# Patient Record
Sex: MALE | Race: OTHER | HISPANIC OR LATINO | URBAN - METROPOLITAN AREA
[De-identification: names, ages, dates, MRNs, and addresses within clinical notes are randomized per-mention and may not be internally consistent; named-entity substitution may affect disease eponyms.]

---

## 2018-07-31 ENCOUNTER — EMERGENCY (EMERGENCY)
Facility: HOSPITAL | Age: 43
LOS: 1 days | Discharge: HOME | End: 2018-07-31
Attending: EMERGENCY MEDICINE
Payer: SELF-PAY

## 2018-07-31 VITALS
DIASTOLIC BLOOD PRESSURE: 79 MMHG | HEART RATE: 98 BPM | SYSTOLIC BLOOD PRESSURE: 116 MMHG | OXYGEN SATURATION: 99 % | RESPIRATION RATE: 20 BRPM | TEMPERATURE: 99 F

## 2018-07-31 VITALS — HEIGHT: 60 IN | WEIGHT: 145.06 LBS

## 2018-07-31 PROCEDURE — 99283 EMERGENCY DEPT VISIT LOW MDM: CPT

## 2018-07-31 PROCEDURE — 99283 EMERGENCY DEPT VISIT LOW MDM: CPT | Mod: 25

## 2018-07-31 PROCEDURE — 73030 X-RAY EXAM OF SHOULDER: CPT

## 2018-07-31 PROCEDURE — 73030 X-RAY EXAM OF SHOULDER: CPT | Mod: 26,LT

## 2018-07-31 PROCEDURE — 99053 MED SERV 10PM-8AM 24 HR FAC: CPT

## 2018-07-31 RX ORDER — IBUPROFEN 200 MG
1 TABLET ORAL
Qty: 20 | Refills: 0 | OUTPATIENT
Start: 2018-07-31 | End: 2018-08-04

## 2018-07-31 RX ORDER — IBUPROFEN 200 MG
600 TABLET ORAL ONCE
Qty: 0 | Refills: 0 | Status: COMPLETED | OUTPATIENT
Start: 2018-07-31 | End: 2018-07-31

## 2018-07-31 RX ADMIN — Medication 600 MILLIGRAM(S): at 03:44

## 2018-07-31 NOTE — ED PROVIDER NOTE - UPPER EXTREMITY EXAM, LEFT
+ TTP of superior posterior shoulder, no obvious deformities, no ligamentous laxity, DROM 2nd to pain, elbow and wrist non tender/ FROM/LIMITED ROM/TENDERNESS

## 2018-07-31 NOTE — ED PROCEDURE NOTE - CPROC ED POST PROC CARE GUIDE1
Keep the cast/splint/dressing clean and dry./Verbal/written post procedure instructions were given to patient/caregiver./Instructed patient/caregiver regarding signs and symptoms of infection./Instructed patient/caregiver to follow-up with primary care physician./Elevate the injured extremity as instructed.

## 2018-07-31 NOTE — ED PROVIDER NOTE - MUSCULOSKELETAL MINIMAL EXAM
normal range of motion/no muscle tenderness/neck supple/motor intact/atraumatic/no midline vertebral tenderness

## 2018-07-31 NOTE — ED PROVIDER NOTE - PROGRESS NOTE DETAILS
Acute Ed read of Xray shows no acute fractures/ dislocation. PT aware if secondary reading of imaging changes they will be notified. Will cover abrasion with bacitracin, place shoulder in sling for comfort. referral to [facility] given to PT. PT verbalized understanding of diagnosis and importance of follow up at [facility]. PT educated on importance of follow up and when to return to the ED.

## 2018-07-31 NOTE — ED PROVIDER NOTE - SKIN, MLM
Skin normal color for race, warm, dry and intact. No evidence of rash. 2 cm superficial abrasion of posterior L shoulder

## 2018-07-31 NOTE — ED PROVIDER NOTE - OBJECTIVE STATEMENT
43 yo M Pt, no pertinent PmHx, presents to ED complaining of L shoulder pain s/p fall x 40 min. PT states he fell from standing height onto his L shoulder. Pt admits to associated L shoulder pain, LROM 2nd to pain. PT denies numbness, tingling, fever, chills, syncope, head trauma, LOC, and any other acute symptoms at this time.

## 2018-07-31 NOTE — ED PROVIDER NOTE - ATTENDING CONTRIBUTION TO CARE
43 yo M Pt, no pertinent PmHx, presents to ED complaining of L shoulder pain s/p fall x 40 min. PT states he fell from standing height onto his L shoulder. Pt admits to associated L shoulder pain, LROM 2nd to pain. PT denies numbness, tingling, fever, chills, syncope, head trauma, LOC, and any other acute symptoms at this time.  pe left shoulder  pain with rom; tender to palpation; sensation intact;  dx shoulder dislocation; xray , reduction and shoulder immobilizer

## 2018-08-06 ENCOUNTER — EMERGENCY (EMERGENCY)
Facility: HOSPITAL | Age: 43
LOS: 1 days | Discharge: HOME | End: 2018-08-06
Attending: EMERGENCY MEDICINE | Admitting: EMERGENCY MEDICINE
Payer: SELF-PAY

## 2018-08-06 VITALS
OXYGEN SATURATION: 97 % | TEMPERATURE: 98 F | HEART RATE: 84 BPM | RESPIRATION RATE: 18 BRPM | SYSTOLIC BLOOD PRESSURE: 141 MMHG | DIASTOLIC BLOOD PRESSURE: 97 MMHG

## 2018-08-06 VITALS — HEIGHT: 62 IN | WEIGHT: 164.91 LBS

## 2018-08-06 PROCEDURE — T1013: CPT

## 2018-08-06 PROCEDURE — 96372 THER/PROPH/DIAG INJ SC/IM: CPT

## 2018-08-06 PROCEDURE — 99283 EMERGENCY DEPT VISIT LOW MDM: CPT | Mod: 25

## 2018-08-06 PROCEDURE — 99283 EMERGENCY DEPT VISIT LOW MDM: CPT

## 2018-08-06 RX ORDER — KETOROLAC TROMETHAMINE 30 MG/ML
60 SYRINGE (ML) INJECTION ONCE
Qty: 0 | Refills: 0 | Status: DISCONTINUED | OUTPATIENT
Start: 2018-08-06 | End: 2018-08-06

## 2018-08-06 RX ORDER — IBUPROFEN 200 MG
1 TABLET ORAL
Qty: 20 | Refills: 0 | OUTPATIENT
Start: 2018-08-06 | End: 2018-08-10

## 2018-08-06 RX ADMIN — Medication 60 MILLIGRAM(S): at 17:47

## 2018-08-06 RX ADMIN — Medication 60 MILLIGRAM(S): at 17:10

## 2018-08-06 NOTE — ED ADULT TRIAGE NOTE - CHIEF COMPLAINT QUOTE
Pt axox3, Lithuanian speaking c/o  left shoulder pain. PT states he was seen and dc'd from [facility] x 1 week and told all test were negative

## 2018-08-06 NOTE — ED ADULT NURSE NOTE - CHIEF COMPLAINT QUOTE
Pt axox3, Haitian speaking c/o  left shoulder pain. PT states he was seen and dc'd from [facility] x 1 week and told all test were negative

## 2018-08-06 NOTE — ED PROVIDER NOTE - OBJECTIVE STATEMENT
Pt is a 42y M with no PMH complaining of L shoulder pain s/p fall last Tuesday. He states he was here the day of the injury and had an xray  which was normal. He still reports pain. He did not follow up with an orthopedist. HE is here because he wants another xray. I explained to pt that there is no need for another xray since there was no new trauma. He has been taking Ibuprofen. NKDA. No other complaints.
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
## 2018-08-06 NOTE — ED PROVIDER NOTE - CHPI ED SYMPTOMS NEG
no deformity/no tingling/no numbness/no difficulty bearing weight/no back pain/no bruising/no abrasion/no fever

## 2018-08-06 NOTE — ED PROVIDER NOTE - ATTENDING CONTRIBUTION TO CARE
I personally saw the patient with the PA, and completed the key components of the history and physical exam. I then discussed the management plan with the PA.    Pt here for evaluation of persistent L shoulder pain s/p recent fall.   Pt has not f/u with anyone. 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  No numbness/tingling/ neck pain/ other compalints.  Pt has good ROm of L shoulder with no gross deformities.  Moderate anterior yellowish bruising, but no point tenderness.  Some clicking noted when ranges his shoulder.  2+ radial pulse.  Pt advised to f/u with orthopedist and to take pain medication as needed. No indication for repeat imaging.

## 2018-08-06 NOTE — ED ADULT NURSE NOTE - NSIMPLEMENTINTERV_GEN_ALL_ED
Implemented All Universal Safety Interventions:  [place] to call system. Call bell, personal items and telephone within reach. Instruct patient to call for assistance. Room bathroom lighting operational. Non-slip footwear when patient is off stretcher. Physically safe environment: no spills, clutter or unnecessary equipment. Stretcher in lowest position, wheels locked, appropriate side rails in place.

## 2018-10-01 ENCOUNTER — EMERGENCY (EMERGENCY)
Facility: HOSPITAL | Age: 43
LOS: 1 days | Discharge: HOME | End: 2018-10-01
Attending: EMERGENCY MEDICINE
Payer: SELF-PAY

## 2018-10-01 VITALS
WEIGHT: 149.91 LBS | TEMPERATURE: 99 F | HEIGHT: 64 IN | RESPIRATION RATE: 18 BRPM | DIASTOLIC BLOOD PRESSURE: 80 MMHG | SYSTOLIC BLOOD PRESSURE: 140 MMHG | HEART RATE: 94 BPM | OXYGEN SATURATION: 99 %

## 2018-10-01 PROCEDURE — 99053 MED SERV 10PM-8AM 24 HR FAC: CPT

## 2018-10-01 PROCEDURE — 99284 EMERGENCY DEPT VISIT MOD MDM: CPT | Mod: 25
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
## 2018-10-01 NOTE — ED ADULT TRIAGE NOTE - CHIEF COMPLAINT QUOTE
Admits to drinking beer today. Alert and oriented X 3. No trauma. Undressed, placed in yellow gown, and belongings secured by SNA

## 2018-10-02 VITALS
SYSTOLIC BLOOD PRESSURE: 120 MMHG | DIASTOLIC BLOOD PRESSURE: 78 MMHG | RESPIRATION RATE: 18 BRPM | OXYGEN SATURATION: 98 % | HEART RATE: 80 BPM | TEMPERATURE: 98 F

## 2018-10-02 LAB
ETHANOL SERPL-MCNC: 145 MG/DL — SIGNIFICANT CHANGE UP
ETHANOL SERPL-MCNC: 361 MG/DL — SIGNIFICANT CHANGE UP

## 2018-10-02 PROCEDURE — 36415 COLL VENOUS BLD VENIPUNCTURE: CPT

## 2018-10-02 PROCEDURE — 99285 EMERGENCY DEPT VISIT HI MDM: CPT

## 2018-10-02 PROCEDURE — 80307 DRUG TEST PRSMV CHEM ANLYZR: CPT

## 2018-10-02 RX ORDER — IBUPROFEN 200 MG
600 TABLET ORAL ONCE
Qty: 0 | Refills: 0 | Status: COMPLETED | OUTPATIENT
Start: 2018-10-02 | End: 2018-10-02

## 2018-10-02 RX ADMIN — Medication 600 MILLIGRAM(S): at 09:50

## 2018-10-02 RX ADMIN — Medication 600 MILLIGRAM(S): at 08:50

## 2018-10-02 NOTE — ED ADULT NURSE REASSESSMENT NOTE - NS ED NURSE REASSESS COMMENT FT1
pt in no apparent distress, c/o 4/10 headache MD Thomas made aware, awaiting further orders, pt has no IV assess, plan of care explained to pt, pt verbalized understanding.

## 2018-10-02 NOTE — ED ADULT NURSE REASSESSMENT NOTE - NS ED NURSE REASSESS COMMENT FT1
Pt sleeping comfortably on stretcher, easy arousability, offers no complaints, pt safety maintained, both side rails raised, bed in low locked position.

## 2018-10-02 NOTE — ED ADULT NURSE REASSESSMENT NOTE - NS ED NURSE REASSESS COMMENT FT1
Pt returned from restroom, walking with steady gait, assisted back to stretcher, provided with a meal at this time as requested tolerating well, pt safety maintained.

## 2018-10-02 NOTE — ED ADULT NURSE NOTE - NSIMPLEMENTINTERV_GEN_ALL_ED
Implemented All Fall Risk Interventions:  [place] to call system. Call bell, personal items and telephone within reach. Instruct patient to call for assistance. Room bathroom lighting operational. Non-slip footwear when patient is off stretcher. Physically safe environment: no spills, clutter or unnecessary equipment. Stretcher in lowest position, wheels locked, appropriate side rails in place. Provide visual cue, wrist band, yellow gown, etc. Monitor gait and stability. Monitor for mental status changes and reorient to person, place, and time. Review medications for side effects contributing to fall risk. Reinforce activity limits and safety measures with patient and family.

## 2019-07-18 NOTE — ED ADULT NURSE NOTE - NS ED NURSE DC INFO COMPLEXITY
Did not want to review medical history kept say it is in the chart   Simple: Patient demonstrates quick and easy understanding

## 2020-03-15 ENCOUNTER — EMERGENCY (EMERGENCY)
Facility: HOSPITAL | Age: 45
LOS: 1 days | Discharge: HOME | End: 2020-03-15
Attending: STUDENT IN AN ORGANIZED HEALTH CARE EDUCATION/TRAINING PROGRAM
Payer: MEDICAID

## 2020-03-15 VITALS
SYSTOLIC BLOOD PRESSURE: 133 MMHG | RESPIRATION RATE: 20 BRPM | HEIGHT: 65 IN | OXYGEN SATURATION: 98 % | WEIGHT: 169.98 LBS | TEMPERATURE: 98 F | DIASTOLIC BLOOD PRESSURE: 89 MMHG | HEART RATE: 95 BPM

## 2020-03-15 PROCEDURE — 99284 EMERGENCY DEPT VISIT MOD MDM: CPT

## 2020-03-15 PROCEDURE — 71101 X-RAY EXAM UNILAT RIBS/CHEST: CPT | Mod: 26

## 2020-03-15 PROCEDURE — 93010 ELECTROCARDIOGRAM REPORT: CPT

## 2020-03-15 RX ORDER — KETOROLAC TROMETHAMINE 30 MG/ML
60 SYRINGE (ML) INJECTION ONCE
Refills: 0 | Status: DISCONTINUED | OUTPATIENT
Start: 2020-03-15 | End: 2020-03-15

## 2020-03-15 RX ADMIN — Medication 60 MILLIGRAM(S): at 16:05

## 2020-03-15 NOTE — ED PROVIDER NOTE - PATIENT PORTAL LINK FT
You can access the FollowMyHealth Patient Portal offered by [facility] by registering at the following website: [email or web address]. By joining [facility]’s FollowMyHealth portal, you will also be able to view your health information using other applications (apps) compatible with our system.

## 2020-03-15 NOTE — ED PROVIDER NOTE - CARE PLAN
Principal Discharge DX:	Contusion of thoracic wall, unspecified area of thoracic wall, initial encounter
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
## 2020-03-15 NOTE — ED PROVIDER NOTE - NS ED ROS FT
No fever/chills, No photophobia/eye pain/changes in vision, No ear pain/sore throat/dysphagia, + chest pain no palpitations, no SOB/cough/wheeze/stridor, No abdominal pain, No N/V/D, no dysuria/frequency/discharge, No neck pain +back pain, no rash, no changes in neurological status/function.

## 2020-03-15 NOTE — ED PROVIDER NOTE - NSFOLLOWUPINSTRUCTIONS_ED_ALL_ED_FT
rib contusion - rest, ice, motrin for pain - return to ED if you experience worsening symptoms such as difficulty breathing or worsening pain, otherwise follow up with your primary doctor within 2 days
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
## 2020-04-15 PROCEDURE — 93005 ELECTROCARDIOGRAM TRACING: CPT

## 2020-04-15 PROCEDURE — 71101 X-RAY EXAM UNILAT RIBS/CHEST: CPT

## 2020-04-15 PROCEDURE — 99283 EMERGENCY DEPT VISIT LOW MDM: CPT | Mod: 25

## 2020-04-15 PROCEDURE — 96372 THER/PROPH/DIAG INJ SC/IM: CPT

## 2020-11-13 NOTE — ED PROVIDER NOTE - GASTROINTESTINAL, MLM
Resolved issues regarding transfusion orders at [facility].   Abdomen soft, non-tender, no guarding.

## 2021-04-22 ENCOUNTER — INPATIENT (INPATIENT)
Facility: HOSPITAL | Age: 46
LOS: 1 days | Discharge: HOME | DRG: 563 | End: 2021-04-24
Attending: SURGERY | Admitting: SURGERY
Payer: MEDICAID

## 2021-04-22 VITALS
RESPIRATION RATE: 18 BRPM | SYSTOLIC BLOOD PRESSURE: 129 MMHG | DIASTOLIC BLOOD PRESSURE: 81 MMHG | HEIGHT: 65 IN | TEMPERATURE: 98 F | HEART RATE: 86 BPM | WEIGHT: 154.98 LBS | OXYGEN SATURATION: 100 %

## 2021-04-22 DIAGNOSIS — S42.101A FRACTURE OF UNSPECIFIED PART OF SCAPULA, RIGHT SHOULDER, INITIAL ENCOUNTER FOR CLOSED FRACTURE: ICD-10-CM

## 2021-04-22 LAB
ALBUMIN SERPL ELPH-MCNC: 4.7 G/DL — SIGNIFICANT CHANGE UP (ref 3.3–5.2)
ALP SERPL-CCNC: 176 U/L — HIGH (ref 40–120)
ALT FLD-CCNC: 66 U/L — HIGH
ANION GAP SERPL CALC-SCNC: 17 MMOL/L — SIGNIFICANT CHANGE UP (ref 5–17)
APTT BLD: 31.3 SEC — SIGNIFICANT CHANGE UP (ref 27.5–35.5)
AST SERPL-CCNC: 100 U/L — HIGH
BASOPHILS # BLD AUTO: 0.05 K/UL — SIGNIFICANT CHANGE UP (ref 0–0.2)
BASOPHILS NFR BLD AUTO: 0.4 % — SIGNIFICANT CHANGE UP (ref 0–2)
BILIRUB SERPL-MCNC: 0.5 MG/DL — SIGNIFICANT CHANGE UP (ref 0.4–2)
BLD GP AB SCN SERPL QL: SIGNIFICANT CHANGE UP
BUN SERPL-MCNC: 8 MG/DL — SIGNIFICANT CHANGE UP (ref 8–20)
CALCIUM SERPL-MCNC: 8.8 MG/DL — SIGNIFICANT CHANGE UP (ref 8.6–10.2)
CHLORIDE SERPL-SCNC: 96 MMOL/L — LOW (ref 98–107)
CK MB CFR SERPL CALC: 7 NG/ML — HIGH (ref 0–6.7)
CK SERPL-CCNC: 631 U/L — HIGH (ref 30–200)
CO2 SERPL-SCNC: 24 MMOL/L — SIGNIFICANT CHANGE UP (ref 22–29)
CREAT SERPL-MCNC: 0.48 MG/DL — LOW (ref 0.5–1.3)
EOSINOPHIL # BLD AUTO: 0 K/UL — SIGNIFICANT CHANGE UP (ref 0–0.5)
EOSINOPHIL NFR BLD AUTO: 0 % — SIGNIFICANT CHANGE UP (ref 0–6)
ETHANOL SERPL-MCNC: 178 MG/DL — HIGH (ref 0–9)
GLUCOSE SERPL-MCNC: 107 MG/DL — HIGH (ref 70–99)
HCT VFR BLD CALC: 40.1 % — SIGNIFICANT CHANGE UP (ref 39–50)
HGB BLD-MCNC: 13.9 G/DL — SIGNIFICANT CHANGE UP (ref 13–17)
IMM GRANULOCYTES NFR BLD AUTO: 0.3 % — SIGNIFICANT CHANGE UP (ref 0–1.5)
INR BLD: 1.1 RATIO — SIGNIFICANT CHANGE UP (ref 0.88–1.16)
LYMPHOCYTES # BLD AUTO: 0.94 K/UL — LOW (ref 1–3.3)
LYMPHOCYTES # BLD AUTO: 7.8 % — LOW (ref 13–44)
MCHC RBC-ENTMCNC: 34.5 PG — HIGH (ref 27–34)
MCHC RBC-ENTMCNC: 34.7 GM/DL — SIGNIFICANT CHANGE UP (ref 32–36)
MCV RBC AUTO: 99.5 FL — SIGNIFICANT CHANGE UP (ref 80–100)
MONOCYTES # BLD AUTO: 0.49 K/UL — SIGNIFICANT CHANGE UP (ref 0–0.9)
MONOCYTES NFR BLD AUTO: 4.1 % — SIGNIFICANT CHANGE UP (ref 2–14)
NEUTROPHILS # BLD AUTO: 10.5 K/UL — HIGH (ref 1.8–7.4)
NEUTROPHILS NFR BLD AUTO: 87.4 % — HIGH (ref 43–77)
PLATELET # BLD AUTO: 209 K/UL — SIGNIFICANT CHANGE UP (ref 150–400)
POTASSIUM SERPL-MCNC: 4 MMOL/L — SIGNIFICANT CHANGE UP (ref 3.5–5.3)
POTASSIUM SERPL-SCNC: 4 MMOL/L — SIGNIFICANT CHANGE UP (ref 3.5–5.3)
PROT SERPL-MCNC: 8.2 G/DL — SIGNIFICANT CHANGE UP (ref 6.6–8.7)
PROTHROM AB SERPL-ACNC: 12.7 SEC — SIGNIFICANT CHANGE UP (ref 10.6–13.6)
RAPID RVP RESULT: SIGNIFICANT CHANGE UP
RBC # BLD: 4.03 M/UL — LOW (ref 4.2–5.8)
RBC # FLD: 12 % — SIGNIFICANT CHANGE UP (ref 10.3–14.5)
SARS-COV-2 RNA SPEC QL NAA+PROBE: SIGNIFICANT CHANGE UP
SODIUM SERPL-SCNC: 137 MMOL/L — SIGNIFICANT CHANGE UP (ref 135–145)
T3 SERPL-MCNC: 85 NG/DL — SIGNIFICANT CHANGE UP (ref 80–200)
T4 AB SER-ACNC: 4.1 UG/DL — LOW (ref 4.5–12)
TROPONIN T SERPL-MCNC: <0.01 NG/ML — SIGNIFICANT CHANGE UP (ref 0–0.06)
TSH SERPL-MCNC: 0.14 UIU/ML — LOW (ref 0.27–4.2)
WBC # BLD: 12.02 K/UL — HIGH (ref 3.8–10.5)
WBC # FLD AUTO: 12.02 K/UL — HIGH (ref 3.8–10.5)

## 2021-04-22 PROCEDURE — 70450 CT HEAD/BRAIN W/O DYE: CPT | Mod: 26

## 2021-04-22 PROCEDURE — 72125 CT NECK SPINE W/O DYE: CPT | Mod: 26

## 2021-04-22 PROCEDURE — 99221 1ST HOSP IP/OBS SF/LOW 40: CPT | Mod: 57

## 2021-04-22 PROCEDURE — 99285 EMERGENCY DEPT VISIT HI MDM: CPT

## 2021-04-22 PROCEDURE — 70486 CT MAXILLOFACIAL W/O DYE: CPT | Mod: 26,MD

## 2021-04-22 PROCEDURE — 71046 X-RAY EXAM CHEST 2 VIEWS: CPT | Mod: 26

## 2021-04-22 PROCEDURE — 72170 X-RAY EXAM OF PELVIS: CPT | Mod: 26

## 2021-04-22 PROCEDURE — 73200 CT UPPER EXTREMITY W/O DYE: CPT | Mod: 26,RT,MG

## 2021-04-22 PROCEDURE — 71250 CT THORAX DX C-: CPT | Mod: 26,MD

## 2021-04-22 PROCEDURE — 99283 EMERGENCY DEPT VISIT LOW MDM: CPT

## 2021-04-22 PROCEDURE — 23570 CLTX SCAPULAR FX W/O MNPJ: CPT

## 2021-04-22 PROCEDURE — 93010 ELECTROCARDIOGRAM REPORT: CPT

## 2021-04-22 PROCEDURE — G1004: CPT

## 2021-04-22 PROCEDURE — 73010 X-RAY EXAM OF SHOULDER BLADE: CPT | Mod: 26

## 2021-04-22 PROCEDURE — 73030 X-RAY EXAM OF SHOULDER: CPT | Mod: 26,RT

## 2021-04-22 RX ORDER — GABAPENTIN 400 MG/1
300 CAPSULE ORAL THREE TIMES A DAY
Refills: 0 | Status: DISCONTINUED | OUTPATIENT
Start: 2021-04-22 | End: 2021-04-24

## 2021-04-22 RX ORDER — SENNA PLUS 8.6 MG/1
2 TABLET ORAL AT BEDTIME
Refills: 0 | Status: DISCONTINUED | OUTPATIENT
Start: 2021-04-22 | End: 2021-04-24

## 2021-04-22 RX ORDER — THIAMINE MONONITRATE (VIT B1) 100 MG
500 TABLET ORAL ONCE
Refills: 0 | Status: COMPLETED | OUTPATIENT
Start: 2021-04-22 | End: 2021-04-22

## 2021-04-22 RX ORDER — SODIUM CHLORIDE 9 MG/ML
1000 INJECTION, SOLUTION INTRAVENOUS ONCE
Refills: 0 | Status: COMPLETED | OUTPATIENT
Start: 2021-04-22 | End: 2021-04-22

## 2021-04-22 RX ORDER — ENOXAPARIN SODIUM 100 MG/ML
30 INJECTION SUBCUTANEOUS EVERY 12 HOURS
Refills: 0 | Status: DISCONTINUED | OUTPATIENT
Start: 2021-04-22 | End: 2021-04-24

## 2021-04-22 RX ORDER — ACETAMINOPHEN 500 MG
650 TABLET ORAL ONCE
Refills: 0 | Status: COMPLETED | OUTPATIENT
Start: 2021-04-22 | End: 2021-04-22

## 2021-04-22 RX ORDER — LIDOCAINE 4 G/100G
1 CREAM TOPICAL EVERY 24 HOURS
Refills: 0 | Status: DISCONTINUED | OUTPATIENT
Start: 2021-04-22 | End: 2021-04-24

## 2021-04-22 RX ORDER — ACETAMINOPHEN 500 MG
650 TABLET ORAL EVERY 6 HOURS
Refills: 0 | Status: DISCONTINUED | OUTPATIENT
Start: 2021-04-22 | End: 2021-04-24

## 2021-04-22 RX ORDER — KETOROLAC TROMETHAMINE 30 MG/ML
10 SYRINGE (ML) INJECTION EVERY 6 HOURS
Refills: 0 | Status: DISCONTINUED | OUTPATIENT
Start: 2021-04-22 | End: 2021-04-23

## 2021-04-22 RX ORDER — FOLIC ACID 0.8 MG
1 TABLET ORAL DAILY
Refills: 0 | Status: DISCONTINUED | OUTPATIENT
Start: 2021-04-22 | End: 2021-04-24

## 2021-04-22 RX ORDER — TETANUS TOXOID, REDUCED DIPHTHERIA TOXOID AND ACELLULAR PERTUSSIS VACCINE, ADSORBED 5; 2.5; 8; 8; 2.5 [IU]/.5ML; [IU]/.5ML; UG/.5ML; UG/.5ML; UG/.5ML
0.5 SUSPENSION INTRAMUSCULAR ONCE
Refills: 0 | Status: COMPLETED | OUTPATIENT
Start: 2021-04-22 | End: 2021-04-22

## 2021-04-22 RX ORDER — MORPHINE SULFATE 50 MG/1
4 CAPSULE, EXTENDED RELEASE ORAL ONCE
Refills: 0 | Status: DISCONTINUED | OUTPATIENT
Start: 2021-04-22 | End: 2021-04-22

## 2021-04-22 RX ADMIN — SENNA PLUS 2 TABLET(S): 8.6 TABLET ORAL at 21:25

## 2021-04-22 RX ADMIN — Medication 10 MILLIGRAM(S): at 23:07

## 2021-04-22 RX ADMIN — LIDOCAINE 1 PATCH: 4 CREAM TOPICAL at 21:22

## 2021-04-22 RX ADMIN — Medication 1 MILLIGRAM(S): at 21:21

## 2021-04-22 RX ADMIN — GABAPENTIN 300 MILLIGRAM(S): 400 CAPSULE ORAL at 21:22

## 2021-04-22 RX ADMIN — MORPHINE SULFATE 4 MILLIGRAM(S): 50 CAPSULE, EXTENDED RELEASE ORAL at 18:00

## 2021-04-22 RX ADMIN — TETANUS TOXOID, REDUCED DIPHTHERIA TOXOID AND ACELLULAR PERTUSSIS VACCINE, ADSORBED 0.5 MILLILITER(S): 5; 2.5; 8; 8; 2.5 SUSPENSION INTRAMUSCULAR at 18:03

## 2021-04-22 RX ADMIN — Medication 105 MILLIGRAM(S): at 21:21

## 2021-04-22 RX ADMIN — ENOXAPARIN SODIUM 30 MILLIGRAM(S): 100 INJECTION SUBCUTANEOUS at 21:26

## 2021-04-22 RX ADMIN — SODIUM CHLORIDE 1000 MILLILITER(S): 9 INJECTION, SOLUTION INTRAVENOUS at 19:03

## 2021-04-22 RX ADMIN — Medication 650 MILLIGRAM(S): at 13:24

## 2021-04-22 RX ADMIN — Medication 650 MILLIGRAM(S): at 14:04

## 2021-04-22 RX ADMIN — Medication 650 MILLIGRAM(S): at 21:21

## 2021-04-22 RX ADMIN — Medication 650 MILLIGRAM(S): at 22:22
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
## 2021-04-22 NOTE — ED PROCEDURE NOTE - PROCEDURE ADDITIONAL DETAILS
instructed pt to follow up with orthopaedic surgery in one to two weeks with Dr. Oliveira as per ortho service instructions
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
## 2021-04-22 NOTE — ED PROVIDER NOTE - CARE PLAN
Principal Discharge DX:	Closed fracture of right scapula, unspecified part of scapula, initial encounter  Secondary Diagnosis:	Pneumothorax, unspecified type
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
## 2021-04-22 NOTE — ED PROVIDER NOTE - NS ED ROS FT
Constitutional: no fever, no sweats, and no chills.  Eyes: no pain, no redness, and no visual changes.  ENMT: no ear pain and no hearing problems, no nasal congestion/drainage, no dysphagia, and no throat pain, no neck pain, no stiffness  CV: no chest pain, no edema.  Resp: no cough, no dyspnea  GI: no abdominal pain, no bloating, no constipation, no diarrhea, no nausea and no vomiting.  : no dysuria, no hematuria  MSK: +msk pain, no weakness  Skin: no lesions, and no rashes.  Neuro: no LOC, no headache, no sensory deficits, and no weakness.

## 2021-04-22 NOTE — CONSULT NOTE ADULT - ATTENDING COMMENTS
Orthopaedic Trauma Surgeon Addendum:    I have personally performed a face-to-face diagnostic evaluation on this patient.  I have reviewed the physician assistant note and agree with the history, exam, and plan of care, except as noted.    Fracture reviewed on CT. Does not involve articular surface.  Sling for comfort, ROM as tolerated. likely f/u prn due to health issues and COVID 19 pandemic    David Fishman MD  Orthopaedic Trauma Surgeon  [facility] [place]

## 2021-04-22 NOTE — ED ADULT NURSE REASSESSMENT NOTE - NS ED NURSE REASSESS COMMENT FT1
received pt from previous nurse. Pt is resting in bed comfortably at this time, no apparent distress noted at this time. pt safety maintained. Pt denies any complaints at this time. pt educated on plan of care, plan of care taught back to RN. plan of care education deemed proficient through successful teach back. will continue to reeducate pt regarding plan of care.

## 2021-04-22 NOTE — ED PROVIDER NOTE - PHYSICAL EXAMINATION
General: well appearing, NAD  Head:  NC, AT  Eyes: EOMI, PERRLA, no scleral icterus  Ears: no erythema/drainage  Nose: midline, no bleeding/drainage  Throat: MMM  Cardiac: RRR, no m/r/g, no lower extremity edema  Respiratory: CTABL, no wheezes/rales/rhonchi, equal chest wall expansions, no use of accessory muscles, no retractions  Abdomen: soft, nondistended, nontender, no rebound tenderness, no guarding, nonperitonitic  MSK/Vascular: full ROM, soft compartments, warm extremities, significant R. back tenderness overlying the scapula, no deltoid tenderness bilaterally  Neuro: Alert and oriented x3, motor/sensory intact  Skin: multiple small 1 to 2 mm skin abrasions over upper extremities  Psych: calm, cooperative, normal affect

## 2021-04-23 DIAGNOSIS — S42.101A FRACTURE OF UNSPECIFIED PART OF SCAPULA, RIGHT SHOULDER, INITIAL ENCOUNTER FOR CLOSED FRACTURE: ICD-10-CM

## 2021-04-23 DIAGNOSIS — J93.9 PNEUMOTHORAX, UNSPECIFIED: ICD-10-CM

## 2021-04-23 DIAGNOSIS — F10.20 ALCOHOL DEPENDENCE, UNCOMPLICATED: ICD-10-CM

## 2021-04-23 DIAGNOSIS — S22.39XA FRACTURE OF ONE RIB, UNSPECIFIED SIDE, INITIAL ENCOUNTER FOR CLOSED FRACTURE: ICD-10-CM

## 2021-04-23 LAB
ANION GAP SERPL CALC-SCNC: 13 MMOL/L — SIGNIFICANT CHANGE UP (ref 5–17)
BASOPHILS # BLD AUTO: 0.04 K/UL — SIGNIFICANT CHANGE UP (ref 0–0.2)
BASOPHILS NFR BLD AUTO: 0.4 % — SIGNIFICANT CHANGE UP (ref 0–2)
BUN SERPL-MCNC: 9 MG/DL — SIGNIFICANT CHANGE UP (ref 8–20)
CALCIUM SERPL-MCNC: 8.9 MG/DL — SIGNIFICANT CHANGE UP (ref 8.6–10.2)
CHLORIDE SERPL-SCNC: 95 MMOL/L — LOW (ref 98–107)
CO2 SERPL-SCNC: 28 MMOL/L — SIGNIFICANT CHANGE UP (ref 22–29)
COVID-19 SPIKE DOMAIN AB INTERP: NEGATIVE — SIGNIFICANT CHANGE UP
COVID-19 SPIKE DOMAIN ANTIBODY RESULT: 0.4 U/ML — SIGNIFICANT CHANGE UP
CREAT SERPL-MCNC: 0.56 MG/DL — SIGNIFICANT CHANGE UP (ref 0.5–1.3)
EOSINOPHIL # BLD AUTO: 0.01 K/UL — SIGNIFICANT CHANGE UP (ref 0–0.5)
EOSINOPHIL NFR BLD AUTO: 0.1 % — SIGNIFICANT CHANGE UP (ref 0–6)
GLUCOSE SERPL-MCNC: 118 MG/DL — HIGH (ref 70–99)
HCT VFR BLD CALC: 36.6 % — LOW (ref 39–50)
HGB BLD-MCNC: 12.9 G/DL — LOW (ref 13–17)
IMM GRANULOCYTES NFR BLD AUTO: 0.3 % — SIGNIFICANT CHANGE UP (ref 0–1.5)
LYMPHOCYTES # BLD AUTO: 1.27 K/UL — SIGNIFICANT CHANGE UP (ref 1–3.3)
LYMPHOCYTES # BLD AUTO: 13.5 % — SIGNIFICANT CHANGE UP (ref 13–44)
MAGNESIUM SERPL-MCNC: 2 MG/DL — SIGNIFICANT CHANGE UP (ref 1.6–2.6)
MCHC RBC-ENTMCNC: 34.5 PG — HIGH (ref 27–34)
MCHC RBC-ENTMCNC: 35.2 GM/DL — SIGNIFICANT CHANGE UP (ref 32–36)
MCV RBC AUTO: 97.9 FL — SIGNIFICANT CHANGE UP (ref 80–100)
MONOCYTES # BLD AUTO: 0.86 K/UL — SIGNIFICANT CHANGE UP (ref 0–0.9)
MONOCYTES NFR BLD AUTO: 9.1 % — SIGNIFICANT CHANGE UP (ref 2–14)
NEUTROPHILS # BLD AUTO: 7.2 K/UL — SIGNIFICANT CHANGE UP (ref 1.8–7.4)
NEUTROPHILS NFR BLD AUTO: 76.6 % — SIGNIFICANT CHANGE UP (ref 43–77)
PHOSPHATE SERPL-MCNC: 3.5 MG/DL — SIGNIFICANT CHANGE UP (ref 2.4–4.7)
PLATELET # BLD AUTO: 183 K/UL — SIGNIFICANT CHANGE UP (ref 150–400)
POTASSIUM SERPL-MCNC: 3.6 MMOL/L — SIGNIFICANT CHANGE UP (ref 3.5–5.3)
POTASSIUM SERPL-SCNC: 3.6 MMOL/L — SIGNIFICANT CHANGE UP (ref 3.5–5.3)
RBC # BLD: 3.74 M/UL — LOW (ref 4.2–5.8)
RBC # FLD: 11.7 % — SIGNIFICANT CHANGE UP (ref 10.3–14.5)
SARS-COV-2 IGG+IGM SERPL QL IA: 0.4 U/ML — SIGNIFICANT CHANGE UP
SARS-COV-2 IGG+IGM SERPL QL IA: NEGATIVE — SIGNIFICANT CHANGE UP
SODIUM SERPL-SCNC: 136 MMOL/L — SIGNIFICANT CHANGE UP (ref 135–145)
WBC # BLD: 9.41 K/UL — SIGNIFICANT CHANGE UP (ref 3.8–10.5)
WBC # FLD AUTO: 9.41 K/UL — SIGNIFICANT CHANGE UP (ref 3.8–10.5)

## 2021-04-23 PROCEDURE — 72170 X-RAY EXAM OF PELVIS: CPT | Mod: 26

## 2021-04-23 PROCEDURE — 71045 X-RAY EXAM CHEST 1 VIEW: CPT | Mod: 26

## 2021-04-23 RX ORDER — IBUPROFEN 200 MG
600 TABLET ORAL EVERY 6 HOURS
Refills: 0 | Status: DISCONTINUED | OUTPATIENT
Start: 2021-04-23 | End: 2021-04-24

## 2021-04-23 RX ORDER — POTASSIUM CHLORIDE 20 MEQ
40 PACKET (EA) ORAL ONCE
Refills: 0 | Status: COMPLETED | OUTPATIENT
Start: 2021-04-23 | End: 2021-04-23

## 2021-04-23 RX ORDER — THIAMINE MONONITRATE (VIT B1) 100 MG
100 TABLET ORAL DAILY
Refills: 0 | Status: DISCONTINUED | OUTPATIENT
Start: 2021-04-23 | End: 2021-04-24

## 2021-04-23 RX ORDER — BACITRACIN ZINC 500 UNIT/G
1 OINTMENT IN PACKET (EA) TOPICAL
Refills: 0 | Status: DISCONTINUED | OUTPATIENT
Start: 2021-04-23 | End: 2021-04-24

## 2021-04-23 RX ADMIN — ENOXAPARIN SODIUM 30 MILLIGRAM(S): 100 INJECTION SUBCUTANEOUS at 09:02

## 2021-04-23 RX ADMIN — LIDOCAINE 1 PATCH: 4 CREAM TOPICAL at 09:04

## 2021-04-23 RX ADMIN — Medication 650 MILLIGRAM(S): at 05:32

## 2021-04-23 RX ADMIN — Medication 10 MILLIGRAM(S): at 05:31

## 2021-04-23 RX ADMIN — Medication 100 MILLIGRAM(S): at 09:02

## 2021-04-23 RX ADMIN — Medication 10 MILLIGRAM(S): at 00:07

## 2021-04-23 RX ADMIN — Medication 650 MILLIGRAM(S): at 12:07

## 2021-04-23 RX ADMIN — Medication 650 MILLIGRAM(S): at 13:00

## 2021-04-23 RX ADMIN — Medication 10 MILLIGRAM(S): at 06:38

## 2021-04-23 RX ADMIN — GABAPENTIN 300 MILLIGRAM(S): 400 CAPSULE ORAL at 21:06

## 2021-04-23 RX ADMIN — Medication 600 MILLIGRAM(S): at 15:11

## 2021-04-23 RX ADMIN — Medication 650 MILLIGRAM(S): at 18:51

## 2021-04-23 RX ADMIN — ENOXAPARIN SODIUM 30 MILLIGRAM(S): 100 INJECTION SUBCUTANEOUS at 21:06

## 2021-04-23 RX ADMIN — Medication 600 MILLIGRAM(S): at 17:42

## 2021-04-23 RX ADMIN — Medication 40 MILLIEQUIVALENT(S): at 09:02

## 2021-04-23 RX ADMIN — Medication 650 MILLIGRAM(S): at 17:41

## 2021-04-23 RX ADMIN — GABAPENTIN 300 MILLIGRAM(S): 400 CAPSULE ORAL at 05:30

## 2021-04-23 RX ADMIN — Medication 650 MILLIGRAM(S): at 06:35

## 2021-04-23 RX ADMIN — LIDOCAINE 1 PATCH: 4 CREAM TOPICAL at 20:29

## 2021-04-23 RX ADMIN — Medication 1 MILLIGRAM(S): at 09:02

## 2021-04-23 RX ADMIN — GABAPENTIN 300 MILLIGRAM(S): 400 CAPSULE ORAL at 15:11

## 2021-04-23 RX ADMIN — Medication 1 APPLICATION(S): at 18:54

## 2021-04-23 RX ADMIN — LIDOCAINE 1 PATCH: 4 CREAM TOPICAL at 09:03

## 2021-04-23 NOTE — DISCHARGE NOTE PROVIDER - NSFOLLOWUPCLINICS_GEN_ALL_ED_FT
[facility] Acute Care Surgery  Acute Care Surgery  [address] [place] [id number]  Phone: [phone]  Fax:

## 2021-04-23 NOTE — DISCHARGE NOTE PROVIDER - NSDCCPCAREPLAN_GEN_ALL_CORE_FT
PRINCIPAL DISCHARGE DIAGNOSIS  Diagnosis: Fracture of one rib, closed  Assessment and Plan of Treatment: Follow up: Please call and make an appointment with the Acute Care Surgery Clinic 10-14 days after discharge. Also, please call and make an appointment with your primary care physician as per your usual schedule.   Activity: It is recommended that you DO NOT go on an airplane or do anything that involves flying at high altitudes. DO NOT smoke cigarettes, participate in high impact activities, heavy lifting, or go scuba diving at this time - doing so would increase your risk of getting another pneumothroax.   Diet: May continue regular diet.  Medications: Please take all home medications as previously prescribed. Tylenol for pain relief, as needed.  Patient is advised to RETURN TO THE EMERGENCY DEPARTMENT for any of the following - worsening pain, fever/chills, nausea/vomiting, altered mental status, chest pain, shortness of breath, or any other new / worsening symptom.      SECONDARY DISCHARGE DIAGNOSES  Diagnosis: Pneumothorax, unspecified type  Assessment and Plan of Treatment: See above.    Diagnosis: Closed fracture of right scapula, unspecified part of scapula, initial encounter  Assessment and Plan of Treatment: Please remain non-weight bearing to right upper extremity with sling and follow-up with orthopedic surgeon Dr. Fishman after discharge for further management.     PRINCIPAL DISCHARGE DIAGNOSIS  Diagnosis: Fracture of one rib, closed  Assessment and Plan of Treatment: Follow up: Please call and make an appointment with the Acute Care Surgery Clinic 10-14 days after discharge. Also, please call and make an appointment with your primary care physician as per your usual schedule.   Activity: It is recommended that you DO NOT go on an airplane or do anything that involves flying at high altitudes. DO NOT smoke cigarettes, participate in high impact activities, heavy lifting, or go scuba diving at this time - doing so would increase your risk of getting another pneumothroax.   Diet: May continue regular diet.  Medications: Please take all home medications as previously prescribed. Tylenol and/or ibuprofen for pain relief, as needed. You can also purchase over-the-counter pain patches such as Salon Pas or Icy Hot with Lidocaine to assist with pain relief.  Patient is advised to RETURN TO THE EMERGENCY DEPARTMENT for any of the following - worsening pain, fever/chills, nausea/vomiting, altered mental status, chest pain, shortness of breath, or any other new / worsening symptom.      SECONDARY DISCHARGE DIAGNOSES  Diagnosis: Pneumothorax, unspecified type  Assessment and Plan of Treatment: See above.    Diagnosis: Closed fracture of right scapula, unspecified part of scapula, initial encounter  Assessment and Plan of Treatment: Please remain non-weight bearing to right upper extremity with sling and follow-up with orthopedic surgeon Dr. Fishman after discharge for further management.     PRINCIPAL DISCHARGE DIAGNOSIS  Diagnosis: Fracture of one rib, closed  Assessment and Plan of Treatment: Follow up: Please call and make an appointment with the Acute Care Surgery Clinic 10-14 days after discharge. Also, please call and make an appointment with your primary care physician as per your usual schedule.   Activity: It is recommended that you DO NOT go on an airplane or do anything that involves flying at high altitudes. DO NOT smoke cigarettes, participate in high impact activities, heavy lifting, or go scuba diving at this time - doing so would increase your risk of getting another pneumothroax.   Diet: May continue regular diet.  Medications: Please take all home medications as previously prescribed. Tylenol and/or ibuprofen for pain relief, as needed. You can also purchase over-the-counter pain patches such as Salon Pas or Icy Hot with Lidocaine to assist with pain relief.  Wound Care: Apply bacitracin to facial abrasions daily.   Patient is advised to RETURN TO THE EMERGENCY DEPARTMENT for any of the following - worsening pain, fever/chills, nausea/vomiting, altered mental status, chest pain, shortness of breath, or any other new / worsening symptom.      SECONDARY DISCHARGE DIAGNOSES  Diagnosis: Pneumothorax, unspecified type  Assessment and Plan of Treatment: See above.    Diagnosis: Closed fracture of right scapula, unspecified part of scapula, initial encounter  Assessment and Plan of Treatment: Please remain non-weight bearing to right upper extremity with sling and follow-up with orthopedic surgeon Dr. Fishman after discharge for further management.     PRINCIPAL DISCHARGE DIAGNOSIS  Diagnosis: Fracture of one rib, closed  Assessment and Plan of Treatment: Follow up: Please call and make an appointment with the Acute Care Surgery Clinic 10-14 days after discharge. Also, please call and make an appointment with your primary care physician as per your usual schedule.   Activity: It is recommended that you DO NOT go on an airplane or do anything that involves flying at high altitudes. DO NOT smoke cigarettes, participate in high impact activities, heavy lifting, or go scuba diving at this time - doing so would increase your risk of getting another pneumothroax.   Diet: May continue regular diet.  Medications: Please take all home medications as previously prescribed. Tylenol and/or ibuprofen for pain relief, as needed. You can also purchase over-the-counter pain patches such as Salon Pas or Icy Hot with Lidocaine to assist with pain relief.  Wound Care: Apply bacitracin to facial abrasions daily.   Patient is advised to RETURN TO THE EMERGENCY DEPARTMENT for any of the following - worsening pain, fever/chills, nausea/vomiting, altered mental status, chest pain, shortness of breath, or any other new / worsening symptom.      SECONDARY DISCHARGE DIAGNOSES  Diagnosis: Pneumothorax, unspecified type  Assessment and Plan of Treatment: See above.    Diagnosis: Closed fracture of right scapula, unspecified part of scapula, initial encounter  Assessment and Plan of Treatment: Please remain NON-WEIGHT BEARING TO RIGHT UPPER EXTREMITEY with sling and follow-up with orthopedic surgeon Dr. Fishman after discharge for further management. Continue outpatient occupational therapy, as well.

## 2021-04-23 NOTE — DISCHARGE NOTE PROVIDER - CARE PROVIDER_API CALL
David Fishman)  Orthopaedic Surgery  [address] [place] [address]  Phone: [phone]  Fax: [phone]  Follow Up Time:

## 2021-04-23 NOTE — OCCUPATIONAL THERAPY INITIAL EVALUATION ADULT - RANGE OF MOTION EXAMINATION, UPPER EXTREMITY
right grasp, digits, elbow WNL, shoulder limited by pain, moves approx 10* AROM, as per MD orders, ROM as tolerated,/Left UE Active ROM was WNL (within normal limits)

## 2021-04-23 NOTE — SBIRT NOTE ADULT - NSSBIRTALCPASSREFTXDET_GEN_A_CORE
Provided SBIRT services: Referral to Treatment Performed. Screening results reviewed with patient and patient provided information regarding healthy guidelines and potential negative consequences associated with level of risk. Motivation and readiness to reduce or stop use was discussed and goals and activities to make changes were suggested/offered. Referral for complete assessment and level of care determination at a certified treatment facility was completed by giving the patient information for treatment facilities that met their needs and encouraging them to call for an appointment. A call was not made to a facility because pt would like to cut down on his own. Czech-speaking AA meeting information provided.  Offered information, patient signed consent for: [employer], case management service.

## 2021-04-23 NOTE — OCCUPATIONAL THERAPY INITIAL EVALUATION ADULT - EATING, ASSISTIVE DEVICE, OT EVAL
educated on compensatory techniques and use right hand as assist to hold/manage utensils and packages, pt in good understanding, increased time and effort, observed pt eating lunch

## 2021-04-23 NOTE — OCCUPATIONAL THERAPY INITIAL EVALUATION ADULT - PERSONAL SAFETY AND JUDGMENT, REHAB EVAL
carryover noted of nwb right UE with tranfers and bed mobility, consistently following directions/intact

## 2021-04-23 NOTE — OCCUPATIONAL THERAPY INITIAL EVALUATION ADULT - UPPER BODY DRESSING, ASSISTIVE DEVICE, OT EVAL
educated on compensatory dressing techniques, with good understanding, educated on don/doff of sling

## 2021-04-23 NOTE — DISCHARGE NOTE PROVIDER - NSDCMRMEDTOKEN_GEN_ALL_CORE_FT
Aleve:    mg oral tablet: 1 tab(s) orally every 6 hours   acetaminophen 325 mg oral tablet: 2 tab(s) orally every 6 hours  bacitracin 500 units/g topical ointment: 1 application topically 2 times a day   mg oral tablet: 1 tab(s) orally every 6 hours  occupational therapy:
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
## 2021-04-24 VITALS
RESPIRATION RATE: 18 BRPM | SYSTOLIC BLOOD PRESSURE: 129 MMHG | TEMPERATURE: 98 F | DIASTOLIC BLOOD PRESSURE: 76 MMHG | OXYGEN SATURATION: 98 % | HEART RATE: 76 BPM

## 2021-04-24 PROCEDURE — 36415 COLL VENOUS BLD VENIPUNCTURE: CPT

## 2021-04-24 PROCEDURE — 80307 DRUG TEST PRSMV CHEM ANLYZR: CPT

## 2021-04-24 PROCEDURE — 72125 CT NECK SPINE W/O DYE: CPT

## 2021-04-24 PROCEDURE — 99231 SBSQ HOSP IP/OBS SF/LOW 25: CPT

## 2021-04-24 PROCEDURE — 85730 THROMBOPLASTIN TIME PARTIAL: CPT

## 2021-04-24 PROCEDURE — 84436 ASSAY OF TOTAL THYROXINE: CPT

## 2021-04-24 PROCEDURE — 86769 SARS-COV-2 COVID-19 ANTIBODY: CPT

## 2021-04-24 PROCEDURE — 73010 X-RAY EXAM OF SHOULDER BLADE: CPT

## 2021-04-24 PROCEDURE — 70450 CT HEAD/BRAIN W/O DYE: CPT

## 2021-04-24 PROCEDURE — 86850 RBC ANTIBODY SCREEN: CPT

## 2021-04-24 PROCEDURE — 86900 BLOOD TYPING SEROLOGIC ABO: CPT

## 2021-04-24 PROCEDURE — 85025 COMPLETE CBC W/AUTO DIFF WBC: CPT

## 2021-04-24 PROCEDURE — 84443 ASSAY THYROID STIM HORMONE: CPT

## 2021-04-24 PROCEDURE — 71250 CT THORAX DX C-: CPT

## 2021-04-24 PROCEDURE — 84480 ASSAY TRIIODOTHYRONINE (T3): CPT

## 2021-04-24 PROCEDURE — 84484 ASSAY OF TROPONIN QUANT: CPT

## 2021-04-24 PROCEDURE — 71045 X-RAY EXAM CHEST 1 VIEW: CPT

## 2021-04-24 PROCEDURE — 0225U NFCT DS DNA&RNA 21 SARSCOV2: CPT

## 2021-04-24 PROCEDURE — 73200 CT UPPER EXTREMITY W/O DYE: CPT

## 2021-04-24 PROCEDURE — 96374 THER/PROPH/DIAG INJ IV PUSH: CPT

## 2021-04-24 PROCEDURE — 86901 BLOOD TYPING SEROLOGIC RH(D): CPT

## 2021-04-24 PROCEDURE — 83735 ASSAY OF MAGNESIUM: CPT

## 2021-04-24 PROCEDURE — 84100 ASSAY OF PHOSPHORUS: CPT

## 2021-04-24 PROCEDURE — 80053 COMPREHEN METABOLIC PANEL: CPT

## 2021-04-24 PROCEDURE — 71046 X-RAY EXAM CHEST 2 VIEWS: CPT

## 2021-04-24 PROCEDURE — 71045 X-RAY EXAM CHEST 1 VIEW: CPT | Mod: 26

## 2021-04-24 PROCEDURE — 99239 HOSP IP/OBS DSCHRG MGMT >30: CPT

## 2021-04-24 PROCEDURE — 70486 CT MAXILLOFACIAL W/O DYE: CPT

## 2021-04-24 PROCEDURE — 97166 OT EVAL MOD COMPLEX 45 MIN: CPT

## 2021-04-24 PROCEDURE — 72170 X-RAY EXAM OF PELVIS: CPT

## 2021-04-24 PROCEDURE — 96375 TX/PRO/DX INJ NEW DRUG ADDON: CPT

## 2021-04-24 PROCEDURE — 82550 ASSAY OF CK (CPK): CPT

## 2021-04-24 PROCEDURE — 90715 TDAP VACCINE 7 YRS/> IM: CPT

## 2021-04-24 PROCEDURE — 93005 ELECTROCARDIOGRAM TRACING: CPT

## 2021-04-24 PROCEDURE — 99285 EMERGENCY DEPT VISIT HI MDM: CPT | Mod: 25

## 2021-04-24 PROCEDURE — 73030 X-RAY EXAM OF SHOULDER: CPT

## 2021-04-24 PROCEDURE — 80048 BASIC METABOLIC PNL TOTAL CA: CPT

## 2021-04-24 PROCEDURE — 82553 CREATINE MB FRACTION: CPT

## 2021-04-24 PROCEDURE — 85610 PROTHROMBIN TIME: CPT

## 2021-04-24 RX ORDER — ACETAMINOPHEN 500 MG
2 TABLET ORAL
Qty: 0 | Refills: 0 | DISCHARGE
Start: 2021-04-24

## 2021-04-24 RX ORDER — BACITRACIN ZINC 500 UNIT/G
1 OINTMENT IN PACKET (EA) TOPICAL
Qty: 0 | Refills: 0 | DISCHARGE
Start: 2021-04-24

## 2021-04-24 RX ADMIN — Medication 650 MILLIGRAM(S): at 06:40

## 2021-04-24 RX ADMIN — Medication 600 MILLIGRAM(S): at 06:41

## 2021-04-24 RX ADMIN — GABAPENTIN 300 MILLIGRAM(S): 400 CAPSULE ORAL at 05:55

## 2021-04-24 RX ADMIN — LIDOCAINE 1 PATCH: 4 CREAM TOPICAL at 08:00

## 2021-04-24 RX ADMIN — Medication 650 MILLIGRAM(S): at 00:40

## 2021-04-24 RX ADMIN — Medication 1 MILLIGRAM(S): at 10:39

## 2021-04-24 RX ADMIN — Medication 100 MILLIGRAM(S): at 10:39

## 2021-04-24 RX ADMIN — ENOXAPARIN SODIUM 30 MILLIGRAM(S): 100 INJECTION SUBCUTANEOUS at 10:39

## 2021-04-24 RX ADMIN — Medication 650 MILLIGRAM(S): at 11:00

## 2021-04-24 RX ADMIN — Medication 1 APPLICATION(S): at 05:58

## 2021-04-24 RX ADMIN — Medication 650 MILLIGRAM(S): at 01:40

## 2021-04-24 RX ADMIN — Medication 650 MILLIGRAM(S): at 05:55

## 2021-04-24 RX ADMIN — Medication 600 MILLIGRAM(S): at 05:55

## 2021-04-24 RX ADMIN — Medication 650 MILLIGRAM(S): at 10:39
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
## 2021-04-24 NOTE — PROGRESS NOTE ADULT - ATTENDING COMMENTS
The patient was seen and examined  Events noted  No new problems  No dyspnea  CXR reviewed  Okay for discharge  Follow up with orthopedics
Patient was seen in the ER on 4/23/2021 during morning rounds around 9 am.  Patient resented with Rt 6th rib Fx and Rt scapular Fx on 4/22/2021 around 1 pm.  CXR on 4/22/2021 1:06 pm did not show any significant pneumothorax but the CT around 4:27 pm showed a small occult pneumothorax on the Rt side anterior base.  Patient remained stable over night. CXR was done on 4/23/2021 6:23 am. It was reviewed and showed small apical pneumothorax discernable ON plain film.   Plan would be to cancel discharge and admit patient for monitoring and repeat CXR in am or earlier if clinically indicated.

## 2021-06-09 NOTE — ED ADULT NURSE NOTE - EXTENSIONS OF SELF_ADULT
[facility] Inpatient Rehab Individualized Plan of Care Weekly Progress Update (Team Conference)      Expected Discharge Date: 6/16/2021  Expected Discharge Time:        06/09/21 1001   Diagnosis   Primary Rehabilitation Diagnosis Critical Illness Myopathy; post COVID   Interdisciplinary Assessment   Facilitating Factors Supportive care partner(s);Previous level of activity in home/community;Self-advocacy   Social Support   Social Support Sister, children   Primary Person to Coordinate Post-Discharge Plans Patient;Care partner (comment)  (Dtr Digna )   Transportation Resources Family, dtr, friends as well   Barriers to Community Discharge intermittent supervision, TBD   Medical   Active Medical Problems See MD progress note;See problem list   Medication Management   Medication Management Will require assist at discharge   Person to Assist Patient with Medication Management Family to check in    Skin Integrity   Skin Integrity Skin intact   Pain   Pain No   Bladder   Bladder Not applicable   Devices Required Catheter   Level of Assistance Total assist   Bowel   Bowel Always continent   Devices Required Up to toilet   Swallow and/or Eating   Diet General;Diabetic   Liquids Thin   Level of Assistance to Feed Self Independent - no assist   D/C Goal Independent   Progress  Towards Plan of Care Discharge Goals Maintaining   Communication   Comprehension Supervision   Expression Independent   Communication D/C Goal Independent   Progress Towards Plan of Care Discharge Goals Progressing   Cognition and/or Safety   Attention Supervision   Memory Supervision   Problem Solving Supervision   Social Interaction Modified independent   Safety Awareness Supervision   Patient Safety Measures Needed Bed alarm;Chair alarm   Cognition and/or Safety D/C Goal Modified independent   Progress Towards Plan of Care Discharge Goals Progressing   Mobility   Bed Mobility Current Independent - no assist   Bed Mobility D/C Goal  Independent - no assist   Chair Transfer Current Touching / Steadying Assistance   Chair Transfer D/C Goal Independent - no assist   Locomotion Ambulation   Locomotion Primary Device Wheeled walker   Locomotion Distance (ft) 150 ft   Locomotion Current Touching / Steadying Assistance   Locomotion D/C Goal Independent - no assist   Stairs in Home 2  (entry, 20 to upper)   Railings Right   Stairs Attempted 11   Railings Right  (Both hands on one rail)   Stairs Current Touching / Steadying Assistance   Stairs D/C Goal Independent - no assist   Progress Towards Plan of Care Discharge Goals Progressing   Self Cares   Grooming Current Independent - no assist   Grooming D/C Goal Independent - no assist   Bathing Current Supervision - verbal cues   Bathing D/C Goal Independent - no assist   UE Dressing Current Supervision - verbal cues   UE Dressing D/C Goal Independent - no assist   LE Dressing Current Supervision - verbal cues   LE Dressing D/C Goal Independent - no assist   Toileting Current Supervision - verbal cues   Toileting D/C Goal Independent - no assist   Toilet Transfer Current Supervision - verbal cues   Toilet Transfer D/C Goal Independent - no assist   Tub or Shower Transfer Current Touching / Steadying Assistance   Tub or Shower Transfer D/C Goal Independent - no assist   Progress Towards Plan of Care Discharge Goals Progressing   Coping and Motivation   Coping Skills Fair   Motivation Self motivated   Coping/Motivation Discharge Goals offer support   Progress Towards Plan of Care Discharge Goals Progressing   Home and Community Roles   Home and Community Roles Pt. lives alone. Was indep. PTA   Home and Community Discharge Goals To return home safely with support   Progress towards plan of care discharge goals Progressing   Recommendations   Re-conference Date 06/15/21   Planned Discharge Destination Home  (DC 6/16)   Patient and care partner(s), as appropriate, to be informed of plan by the following team  member Physiatrist;   Signature Sheet Completed? Yes - to be scanned at discharge      None

## 2021-09-15 ENCOUNTER — EMERGENCY (EMERGENCY)
Facility: HOSPITAL | Age: 46
LOS: 1 days | Discharge: HOME | End: 2021-09-15
Attending: EMERGENCY MEDICINE
Payer: MEDICAID

## 2021-09-15 VITALS
OXYGEN SATURATION: 95 % | HEART RATE: 81 BPM | TEMPERATURE: 98 F | SYSTOLIC BLOOD PRESSURE: 127 MMHG | RESPIRATION RATE: 18 BRPM | DIASTOLIC BLOOD PRESSURE: 73 MMHG | WEIGHT: 179.9 LBS | HEIGHT: 65 IN

## 2021-09-15 PROCEDURE — 99285 EMERGENCY DEPT VISIT HI MDM: CPT

## 2021-09-15 PROCEDURE — 70486 CT MAXILLOFACIAL W/O DYE: CPT | Mod: MG

## 2021-09-15 PROCEDURE — 72125 CT NECK SPINE W/O DYE: CPT

## 2021-09-15 PROCEDURE — 71045 X-RAY EXAM CHEST 1 VIEW: CPT | Mod: 26

## 2021-09-15 PROCEDURE — 99284 EMERGENCY DEPT VISIT MOD MDM: CPT | Mod: 25

## 2021-09-15 PROCEDURE — 70450 CT HEAD/BRAIN W/O DYE: CPT

## 2021-09-15 PROCEDURE — 71045 X-RAY EXAM CHEST 1 VIEW: CPT

## 2021-09-15 PROCEDURE — G1004: CPT

## 2021-09-15 RX ORDER — ACETAMINOPHEN 500 MG
975 TABLET ORAL ONCE
Refills: 0 | Status: COMPLETED | OUTPATIENT
Start: 2021-09-15 | End: 2021-09-15

## 2021-09-15 RX ORDER — OXYCODONE HYDROCHLORIDE 5 MG/1
5 TABLET ORAL ONCE
Refills: 0 | Status: DISCONTINUED | OUTPATIENT
Start: 2021-09-15 | End: 2021-09-15

## 2021-09-15 RX ADMIN — OXYCODONE HYDROCHLORIDE 5 MILLIGRAM(S): 5 TABLET ORAL at 23:44

## 2021-09-15 RX ADMIN — Medication 975 MILLIGRAM(S): at 23:44
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
## 2021-09-15 NOTE — ED PROVIDER NOTE - CPE EDP RESP NORM
Tetanus booster administered to pt in right deltoid, tolerated well, given vaccine education. Also cleaned abrasions on her nose, under the nose, and chin. Then applied Bacitracin topical cream to the abrasions. Tolerated well by pt. Pt has no other complaints at this time. Waiting for radiology to perform CT scans. Call bell within reach, use explained. normal...

## 2021-09-15 NOTE — ED PROVIDER NOTE - MUSCULOSKELETAL, MLM
Pt with left sided rib pain guarding. Spine appears normal, range of motion is not limited, no muscle or joint tenderness

## 2021-09-15 NOTE — ED ADULT NURSE NOTE - NSIMPLEMENTINTERV_GEN_ALL_ED
Implemented All Fall Risk Interventions:  [place] to call system. Call bell, personal items and telephone within reach. Instruct patient to call for assistance. Room bathroom lighting operational. Non-slip footwear when patient is off stretcher. Physically safe environment: no spills, clutter or unnecessary equipment. Stretcher in lowest position, wheels locked, appropriate side rails in place. Provide visual cue, wrist band, yellow gown, etc. Monitor gait and stability. Monitor for mental status changes and reorient to person, place, and time. Review medications for side effects contributing to fall risk. Reinforce activity limits and safety measures with patient and family.
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
## 2021-09-15 NOTE — ED PROVIDER NOTE - PATIENT PORTAL LINK FT
You can access the FollowMyHealth Patient Portal offered by [facility] by registering at the following website: [email or web address]. By joining [employer]’s FollowMyHealth portal, you will also be able to view your health information using other applications (apps) compatible with our system.
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
## 2021-09-15 NOTE — ED PROVIDER NOTE - CARE PROVIDER_API CALL
Jovita Dennison)  Plastic Surgery  [address] [place] [address]  Phone: [phone]  Fax: [phone]  Follow Up Time:

## 2021-09-15 NOTE — ED PROVIDER NOTE - NSFOLLOWUPINSTRUCTIONS_ED_ALL_ED_FT
Apply ice to the nose for 10-15 minutes at a time as needed  Take ibuprofen 600mg every 6 hours and/or acetaminophen 1000mg every 6 hours as needed for pain  Follow up with plastic surgery  Do not blow your nose or place anything up the nose  Sleep with your head elevated  Return to the ER with any new, worsening or persistent symptoms.
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
## 2021-09-16 PROCEDURE — 70450 CT HEAD/BRAIN W/O DYE: CPT | Mod: 26

## 2021-09-16 PROCEDURE — 72125 CT NECK SPINE W/O DYE: CPT | Mod: 26

## 2021-09-16 PROCEDURE — 70486 CT MAXILLOFACIAL W/O DYE: CPT | Mod: 26,MG

## 2021-09-16 PROCEDURE — G1004: CPT

## 2022-09-04 ENCOUNTER — EMERGENCY (EMERGENCY)
Facility: HOSPITAL | Age: 47
LOS: 1 days | Discharge: HOME | End: 2022-09-04
Attending: EMERGENCY MEDICINE
Payer: MEDICAID

## 2022-09-04 VITALS
RESPIRATION RATE: 16 BRPM | HEIGHT: 65 IN | DIASTOLIC BLOOD PRESSURE: 81 MMHG | SYSTOLIC BLOOD PRESSURE: 127 MMHG | TEMPERATURE: 98 F | OXYGEN SATURATION: 99 % | WEIGHT: 138.89 LBS | HEART RATE: 80 BPM

## 2022-09-04 LAB — SARS-COV-2 RNA SPEC QL NAA+PROBE: SIGNIFICANT CHANGE UP

## 2022-09-04 PROCEDURE — U0003: CPT

## 2022-09-04 PROCEDURE — U0005: CPT

## 2022-09-04 PROCEDURE — 82962 GLUCOSE BLOOD TEST: CPT

## 2022-09-04 PROCEDURE — 99284 EMERGENCY DEPT VISIT MOD MDM: CPT

## 2022-09-04 PROCEDURE — 99285 EMERGENCY DEPT VISIT HI MDM: CPT
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
## 2022-09-04 NOTE — ED PROVIDER NOTE - PATIENT PORTAL LINK FT
You can access the FollowMyHealth Patient Portal offered by [facility] by registering at the following website: [email or web address]. By joining [employer]’s FollowMyHealth portal, you will also be able to view your health information using other applications (apps) compatible with our system.

## 2022-09-04 NOTE — ED PROVIDER NOTE - PROGRESS NOTE DETAILS
Claudia MCCORD: Patient reassesed--no complaints.  Vital signs normal.  Resting comfortably.  A&Ox3.  Speech clear. Gait normal.  Clinically sober.

## 2022-09-04 NOTE — ED ADULT NURSE NOTE - NSIMPLEMENTINTERV_GEN_ALL_ED
Implemented All Fall Risk Interventions:  [place] to call system. Call bell, personal items and telephone within reach. Instruct patient to call for assistance. Room bathroom lighting operational. Non-slip footwear when patient is off stretcher. Physically safe environment: no spills, clutter or unnecessary equipment. Stretcher in lowest position, wheels locked, appropriate side rails in place. Provide visual cue, wrist band, yellow gown, etc. Monitor gait and stability. Monitor for mental status changes and reorient to person, place, and time. Review medications for side effects contributing to fall risk. Reinforce activity limits and safety measures with patient and family.

## 2022-09-04 NOTE — ED PROVIDER NOTE - OBJECTIVE STATEMENT
46M h/o ETOH p/w intoxication. Admits to drinking alcohol. Denies drug use, SI/HI, trauma. Has no current complaints. Requesting covid swab, no symptoms.

## 2022-09-04 NOTE — ED ADULT TRIAGE NOTE - PRO INTERPRETER NEED 2
Detail Level: Detailed Plan: Being treated by rheumatologist with enbrel Initiate Treatment: Biotin 5000 daily with a daily multivitamin. Zinc 50mg daily with food. Rogaine OTC as directed on the box. Have rheumatologist check thyroid (per pt request) English

## 2022-11-10 ENCOUNTER — EMERGENCY (EMERGENCY)
Facility: HOSPITAL | Age: 47
LOS: 1 days | Discharge: HOME | End: 2022-11-10
Attending: EMERGENCY MEDICINE
Payer: MEDICAID

## 2022-11-10 VITALS
OXYGEN SATURATION: 97 % | RESPIRATION RATE: 18 BRPM | WEIGHT: 175.05 LBS | SYSTOLIC BLOOD PRESSURE: 133 MMHG | HEART RATE: 86 BPM | TEMPERATURE: 98 F | DIASTOLIC BLOOD PRESSURE: 74 MMHG

## 2022-11-10 VITALS
DIASTOLIC BLOOD PRESSURE: 75 MMHG | SYSTOLIC BLOOD PRESSURE: 140 MMHG | HEART RATE: 85 BPM | RESPIRATION RATE: 18 BRPM | OXYGEN SATURATION: 97 %

## 2022-11-10 PROCEDURE — 99284 EMERGENCY DEPT VISIT MOD MDM: CPT

## 2022-11-10 PROCEDURE — 82962 GLUCOSE BLOOD TEST: CPT

## 2022-11-10 PROCEDURE — 99285 EMERGENCY DEPT VISIT HI MDM: CPT

## 2022-11-10 RX ORDER — IBUPROFEN 200 MG
400 TABLET ORAL ONCE
Refills: 0 | Status: COMPLETED | OUTPATIENT
Start: 2022-11-10 | End: 2022-11-10

## 2022-11-10 RX ADMIN — Medication 400 MILLIGRAM(S): at 13:19

## 2022-11-10 NOTE — ED ADULT TRIAGE NOTE - CHIEF COMPLAINT QUOTE
Pt arrives intoxicated from [facility] without any complaints. Pt is calm and cooperative and is asking for help with detox.

## 2022-11-10 NOTE — ED PROVIDER NOTE - PATIENT PORTAL LINK FT
You can access the FollowMyHealth Patient Portal offered by [facility] by registering at the following website: [email or web address]. By joining [employer]’s FollowMyHealth portal, you will also be able to view your health information using other applications (apps) compatible with our system.

## 2022-11-10 NOTE — ED PROVIDER NOTE - OBJECTIVE STATEMENT
48 y/o M with PMH EtOH abuse presents for alcohol intoxication. He states [place] "ac[place] the [place]" and was drinking 6 tall boys today. He has no complaints, but is concerned as to how he is going to get back to the [employer] at "this time of night." I explained it's around noon and he has plenty of time to sober up. He denies abdominal pain. Patient is clearly intoxicated, no outward signs of trauma.

## 2022-11-10 NOTE — ED ADULT NURSE NOTE - NSIMPLEMENTINTERV_GEN_ALL_ED
Implemented All Fall Risk Interventions:  [place] to call system. Call bell, personal items and telephone within reach. Instruct patient to call for assistance. Room bathroom lighting operational. Non-slip footwear when patient is off stretcher. Physically safe environment: no spills, clutter or unnecessary equipment. Stretcher in lowest position, wheels locked, appropriate side rails in place. Provide visual cue, wrist band, yellow gown, etc. Monitor gait and stability. Monitor for mental status changes and reorient to person, place, and time. Review medications for side effects contributing to fall risk. Reinforce activity limits and safety measures with patient and family.

## 2022-11-10 NOTE — ED ADULT NURSE NOTE - CHIEF COMPLAINT QUOTE
Pt arrives intoxicated from [facility] without any complaints. Pt is calm and cooperative and is asking for help with detox.

## 2022-11-10 NOTE — ED ADULT NURSE NOTE - OBJECTIVE STATEMENT
assumed care of pt at 12:00. received pt in yellow gown, no belongings at bedside. pt reports drinking 8 beers at 6 am. daily drinker, reports drinking 8 beers daily. denies si/hi/auditory/visual hallucinations or drug use. rr even and unlabored. anox4. offers no medical complaints at this time. pt educated on plan of care, pt able to successfully teach back plan of care to RN, RN will continue to reeducate pt during hospital stay.

## 2022-11-10 NOTE — ED PROVIDER NOTE - CLINICAL SUMMARY MEDICAL DECISION MAKING FREE TEXT BOX
48 y/o M presents for EtOH intoxication, no outward signs of trauma, no complaints - will check accucheck and observe for sobriety.

## 2022-11-10 NOTE — ED PROVIDER NOTE - PHYSICAL EXAMINATION
Gen: Well appearing in NAD, intoxicated, hiccuping.  Head: NC/AT  Neck: trachea midline  Resp:  No distress  Ext: no deformities  Neuro:  A&O appears non focal  Skin:  Warm and dry as visualized  Psych:  Normal affect and mood

## 2022-11-21 NOTE — ED ADULT TRIAGE NOTE - NSWEIGHTCALCTOOLDRUG_GEN_A_CORE
used Transposition Flap Text: The defect edges were debeveled with a #15 scalpel blade.  Given the location of the defect and the proximity to free margins a transposition flap was deemed most appropriate.  Using a sterile surgical marker, an appropriate transposition flap was drawn incorporating the defect.    The area thus outlined was incised deep to adipose tissue with a #15 scalpel blade.  The skin margins were undermined to an appropriate distance in all directions utilizing iris scissors.
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
## 2022-12-12 NOTE — ED PROVIDER NOTE - NS ED MD EM SELECTION
Physical Therapy Visit    Visit Type: Daily Treatment Note  Visit: 4  Referring Provider: Andrez Pelayo MD  Medical Diagnosis (from order): Diagnosis Information    Diagnosis  721.3 (ICD-9-CM) - M47.816 (ICD-10-CM) - Lumbar spondylosis         SUBJECTIVE                                                                                                               Doing ok, looking at getting shots in the hip to  Assist with pain to the left hip. The lower back has been ok overall. exercises are going good.     Pain / Symptoms  - Pain rating (out of 10): Current: 5       OBJECTIVE                                                                                                                                       Treatment     Therapeutic Exercise  Nustep x 5 mins  Heel raises x 10  Incline stretch 3 x 15 sec  Bridges 2X10 5\" holds  Manual SKTC and piriformis stretch 3X30\" B  Active Straight Leg Raise with Quad Set - 2 x daily - 7 x weekly - 10 reps - 2 sets  Standing Hip Abduction, extension 2X10  Clamshells 2X10      All exercises cues to contract the lower abs    Manual Therapy   DTM to L glut med region. Lower back to the left in S/L    Skilled input: verbal instruction/cues, tactile instruction/cues and posture correction    Writer verbally educated and received verbal consent for hand placement, positioning of patient, and techniques to be performed today from patient for therapist position for techniques as described above and how they are pertinent to the patient's plan of care.    Home Exercise Program  · Supine Single Knee to Chest Stretch - 1 x daily - 7 x weekly - 3 reps - 1 sets - 20 seconds hold  · Supine Lower Trunk Rotation - 1 x daily - 7 x weekly - 2 sets - 10 reps - 10 seconds each position hold  · Active Straight Leg Raise with Quad Set - 2 x daily - 7 x weekly - 10 reps - 2 sets  · Standing Hip Abduction - 2 x daily - 7 x weekly - 10 reps - 2 sets      ASSESSMENT                                                                                                             Pt is improving with her overall strength and endurance with a decrease in lower back and left hip pain. Did get shots in the left hip which has decrease with the left hip marietta. Which is helping with her lower back pain.   Pain/symptoms after session (out of 10): 3  Education:   - Results of above outlined education: Verbalizes understanding and Demonstrates understanding    PLAN                                                                                                                           Suggestions for next session as indicated: Progress per plan of care, 1x week for 3 weeks for increase in strength and endurance to decrease with hip and lower back pain       Therapy procedure time and total treatment time can be found documented on the Time Entry flowsheet     [address] Detailed

## 2023-02-28 ENCOUNTER — EMERGENCY (EMERGENCY)
Facility: HOSPITAL | Age: 48
LOS: 1 days | Discharge: HOME | End: 2023-02-28
Attending: EMERGENCY MEDICINE
Payer: MEDICAID

## 2023-02-28 VITALS
DIASTOLIC BLOOD PRESSURE: 76 MMHG | HEART RATE: 83 BPM | RESPIRATION RATE: 20 BRPM | OXYGEN SATURATION: 94 % | SYSTOLIC BLOOD PRESSURE: 115 MMHG | TEMPERATURE: 98 F

## 2023-02-28 VITALS
HEART RATE: 84 BPM | SYSTOLIC BLOOD PRESSURE: 109 MMHG | TEMPERATURE: 98 F | DIASTOLIC BLOOD PRESSURE: 66 MMHG | OXYGEN SATURATION: 98 % | RESPIRATION RATE: 18 BRPM

## 2023-02-28 PROCEDURE — G1004: CPT

## 2023-02-28 PROCEDURE — 70450 CT HEAD/BRAIN W/O DYE: CPT | Mod: 26,MG,76

## 2023-02-28 PROCEDURE — 12002 RPR S/N/AX/GEN/TRNK2.6-7.5CM: CPT

## 2023-02-28 PROCEDURE — 99284 EMERGENCY DEPT VISIT MOD MDM: CPT | Mod: 25
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
## 2023-02-28 NOTE — ED ADULT NURSE NOTE - NSIMPLEMENTINTERV_GEN_ALL_ED
Implemented All Fall Risk Interventions:  [place] to call system. Call bell, personal items and telephone within reach. Instruct patient to call for assistance. Room bathroom lighting operational. Non-slip footwear when patient is off stretcher. Physically safe environment: no spills, clutter or unnecessary equipment. Stretcher in lowest position, wheels locked, appropriate side rails in place. Provide visual cue, wrist band, yellow gown, etc. Monitor gait and stability. Monitor for mental status changes and reorient to person, place, and time. Review medications for side effects contributing to fall risk. Reinforce activity limits and safety measures with patient and family.

## 2023-02-28 NOTE — ED PROVIDER NOTE - PHYSICAL EXAMINATION
Constitutional: Awake, alert, in no acute distress  Eyes: PERRL  HENT: right parietal scalp 5cm lac.  no facial tenderness, airway patent  Neck: no cervical spine tenderness, no palpable stepoff, no tracheal deviation  CV: no chest wall tenderness, no crepitus or subcutaneous emphysema.  RRR, no murmur, 2+ distal pulses in all extremities  Pulm: non-labored respirations, CTAB  Abdomen: soft, non-tender, non-distended, no ecchymosis  Back: no spinal tenderness, no palpable stepoff  Extremities: stable pelvis, no extremity tenderness or deformity  Skin: no rash  Neuro: AAOx3, GCS 15, moving all extremities equally, no focal neurologic deficit

## 2023-02-28 NOTE — ED PROVIDER NOTE - PATIENT PORTAL LINK FT
You can access the FollowMyHealth Patient Portal offered by [facility] by registering at the following website: [email or web address]. By joining [employer]’s FollowMyHealth portal, you will also be able to view your health information using other applications (apps) compatible with our system.

## 2023-02-28 NOTE — ED PROVIDER NOTE - NSFOLLOWUPINSTRUCTIONS_ED_ALL_ED_FT
Retire las suturas en 10 a 14 semanas.    Regrese si empeora el dolor, fiebre, escalofríos, náuseas, vómitos, secreción o enrojecimiento de la herida.    Remoción de la sutura, cuidados posteriores    Suture Removal, Care After      La siguiente información ofrece orientación sobre cómo cuidarse después del procedimiento. El médico también podrá darle instrucciones más específicas. Comuníquese con el médico si tiene problemas o preguntas.      ¿Qué puedo esperar después del procedimiento?    Después de que le retiren los puntos (suturas), es normal tener lo siguiente:  •Molestias e hinchazón en la lalitha.      •Leve enrojecimiento en la lalitha de la herida.        Siga estas indicaciones en espino casa:    Si tiene un vendaje:     •Lávese las selvin con agua y jabón zehra al menos 20 segundos antes y después de cambiarse la venda (vendaje). Use desinfectante para selvin si no dispone de agua y jabón.      •Cambie el vendaje zoya se lo haya indicado el médico. Si el vendaje se moja, se ensucia o tiene mal olor, cámbielo tan pronto zoya pueda.      •Si el vendaje esta adherido a la piel, aplique agua limpia tibia sobre el vendaje hasta que se afloje y se pueda retirar sin separar los bordes de la herida. Seque maximiliano la lalitha con addison toalla limpia y suave, dando golpecitos. No frote la herida, ya que puede sangrar.        Cuidado de la herida   Two stitched wounds. One is normal. The other is red with pus and infected. •Controle la herida todos los días para descartar signos de infección. Esté atento a los siguientes signos:  •Aumento del enrojecimiento, la hinchazón o el dolor.      •Líquido o sandra.      •Calor, erupción cutánea o dureza en el lugar de la herida de reciente aparición.      • Pus o mal olor.        •Lávese las selvin con agua y jabón zehra al menos 20 segundos antes y después de tocar la herida. Use desinfectante para selvin si no dispone de agua y jabón.      •Mantenga la herida limpia y seca. Limpie y seque la herida con golpecitos zoya se lo haya indicado el médico.      •Solo aplique un ungüento o crema según las indicaciones del médico.      •Si se aplicaron bandas adhesivas o goma para cerrar la piel después de retirar las suturas, déjelas en el lugar. Angel vez deban dejarse puestos en la piel zehra 2 semanas o más. Si los bordes de las tiras adhesivas empiezan a despegarse y enroscarse, puede recortar los que estén sueltos. No retire las tiras adhesivas por completo a menos que el médico se lo indique.      •Continúe protegiendo la herida de lesiones.      • No se toque la herida. [place] puede causar addison infección.      Ramirez     • No tome ramirez de inmersión, no nade ni use el jacuzzi hasta que el médico lo autorice. Pregúntele al médico si puede ducharse.    •Siga estos pasos para ducharse:  •Si tiene un vendaje, quíteselo antes de entrar en la ducha.      •En la ducha, permita que el agua jabonosa caiga sobre la herida. Evite frotar la herida.      •Cuando salga de la ducha, seque la herida dando golpecitos con addison toalla limpia.      •Vuelva a aplicar un vendaje sobre la herida, si es necesario.        Cuidado de la cicatriz     Addison vez que la herida haya cicatrizado por completo, ayude a reducir el tamaño de la cicatriz de la siguiente manera:  •Use protector solar sobre la cicatriz o cúbrala con ropa cuando se encuentre en el exterior. Las cicatrices se queman fácilmente con el sol, lo que puede empeorar la cicatrización.      •Masajee suavemente la lalitha de la cicatriz. [place] puede reducir el grosor de la cicatriz.      Indicaciones generales    •Use los medicamentos de venta tatyana y los recetados solamente zoya se lo haya indicado el médico.      •Concurra a todas las visitas de seguimiento. [place] es importante.        Comuníquese con un médico si:    •Tiene más enrojecimiento, hinchazón o dolor alrededor de la herida.      •Observa líquido o sandra que salen de la herida.      •Experimenta calor, erupción cutánea o dureza en el lugar de la herida que antes no se manifestaban.      •Observa pus o percibe mal olor que salen de la herida.      •La herida se abre.        Solicite ayuda de inmediato si:    •Tiene fiebre o escalofríos.      •Tiene líneas brady que salen de la herida.        Resumen    •Después de que le retiren los puntos, es común tener molestias e hinchazón en la lalitha.      •Lávese las selvin con agua y jabón antes de cambiar las vendas (vendaje).      •Mantenga la herida limpia y seca. No tome ramirez de inmersión, no nade ni use el jacuzzi hasta que el médico lo autorice.      Esta información no tiene zoya fin reemplazar el consejo del médico. Asegúrese de hacerle al médico cualquier pregunta que tenga.

## 2023-02-28 NOTE — ED PROVIDER NOTE - OBJECTIVE STATEMENT
48 yo male with hx of etoh abuse presents to ED after hitting head. Patient states that he is intoxicated and drank alcohol today. He lost his balance and fell hitting his head. now with laceration. Denies other substance use. Denies anticoagulant use.

## 2023-02-28 NOTE — ED PROVIDER NOTE - CLINICAL SUMMARY MEDICAL DECISION MAKING FREE TEXT BOX
Chronic alcohol use. presented for laceration s/p fall and hit head. CT head priority done. No acute pathology on CT head. Laceration repaired. Patient to be observed until sobriety.

## 2023-02-28 NOTE — ED ADULT NURSE NOTE - OBJECTIVE STATEMENT
pt presents to ed s/p fall. pt with lac to R side of head.  +etoh.  unable to obtain proper story from pt 2/2 intoxication.  NAD at this time.
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
## 2023-03-01 ENCOUNTER — EMERGENCY (EMERGENCY)
Facility: HOSPITAL | Age: 48
LOS: 1 days | Discharge: HOME | End: 2023-03-01
Attending: EMERGENCY MEDICINE
Payer: MEDICAID

## 2023-03-01 VITALS
SYSTOLIC BLOOD PRESSURE: 114 MMHG | HEART RATE: 87 BPM | DIASTOLIC BLOOD PRESSURE: 67 MMHG | RESPIRATION RATE: 18 BRPM | OXYGEN SATURATION: 96 % | TEMPERATURE: 99 F

## 2023-03-01 LAB
ALBUMIN SERPL ELPH-MCNC: 4.3 G/DL — SIGNIFICANT CHANGE UP (ref 3.3–5.2)
ALP SERPL-CCNC: 184 U/L — HIGH (ref 40–120)
ALT FLD-CCNC: 55 U/L — HIGH
ANION GAP SERPL CALC-SCNC: 15 MMOL/L — SIGNIFICANT CHANGE UP (ref 5–17)
AST SERPL-CCNC: 117 U/L — HIGH
BASOPHILS # BLD AUTO: 0.04 K/UL — SIGNIFICANT CHANGE UP (ref 0–0.2)
BASOPHILS NFR BLD AUTO: 0.6 % — SIGNIFICANT CHANGE UP (ref 0–2)
BILIRUB SERPL-MCNC: 0.6 MG/DL — SIGNIFICANT CHANGE UP (ref 0.4–2)
BUN SERPL-MCNC: 8.1 MG/DL — SIGNIFICANT CHANGE UP (ref 8–20)
CALCIUM SERPL-MCNC: 8.1 MG/DL — LOW (ref 8.4–10.5)
CHLORIDE SERPL-SCNC: 103 MMOL/L — SIGNIFICANT CHANGE UP (ref 96–108)
CO2 SERPL-SCNC: 22 MMOL/L — SIGNIFICANT CHANGE UP (ref 22–29)
CREAT SERPL-MCNC: 0.47 MG/DL — LOW (ref 0.5–1.3)
EGFR: 129 ML/MIN/1.73M2 — SIGNIFICANT CHANGE UP
EOSINOPHIL # BLD AUTO: 0.04 K/UL — SIGNIFICANT CHANGE UP (ref 0–0.5)
EOSINOPHIL NFR BLD AUTO: 0.6 % — SIGNIFICANT CHANGE UP (ref 0–6)
GLUCOSE SERPL-MCNC: 95 MG/DL — SIGNIFICANT CHANGE UP (ref 70–99)
HCT VFR BLD CALC: 36.7 % — LOW (ref 39–50)
HGB BLD-MCNC: 13.4 G/DL — SIGNIFICANT CHANGE UP (ref 13–17)
IMM GRANULOCYTES NFR BLD AUTO: 0.4 % — SIGNIFICANT CHANGE UP (ref 0–0.9)
LYMPHOCYTES # BLD AUTO: 1.89 K/UL — SIGNIFICANT CHANGE UP (ref 1–3.3)
LYMPHOCYTES # BLD AUTO: 27 % — SIGNIFICANT CHANGE UP (ref 13–44)
MCHC RBC-ENTMCNC: 34.9 PG — HIGH (ref 27–34)
MCHC RBC-ENTMCNC: 36.5 GM/DL — HIGH (ref 32–36)
MCV RBC AUTO: 95.6 FL — SIGNIFICANT CHANGE UP (ref 80–100)
MONOCYTES # BLD AUTO: 0.5 K/UL — SIGNIFICANT CHANGE UP (ref 0–0.9)
MONOCYTES NFR BLD AUTO: 7.1 % — SIGNIFICANT CHANGE UP (ref 2–14)
NEUTROPHILS # BLD AUTO: 4.51 K/UL — SIGNIFICANT CHANGE UP (ref 1.8–7.4)
NEUTROPHILS NFR BLD AUTO: 64.3 % — SIGNIFICANT CHANGE UP (ref 43–77)
PLATELET # BLD AUTO: 129 K/UL — LOW (ref 150–400)
POTASSIUM SERPL-MCNC: 3.4 MMOL/L — LOW (ref 3.5–5.3)
POTASSIUM SERPL-SCNC: 3.4 MMOL/L — LOW (ref 3.5–5.3)
PROT SERPL-MCNC: 8 G/DL — SIGNIFICANT CHANGE UP (ref 6.6–8.7)
RBC # BLD: 3.84 M/UL — LOW (ref 4.2–5.8)
RBC # FLD: 12.2 % — SIGNIFICANT CHANGE UP (ref 10.3–14.5)
SODIUM SERPL-SCNC: 140 MMOL/L — SIGNIFICANT CHANGE UP (ref 135–145)
WBC # BLD: 7.01 K/UL — SIGNIFICANT CHANGE UP (ref 3.8–10.5)
WBC # FLD AUTO: 7.01 K/UL — SIGNIFICANT CHANGE UP (ref 3.8–10.5)

## 2023-03-01 PROCEDURE — 93005 ELECTROCARDIOGRAM TRACING: CPT

## 2023-03-01 PROCEDURE — 36415 COLL VENOUS BLD VENIPUNCTURE: CPT

## 2023-03-01 PROCEDURE — 85025 COMPLETE CBC W/AUTO DIFF WBC: CPT

## 2023-03-01 PROCEDURE — 93010 ELECTROCARDIOGRAM REPORT: CPT | Mod: 76

## 2023-03-01 PROCEDURE — 71045 X-RAY EXAM CHEST 1 VIEW: CPT | Mod: 26

## 2023-03-01 PROCEDURE — 71045 X-RAY EXAM CHEST 1 VIEW: CPT

## 2023-03-01 PROCEDURE — 99285 EMERGENCY DEPT VISIT HI MDM: CPT

## 2023-03-01 PROCEDURE — 80053 COMPREHEN METABOLIC PANEL: CPT

## 2023-03-01 PROCEDURE — 93010 ELECTROCARDIOGRAM REPORT: CPT

## 2023-03-01 RX ORDER — POTASSIUM CHLORIDE 20 MEQ
20 PACKET (EA) ORAL ONCE
Refills: 0 | Status: COMPLETED | OUTPATIENT
Start: 2023-03-01 | End: 2023-03-01

## 2023-03-01 RX ORDER — ACETAMINOPHEN 500 MG
975 TABLET ORAL ONCE
Refills: 0 | Status: COMPLETED | OUTPATIENT
Start: 2023-03-01 | End: 2023-03-01

## 2023-03-01 RX ADMIN — Medication 20 MILLIEQUIVALENT(S): at 04:53

## 2023-03-01 RX ADMIN — Medication 975 MILLIGRAM(S): at 03:57

## 2023-03-01 NOTE — ED PROVIDER NOTE - PATIENT PORTAL LINK FT
You can access the FollowMyHealth Patient Portal offered by [facility] by registering at the following website: [email or web address]. By joining [employer]’s FollowMyHealth portal, you will also be able to view your health information using other applications (apps) compatible with our system.
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
## 2023-03-01 NOTE — ED ADULT NURSE NOTE - OBJECTIVE STATEMENT
pt presents to ed after being discharged an hour ago s/p fall.  pt states he was walking home and wasn't feeling right, and fell down.  pt denies hitting his head again.  denies any complaints. NAD at this time.

## 2023-03-01 NOTE — ED PROVIDER NOTE - NSFOLLOWUPINSTRUCTIONS_ED_ALL_ED_FT
1. Return to ED for worsening, progressive or any other concerning symptoms   2. Follow up with your primary care doctor in 2-3days   3. Follow up with the [facility] [id number] [place] [address]  Phone: [phone]   4. Follow up with [facility] [phone].     Syncope    Syncope is when you temporarily lose consciousness, also called fainting or passing out. It is caused by a sudden decrease in blood flow to the brain. Even though most causes of syncope are not dangerous, syncope can possibly be a sign of a serious medical problem. Signs that you may be about to faint include feeling dizzy, lightheaded, nausea, visual changes, or cold/clammy skin. Do not drive, operate heavy machinery, or play sports until your health care provider says it is okay.    SEEK IMMEDIATE MEDICAL CARE IF YOU HAVE ANY OF THE FOLLOWING SYMPTOMS: severe headache, pain in your chest/abdomen/back, bleeding from your mouth or rectum, palpitations, shortness of breath, pain with breathing, seizure, confusion, or trouble walking.

## 2023-03-01 NOTE — ED PROVIDER NOTE - NS ED ROS FT
Constitutional: no fever, no chills  Head: NC, Laceration on right parietal intact staples  Eyes: no redness   ENMT: no nasal congestion/drainage, no sore throat   CV: no chest pain, no edema  Resp: no cough, no dyspnea  GI: no abdominal pain, no nausea, no vomiting, no diarrhea  : no dysuria, no hematuria   Skin: no lesions, no rashes   Neuro: no LOC, no headache, no sensory deficits, no weakness

## 2023-03-01 NOTE — ED PROVIDER NOTE - OBJECTIVE STATEMENT
47 y m with pmh of etoh abuse presenting for fall. Pt was walking home from the hospital and felt lightheaded and fell to the side. Pt was able to through out his hands. Pt denies prodrome, including cp, sob, n/v, ab pain, ha, f/c, dysuria.

## 2023-03-01 NOTE — ED PROVIDER NOTE - PHYSICAL EXAMINATION
General: well appearing, NAD  Head: NC, Laceration on right parietal intact staples, no new tenderness,  EENT: EOMI, no scleral icterus  Cardiac: RRR, no apparent murmurs, no lower extremity edema  Respiratory: CTABL, no respiratory distress   Abdomen: soft, ND, NT, nonperitonitic  MSK/Vascular: full ROM, soft compartments, warm extremities, no ctl tenderness, no chest/ab tenderness, no extremity tenderness x4  Neuro: AAOx3, sensation to light touch intact  Psych: calm, cooperative

## 2023-03-01 NOTE — ED ADULT NURSE NOTE - NSIMPLEMENTINTERV_GEN_ALL_ED
Implemented All Universal Safety Interventions:  [place] to call system. Call bell, personal items and telephone within reach. Instruct patient to call for assistance. Room bathroom lighting operational. Non-slip footwear when patient is off stretcher. Physically safe environment: no spills, clutter or unnecessary equipment. Stretcher in lowest position, wheels locked, appropriate side rails in place.

## 2023-03-01 NOTE — ED PROVIDER NOTE - ATTENDING CONTRIBUTION TO CARE
I, Alisha Sierra, have personally seen and examined this patient. I have fully participated in the care of this patient. I have reviewed all pertinent clinical information, including history, physical exam, plan and the Resident's note and agree except as noted below.     46yo M with h/o alcohol abuse just DC from ED earlier after fall and head injury with staples. Pt was walking home and felt dizzy/near syncope, fell to side and caught self with his hands. no LOC. no CP/SOB. denies any bleeding. was able to get up and walk back to hospital. mild HA at site of injury   PE- +hematoma to rt scalp with staples no active bleeding, CTAB, RRR,   labs notable for mild transaminitis AST>ALT likely due to alcohol abuse. mild thrombocytopenia. had CT head earlier tonight and no further head injury no need to repeat at this time. likely dizziness related to concussion. ekg no concerning arrythmias. will d/c with outpt Follow up

## 2023-03-01 NOTE — ED ADULT NURSE NOTE - CHIEF COMPLAINT QUOTE
multiple episodes of dizziness, nausea, chest pain x 2 days. hx HTN, neck surgery (this year). neuro intact

## 2023-03-07 ENCOUNTER — EMERGENCY (EMERGENCY)
Facility: HOSPITAL | Age: 48
LOS: 1 days | Discharge: HOME | End: 2023-03-07
Attending: EMERGENCY MEDICINE
Payer: MEDICAID

## 2023-03-07 VITALS
TEMPERATURE: 98 F | RESPIRATION RATE: 18 BRPM | HEART RATE: 86 BPM | DIASTOLIC BLOOD PRESSURE: 77 MMHG | OXYGEN SATURATION: 95 % | SYSTOLIC BLOOD PRESSURE: 135 MMHG

## 2023-03-07 LAB
ANION GAP SERPL CALC-SCNC: 15 MMOL/L — SIGNIFICANT CHANGE UP (ref 5–17)
APAP SERPL-MCNC: <3 UG/ML — LOW (ref 10–26)
BASOPHILS # BLD AUTO: 0.06 K/UL — SIGNIFICANT CHANGE UP (ref 0–0.2)
BASOPHILS NFR BLD AUTO: 1.8 % — SIGNIFICANT CHANGE UP (ref 0–2)
BUN SERPL-MCNC: 5.7 MG/DL — LOW (ref 8–20)
CALCIUM SERPL-MCNC: 8.5 MG/DL — SIGNIFICANT CHANGE UP (ref 8.4–10.5)
CHLORIDE SERPL-SCNC: 97 MMOL/L — SIGNIFICANT CHANGE UP (ref 96–108)
CO2 SERPL-SCNC: 26 MMOL/L — SIGNIFICANT CHANGE UP (ref 22–29)
CREAT SERPL-MCNC: 0.44 MG/DL — LOW (ref 0.5–1.3)
EGFR: 132 ML/MIN/1.73M2 — SIGNIFICANT CHANGE UP
EOSINOPHIL # BLD AUTO: 0.03 K/UL — SIGNIFICANT CHANGE UP (ref 0–0.5)
EOSINOPHIL NFR BLD AUTO: 0.9 % — SIGNIFICANT CHANGE UP (ref 0–6)
ETHANOL SERPL-MCNC: 331 MG/DL — HIGH (ref 0–9)
GIANT PLATELETS BLD QL SMEAR: PRESENT — SIGNIFICANT CHANGE UP
GLUCOSE SERPL-MCNC: 99 MG/DL — SIGNIFICANT CHANGE UP (ref 70–99)
HCT VFR BLD CALC: 36.8 % — LOW (ref 39–50)
HGB BLD-MCNC: 13.3 G/DL — SIGNIFICANT CHANGE UP (ref 13–17)
LYMPHOCYTES # BLD AUTO: 1.02 K/UL — SIGNIFICANT CHANGE UP (ref 1–3.3)
LYMPHOCYTES # BLD AUTO: 30.7 % — SIGNIFICANT CHANGE UP (ref 13–44)
MANUAL SMEAR VERIFICATION: SIGNIFICANT CHANGE UP
MCHC RBC-ENTMCNC: 35.5 PG — HIGH (ref 27–34)
MCHC RBC-ENTMCNC: 36.1 GM/DL — HIGH (ref 32–36)
MCV RBC AUTO: 98.1 FL — SIGNIFICANT CHANGE UP (ref 80–100)
MONOCYTES # BLD AUTO: 0.2 K/UL — SIGNIFICANT CHANGE UP (ref 0–0.9)
MONOCYTES NFR BLD AUTO: 6.1 % — SIGNIFICANT CHANGE UP (ref 2–14)
NEUTROPHILS # BLD AUTO: 1.81 K/UL — SIGNIFICANT CHANGE UP (ref 1.8–7.4)
NEUTROPHILS NFR BLD AUTO: 54.4 % — SIGNIFICANT CHANGE UP (ref 43–77)
PLAT MORPH BLD: NORMAL — SIGNIFICANT CHANGE UP
PLATELET # BLD AUTO: 170 K/UL — SIGNIFICANT CHANGE UP (ref 150–400)
POTASSIUM SERPL-MCNC: 3.9 MMOL/L — SIGNIFICANT CHANGE UP (ref 3.5–5.3)
POTASSIUM SERPL-SCNC: 3.9 MMOL/L — SIGNIFICANT CHANGE UP (ref 3.5–5.3)
RBC # BLD: 3.75 M/UL — LOW (ref 4.2–5.8)
RBC # FLD: 12.1 % — SIGNIFICANT CHANGE UP (ref 10.3–14.5)
RBC BLD AUTO: NORMAL — SIGNIFICANT CHANGE UP
SALICYLATES SERPL-MCNC: <0.6 MG/DL — LOW (ref 10–20)
SARS-COV-2 RNA SPEC QL NAA+PROBE: SIGNIFICANT CHANGE UP
SMUDGE CELLS # BLD: PRESENT — SIGNIFICANT CHANGE UP
SODIUM SERPL-SCNC: 138 MMOL/L — SIGNIFICANT CHANGE UP (ref 135–145)
VARIANT LYMPHS # BLD: 6.1 % — HIGH (ref 0–6)
WBC # BLD: 3.32 K/UL — LOW (ref 3.8–10.5)
WBC # FLD AUTO: 3.32 K/UL — LOW (ref 3.8–10.5)

## 2023-03-07 PROCEDURE — 99223 1ST HOSP IP/OBS HIGH 75: CPT

## 2023-03-07 PROCEDURE — 93010 ELECTROCARDIOGRAM REPORT: CPT

## 2023-03-07 RX ORDER — ACETAMINOPHEN 500 MG
650 TABLET ORAL ONCE
Refills: 0 | Status: COMPLETED | OUTPATIENT
Start: 2023-03-07 | End: 2023-03-07

## 2023-03-07 RX ORDER — IBUPROFEN 200 MG
400 TABLET ORAL ONCE
Refills: 0 | Status: COMPLETED | OUTPATIENT
Start: 2023-03-07 | End: 2023-03-07

## 2023-03-07 RX ADMIN — Medication 400 MILLIGRAM(S): at 22:18

## 2023-03-07 RX ADMIN — Medication 400 MILLIGRAM(S): at 23:18

## 2023-03-07 RX ADMIN — Medication 650 MILLIGRAM(S): at 12:46

## 2023-03-07 NOTE — ED CDU PROVIDER INITIAL DAY NOTE - OBJECTIVE STATEMENT
Patient is a 46 yo male with PMHx EtOH abuse, recent visit for fall s/p 6 scalp staples brought in from group home with SI after drinking alcohol. As per EMS patient is coming in from group home with SI and hallucinations; patient currently calm, cooperative. States he had several drinks today after which he heard the devil speaking to him. Patient also requesting to have his staples removed as they were placed 7 days ago. Denies SI/HI/delusions, alternate drug use, any other PMHx, allergies. Denies fevers, chills, dizziness, lightheadedness, dysphagia, dysarthria, diplopia, photophobia, syncope, cough, congestion, SOB, CP, abdominal pain, neck pain, back pain, diarrhea, dysuria, hematuria, hematochezia, hematemesis, n/v, recent travel, sick contacts, leg swelling.

## 2023-03-07 NOTE — ED PROVIDER NOTE - CLINICAL SUMMARY MEDICAL DECISION MAKING FREE TEXT BOX
Patient is a 46 yo male with PMHx EtOH abuse, recent visit for fall s/p 6 scalp staples brought in from group home with SI after drinking alcohol. As per EMS patient is coming in from group home with SI and hallucinations; patient currently calm, cooperative. States he had several drinks today after which he heard the devil speaking to him. Patient also requesting to have his staples removed as they were placed 7 days ago. VSS, well appearing, no SI/HI.     Will consult , r/o intoxication with UDS BAL, check labs, r/o electrolyte abnormalities, remove staples, reassess.

## 2023-03-07 NOTE — ED ADULT NURSE NOTE - CHIEF COMPLAINT QUOTE
47M biba from day program for SI. Pt +SI, +hallucinations reports voices tell him to kill himself. +ETOH last drink 4H pta. Pt with staples to healing laceration to top right scalp from fall 7 days PTA, was seen here for same.

## 2023-03-07 NOTE — ED PROVIDER NOTE - PHYSICAL EXAMINATION
Constitutional: Anxious appearing, awake, alert, oriented to person, place, and time/situation and in no apparent distress  ENMT: Airway patent nasal mucosa clear. Mouth with normal mucosa. Throat has no vesicles no oropharyngeal exudates and uvula is midline. No blood in the oropharynx  EYES: clear bilaterally, pupils equal, round and reactive to light  Cardiac: Regular rate, regular rhythm. Heart sounds S1, S2. No murmurs, rubs or gallops. Good capillary refill, 2+ pulses, no peripheral edema  Respiratory: Lungs CTAB, no use of accessory muscles, no crackles, satting 95% on RA in no distress  Gastrointestinal: Abdomen nondistended, non-tender, no rebound guarding or peritoneal signs  Genitourinary: No CVA tenderness, pelvis nontender, bladder nondistended  Musculoskeletal: Spine appears normal, range of motion is not limited, no muscle or joint tenderness  Neurological: Alert and oriented, no focal deficits, no motor or sensory deficits. CN 2-12 intact, PERRLA, EOMI, No FND, moving all extremities equally, sensation intact, No dysmetria, no ataxia, negative heel-shin, 5/5 strength   Skin: 6 staples along R parietal scalp, c/d/i   Psych: No SI/HI/delusions. Patient endorsing hearing the devil speak to him after drinking alcohol. Calm cooperative in triage, following commands

## 2023-03-07 NOTE — ED ADULT NURSE NOTE - OBJECTIVE STATEMENT
A&Ox4, RR even and unlabored. Skin is warm and dry. Staples noted to top of right side of head from 7 days ago. PT reports drinking 4 hours ago and having thoughts of SI. Reports hearing the devil tell him to kill himself. No plan in place. Denies drug use or HI. Dressed in yellow gown and all belongings removed and secured. A&Ox4, RR even and unlabored. Skin is warm and dry. Staples noted to top of right side of head from 7 days ago, from falls. PT reports drinking 4 hours ago and having thoughts of SI. Reports hearing the devil tell him to kill himself. No plan in place. Denies drug use or HI. Dressed in yellow gown and all belongings removed and secured. Arrived to  in yellow gown escorted by RN and security. V/S: temp 98, pulse 93, Bp 151/76, resp 18, Spo2 93 Alcohol intoxication, suicidal thoughts, auditory hallucination.

## 2023-03-07 NOTE — ED ADULT TRIAGE NOTE - CHIEF COMPLAINT QUOTE
47M biba from day program for SI. Pt +SI, +hallucinations reports voices tell him to kill himself. +ETOH last drink 4H pta 47M biba from day program for SI. Pt +SI, +hallucinations reports voices tell him to kill himself. +ETOH last drink 4H pta. Pt with staples to healing laceration to top right scalp from fall 7 days PTA, was seen here for same.

## 2023-03-07 NOTE — ED CDU PROVIDER INITIAL DAY NOTE - PHYSICAL EXAMINATION
Called pt wife to notify her that she's allowed to come back and sit with the pt.       Marly Frances RN  01/29/22 [id number]
Constitutional: Anxious appearing, awake, alert, oriented to person, place, and time/situation and in no apparent distress  ENMT: Airway patent nasal mucosa clear. Mouth with normal mucosa. Throat has no vesicles no oropharyngeal exudates and uvula is midline. No blood in the oropharynx  EYES: clear bilaterally, pupils equal, round and reactive to light  Cardiac: Regular rate, regular rhythm. Heart sounds S1, S2. No murmurs, rubs or gallops. Good capillary refill, 2+ pulses, no peripheral edema  Respiratory: Lungs CTAB, no use of accessory muscles, no crackles, satting 95% on RA in no distress  Gastrointestinal: Abdomen nondistended, non-tender, no rebound guarding or peritoneal signs  Genitourinary: No CVA tenderness, pelvis nontender, bladder nondistended  Musculoskeletal: Spine appears normal, range of motion is not limited, no muscle or joint tenderness  Neurological: Alert and oriented, no focal deficits, no motor or sensory deficits. CN 2-12 intact, PERRLA, EOMI, No FND, moving all extremities equally, sensation intact, No dysmetria, no ataxia, negative heel-shin, 5/5 strength   Skin: 6 staples along R parietal scalp, c/d/i   Psych: No SI/HI/delusions. Patient endorsing hearing the devil speak to him after drinking alcohol. Calm cooperative in triage, following commands

## 2023-03-07 NOTE — ED PROVIDER NOTE - ATTENDING CONTRIBUTION TO CARE
Miko: I performed a face to face evaluation of this patient and performed a full history and physical examination on the patient.  I agree with the resident's history, physical examination, and plan of the patient unless otherwise noted. My brief assessment is as follows: biba for etoh and si. pt states he saw the devil and it was telling him to hurt himself. denies it actively. old scalp lac with staples from 7 days ago. denies other complaints. non toxic, old healing scalp wound, ctab, rrr, abd benign. neuro intact. labs, medical clearance, psych

## 2023-03-07 NOTE — ED ADULT NURSE NOTE - HPI (INCLUDE ILLNESS QUALITY, SEVERITY, DURATION, TIMING, CONTEXT, MODIFYING FACTORS, ASSOCIATED SIGNS AND SYMPTOMS)
A&Ox4, RR even and unlabored. Skin is warm and dry. Staples noted to top of right side of head from 7 days ago, from falls. PT reports drinking 4 hours ago and having thoughts of SI. Reports hearing the devil tell him to kill himself. No plan in place. Denies drug use or HI. Dressed in yellow gown and all belongings removed and secured. Arrived to  in yellow gown escorted by RN and security. V/S: temp 98, pulse 93, Bp 151/76, resp 18, Spo2 93 Arrived to  in yellow gown escorted by RN and security. V/S: temp 98, pulse 93, Bp 151/76, resp 18, Spo2 93. Patient was medically cleared by attending from ED. Patient came with a score of 13 for CIWA. Patient report auditory hallucination and suicidal thoughts. Patient is alert and oriented, able to make needs known, patient was brought to  for psych evaluation.

## 2023-03-07 NOTE — ED CDU PROVIDER INITIAL DAY NOTE - CLINICAL SUMMARY MEDICAL DECISION MAKING FREE TEXT BOX
48 y/o male from [facility] with intoxication and making suicidal statements. healing old lac wound with some staples removed but still healing so 2 left in. awaiting sobriety, then psych eval.

## 2023-03-07 NOTE — ED PROVIDER NOTE - OBJECTIVE STATEMENT
No Patient is a 48 yo male with PMHx EtOH abuse, recent visit for fall s/p 6 scalp staples bright in from group home with SI after drinking alcohol Patient is a 48 yo male with PMHx EtOH abuse, recent visit for fall s/p 6 scalp staples brought in from group home with SI after drinking alcohol. As per EMS patient is coming in from group home with SI and hallucinations; patient currently calm, cooperative. States he had several drinks today after which he heard the devil speaking to him. Patient also requesting to have his staples removed as they were placed 7 days ago. Denies SI/HI/delusions, alternate drug use, any other PMHx, allergies. Denies fevers, chills, dizziness, lightheadedness, dysphagia, dysarthria, diplopia, photophobia, syncope, cough, congestion, SOB, CP, abdominal pain, neck pain, back pain, diarrhea, dysuria, hematuria, hematochezia, hematemesis, n/v, recent travel, sick contacts, leg swelling.

## 2023-03-08 VITALS
RESPIRATION RATE: 18 BRPM | DIASTOLIC BLOOD PRESSURE: 81 MMHG | HEART RATE: 79 BPM | TEMPERATURE: 98 F | SYSTOLIC BLOOD PRESSURE: 130 MMHG | OXYGEN SATURATION: 99 %

## 2023-03-08 DIAGNOSIS — R45.86 EMOTIONAL LABILITY: ICD-10-CM

## 2023-03-08 DIAGNOSIS — F10.20 ALCOHOL DEPENDENCE, UNCOMPLICATED: ICD-10-CM

## 2023-03-08 LAB
APPEARANCE UR: ABNORMAL
BACTERIA # UR AUTO: ABNORMAL
BILIRUB UR-MCNC: NEGATIVE — SIGNIFICANT CHANGE UP
COLOR SPEC: YELLOW — SIGNIFICANT CHANGE UP
COMMENT - URINE: SIGNIFICANT CHANGE UP
DIFF PNL FLD: NEGATIVE — SIGNIFICANT CHANGE UP
EPI CELLS # UR: SIGNIFICANT CHANGE UP
GLUCOSE UR QL: NEGATIVE MG/DL — SIGNIFICANT CHANGE UP
KETONES UR-MCNC: ABNORMAL
LEUKOCYTE ESTERASE UR-ACNC: NEGATIVE — SIGNIFICANT CHANGE UP
NITRITE UR-MCNC: NEGATIVE — SIGNIFICANT CHANGE UP
PCP SPEC-MCNC: SIGNIFICANT CHANGE UP
PH UR: 9 — HIGH (ref 5–8)
PROT UR-MCNC: 30 MG/DL
RBC CASTS # UR COMP ASSIST: NEGATIVE /HPF — SIGNIFICANT CHANGE UP (ref 0–4)
SP GR SPEC: 1.01 — SIGNIFICANT CHANGE UP (ref 1.01–1.02)
UROBILINOGEN FLD QL: 1 MG/DL
WBC UR QL: NEGATIVE /HPF — SIGNIFICANT CHANGE UP (ref 0–5)

## 2023-03-08 PROCEDURE — 99239 HOSP IP/OBS DSCHRG MGMT >30: CPT

## 2023-03-08 PROCEDURE — 99285 EMERGENCY DEPT VISIT HI MDM: CPT

## 2023-03-08 PROCEDURE — 80307 DRUG TEST PRSMV CHEM ANLYZR: CPT

## 2023-03-08 PROCEDURE — 80048 BASIC METABOLIC PNL TOTAL CA: CPT

## 2023-03-08 PROCEDURE — G0378: CPT

## 2023-03-08 PROCEDURE — U0005: CPT

## 2023-03-08 PROCEDURE — 85025 COMPLETE CBC W/AUTO DIFF WBC: CPT

## 2023-03-08 PROCEDURE — 81001 URINALYSIS AUTO W/SCOPE: CPT

## 2023-03-08 PROCEDURE — U0003: CPT

## 2023-03-08 PROCEDURE — 93005 ELECTROCARDIOGRAM TRACING: CPT

## 2023-03-08 PROCEDURE — 36415 COLL VENOUS BLD VENIPUNCTURE: CPT

## 2023-03-08 RX ADMIN — Medication 50 MILLIGRAM(S): at 11:53

## 2023-03-08 RX ADMIN — Medication 50 MILLIGRAM(S): at 00:52

## 2023-03-08 NOTE — ED BEHAVIORAL HEALTH ASSESSMENT NOTE - ADDITIONAL DETAILS ALL
eMERGENCY dEPARTMENT eNCOUnter   Independent Attestation     Pt Name: Moo Harper  MRN: [id number]  [place] Dylan Montes DO  Attending Emergency Physician                 Dylan Montes DO  12/09/22 [id number] See HPI

## 2023-03-08 NOTE — ED BEHAVIORAL HEALTH ASSESSMENT NOTE - NSBHATTESTAPPBILLTIME_PSY_A_CORE
I attest my time as ALY is greater than 50% of the total combined time spent on qualifying patient care activities. I have reviewed and verified the documentation.

## 2023-03-08 NOTE — ED BEHAVIORAL HEALTH ASSESSMENT NOTE - RISK ASSESSMENT
PF: job seeking, help seeking.  RF: alcohol use disorder, no social support, unemployment. PF: future oriented, seeking employment, fear of dying, domiciled  RF: alcohol use disorder, limited social support, unemployment.

## 2023-03-08 NOTE — ED BEHAVIORAL HEALTH ASSESSMENT NOTE - SUMMARY
Patient is a 47 year old, male; domiciled with group home; single; noncaregiver; unemployed; past psychiatric history of alcohol abuse; no psychiatric  hospitalizations; no known suicide attempts; no known history of violence or arrests; no active substance abuse or known history of complicated withdrawal; no significant PMH; brought in by EMS; called by [employer]; presenting with suicidal ideation; in the setting of auditory hallucination secondary to alcohol intoxication. Patient have a long history of alcohol abuse and endorses having heavy drinking last night and mixing medication with alcohol. Patient experienced AVH with a voice telling him to kill himself and people with red eyes staring at him whose soon disappeared. Patient has no suicidal ideation or plan in today's interview. Patient is not at risk to hurt self or others and would like to be discharged with social support services.

## 2023-03-08 NOTE — ED ADULT NURSE REASSESSMENT NOTE - NS ED NURSE REASSESS COMMENT FT1
Patient on monitoring for alcohol withdraw, CIWA score remain the same, 14 patient is calm in bed with visible tremor. no acute distress is seen, nursing to continue to monitor.,
Pt ambulate to void c/o H/A Motrin PO admin . calm and cooperative 1:1 monitor continue.
Received Librium for symptoms of alcohol withdraw, tolerated well, continue to shake, patient in bed awake, nursing to monitor.
Patient was assessed this morning by psychiatric practitioner, reported feeling better and requesting discharge. Pt states he drank too much, and doesn't recall what he said while he was intoxicated. Reports he has no suicidal ideation, or thoughts to harm self/others. Denied a/v hallucinations. Cleared to be discharged. Declined any services for follow up services. No distress at time of discharge. Provided d/c papers and bus tickets for transportation.

## 2023-03-08 NOTE — ED BEHAVIORAL HEALTH ASSESSMENT NOTE - DESCRIPTION
Unemployed ICU Vital Signs Last 24 Hrs  T(C): 36.6 (08 Mar 2023 08:00), Max: 37.1 (07 Mar 2023 15:46)  T(F): 97.8 (08 Mar 2023 08:00), Max: 98.7 (07 Mar 2023 15:46)  HR: 77 (08 Mar 2023 08:00) (77 - 101)  BP: 126/77 (08 Mar 2023 08:00) (124/63 - 151/768)  BP(mean): --  ABP: --  ABP(mean): --  RR: 18 (08 Mar 2023 08:00) (17 - 18)  SpO2: 99% (08 Mar 2023 08:00) (93% - 99%)    O2 Parameters below as of 08 Mar 2023 08:00  Patient On (Oxygen Delivery Method): room air Alcohol abuse Pt calm, cooperative. No stat meds administered while in the ED.     ICU Vital Signs Last 24 Hrs  T(C): 36.6 (08 Mar 2023 08:00), Max: 37.1 (07 Mar 2023 15:46)  T(F): 97.8 (08 Mar 2023 08:00), Max: 98.7 (07 Mar 2023 15:46)  HR: 77 (08 Mar 2023 08:00) (77 - 101)  BP: 126/77 (08 Mar 2023 08:00) (124/63 - 151/768)  BP(mean): --  ABP: --  ABP(mean): --  RR: 18 (08 Mar 2023 08:00) (17 - 18)  SpO2: 99% (08 Mar 2023 08:00) (93% - 99%)    O2 Parameters below as of 08 Mar 2023 08:00  Patient On (Oxygen Delivery Method): room air

## 2023-03-08 NOTE — ED CDU PROVIDER DISPOSITION NOTE - PATIENT PORTAL LINK FT
You can access the FollowMyHealth Patient Portal offered by [facility] by registering at the following website: [email or web address]. By joining [employer]’s FollowMyHealth portal, you will also be able to view your health information using other applications (apps) compatible with our system.

## 2023-03-08 NOTE — CHART NOTE - NSCHARTNOTEFT_GEN_A_CORE
Note: Notified by  provider that the pt has been cleared for discharge. Met with pt. Pts secondary language is english and he stated he did not need an . Pt came from [facility] 27 years ago.  Lives alone in a rented room. Most family lives in [facility]. Pt stated he has along hx of ETOH use. Past tx with AA and talked about how he stayed sober and had a job and a good relationship with his GF for over 3 years. Pt stated he started drinking again after the breakup with his GF.  Pt declined referrals from  for substance abuse counseling. Provided support to trying AA again. pt has knowledge of where they hold meetings. Provided pt with info on the [facility] substance abuse hotline and their outreach services. Pt requested a bus pass to get home.

## 2023-03-08 NOTE — ED BEHAVIORAL HEALTH ASSESSMENT NOTE - HPI (INCLUDE ILLNESS QUALITY, SEVERITY, DURATION, TIMING, CONTEXT, MODIFYING FACTORS, ASSOCIATED SIGNS AND SYMPTOMS)
Patient is a 47 year old, male; domiciled with group home; single; noncaregiver; unemployed; past psychiatric history of alcohol abuse; no psychiatric  hospitalizations; no known suicide attempts; no known history of violence or arrests; no active substance abuse or known history of complicated withdrawal; no significant PMH; brought in by EMS; called by 7-; presenting with suicidal ideation; in the setting of auditory hallucination secondary to alcohol intoxication.     Patient is calm, organized, with linear thoughts. Patient reports yesterday he woke up and starting drinking beer which endorses drinking throughout the day. Patient notes last night he had trouble falling a sleep so he took melatonin, alcohol, and "antibiotics" (which weren't prescribed by medical provider and bought in [facility]). Patient states the "antibiotics" he got at the [facility] was penicillin from [place]. Patient reports he was taking the "antibiotics" because he had a fall injury a week ago which hit his head and end up having 7 sutures in his scalp. Patient notes after taking the medication he went to [facility] to buy something. Patient states shortly after he arrived at [facility], he started felling pain in the head and started having AVH. Patient reports having a voice telling him to kill himself and people with red eyes staring at him which soon disappeared. Patient states he asked a - employee to call ambulance. Patient states having similar episode of AVH 3-4 days approximatively 2 years ago and taking a "unknown drug" from a "dealer." Patient denies suicidal ideation, homicidal ideation today. Patient denies NSSIB, AVH today. Patient denies anxiety or depressions. Patient reports history of alcohol abuse since 2001 and stating he drink 4-5 drink a day. Patient denies any substance abuse such as cocaine, heroin, or marijuana. Unable to obtain collateral or emergency contact from patient. Patient is a 47 year old, male; domiciled with group home; single; noncaregiver; unemployed; past psychiatric history of alcohol abuse; no psychiatric  hospitalizations; no known suicide attempts; no known history of violence or arrests; no active substance abuse or known history of complicated withdrawal; no significant PMH; brought in by EMS; called by [employer]; presenting with suicidal ideation; in the setting of auditory hallucination secondary to alcohol intoxication.     Upon assessment this morning, patient is calm, organized, with linear thoughts. Patient reports yesterday he woke up and started drinking and continued to do so throughout the day. Patient notes last night he had trouble falling asleep so he took melatonin, drank alcohol, and "antibiotics" (which weren't prescribed by medical provider and claims were bought in a deli). Patient states the "antibiotics" he got at the Deli was penicillin from [facility]. Patient reports he was taking the "antibiotics" because he had a head injury from a fall one week ago and ended up having to have 7 sutures placed in his scalp. Patient notes after taking the medication he went to [employer] and shortly after, he started felling head pain and began experiences AVH. Patient reports hearing a voice telling him to kill himself and people with red eyes staring at him which soon disappeared. Patient states he asked a [employer] employee to call ambulance. Patient states having similar episode of AVH x 3-4 days approximatively 2 years ago and taking an "unknown drug" from a "dealer." Patient denies suicidal ideation, homicidal ideation today. Patient denies AVH today and has no hx of NSSIB. Patient denies anxiety or depression. Patient reports history of alcohol abuse since 2001 and stating he drink 4-5 drinks/day. Patient denies any other substance use. Pt is unable to provide collateral or an emergency contact.

## 2023-03-08 NOTE — ED BEHAVIORAL HEALTH ASSESSMENT NOTE - NSSUICPROTFACT_PSY_ALL_CORE
Identifies reasons for living/Other Identifies reasons for living/Fear of death or the actual act of killing self/Other

## 2023-03-08 NOTE — ED CDU PROVIDER SUBSEQUENT DAY NOTE - HISTORY
Pending psych eval. Notified to have withdrawal symptoms with documented CIWA of 17; however well appearing with stable VS. Will give librium and reassess. Pending psych eval. Notified to have withdrawal symptoms with documented CIWA of 14; however well appearing with stable VS. Will give librium and reassess.

## 2023-03-08 NOTE — ED CDU PROVIDER DISPOSITION NOTE - CLINICAL COURSE
pt with history of alcohol intoxication with depression; pt cleared by psychiatry after sobriety; dc with close follow up with pcp

## 2023-03-08 NOTE — ED CDU PROVIDER SUBSEQUENT DAY NOTE - CLINICAL SUMMARY MEDICAL DECISION MAKING FREE TEXT BOX
46 y/o male from group home with alcohol intoxication and making suicidal statements. healing old lac wound with some staples removed but still healing so 2 left in. Pending psych eval. Treated for withdrawal symptoms with Librium; will monitor on WA protocol.

## 2023-03-16 ENCOUNTER — EMERGENCY (EMERGENCY)
Facility: HOSPITAL | Age: 48
LOS: 1 days | Discharge: HOME | End: 2023-03-16
Attending: EMERGENCY MEDICINE
Payer: MEDICAID

## 2023-03-16 VITALS
TEMPERATURE: 98 F | WEIGHT: 130.07 LBS | DIASTOLIC BLOOD PRESSURE: 85 MMHG | OXYGEN SATURATION: 94 % | RESPIRATION RATE: 16 BRPM | HEART RATE: 76 BPM | SYSTOLIC BLOOD PRESSURE: 124 MMHG

## 2023-03-16 VITALS
RESPIRATION RATE: 18 BRPM | DIASTOLIC BLOOD PRESSURE: 68 MMHG | OXYGEN SATURATION: 98 % | SYSTOLIC BLOOD PRESSURE: 121 MMHG | TEMPERATURE: 98 F | HEART RATE: 68 BPM

## 2023-03-16 PROCEDURE — 70450 CT HEAD/BRAIN W/O DYE: CPT | Mod: 26,MA

## 2023-03-16 PROCEDURE — 99285 EMERGENCY DEPT VISIT HI MDM: CPT

## 2023-03-16 PROCEDURE — 82962 GLUCOSE BLOOD TEST: CPT

## 2023-03-16 PROCEDURE — 99284 EMERGENCY DEPT VISIT MOD MDM: CPT

## 2023-03-16 PROCEDURE — 70450 CT HEAD/BRAIN W/O DYE: CPT | Mod: MA

## 2023-03-16 NOTE — ED PROVIDER NOTE - PROGRESS NOTE DETAILS
Claudia MCCORD: patient tried getting out of bed, fell hitting head on wall. No LOC/vomiting. CTH ordered. patient signed out pending sobreity, walking without difficulty. has transportation. [facility] -Mariela DO

## 2023-03-16 NOTE — ED ADULT NURSE REASSESSMENT NOTE - INTERVENTIONS DEFINITIONS
Instruct patient to call for assistance/Room bathroom lighting operational/Non-slip footwear when patient is off stretcher/Physically safe environment: no spills, clutter or unnecessary equipment/Stretcher in lowest position, wheels locked, appropriate side rails in place/Provide visual cue, wrist band, yellow gown, etc./Monitor gait and stability/Monitor for mental status changes and reorient to person, place, and time/Reinforce activity limits and safety measures with patient and family/Provide visual clues: red socks

## 2023-03-16 NOTE — ED ADULT NURSE REASSESSMENT NOTE - NS ED NURSE REASSESS COMMENT FT1
Unwitnessed fall from stretcher in hallway. Pt Oriented to surrounding and assisted to bed. ED MD informed

## 2023-03-16 NOTE — ED PROVIDER NOTE - PROGRESS NOTE
"Chief Complaint   Patient presents with     Well Child       Initial /65  Pulse 96  Temp 97.1  F (36.2  C) (Oral)  Ht 3' 4.5\" (1.029 m)  Wt 44 lb 6 oz (20.1 kg)  SpO2 99%  BMI 19.02 kg/m2 Estimated body mass index is 19.02 kg/(m^2) as calculated from the following:    Height as of this encounter: 3' 4.5\" (1.029 m).    Weight as of this encounter: 44 lb 6 oz (20.1 kg).  Medication Reconciliation: complete  Rachel Brambila M.A.    "
Improved.

## 2023-03-16 NOTE — ED ADULT NURSE NOTE - PRO INTERPRETER NEED 2
Observation Goals:    : -diagnostic tests and consults completed and resulted - met  -vital signs normal or at patient baseline - met  -tolerating oral intake to maintain hydration - met   -adequate pain control on oral analgesics - met  -returns to baseline functional status - not met  -safe disposition plan has been identified - not met   English

## 2023-03-16 NOTE — ED PROVIDER NOTE - PATIENT PORTAL LINK FT
You can access the FollowMyHealth Patient Portal offered by [facility] by registering at the following website: [email or web address]. By joining [employer]’s FollowMyHealth portal, you will also be able to view your health information using other applications (apps) compatible with our system.

## 2023-03-16 NOTE — ED ADULT NURSE NOTE - CHIEF COMPLAINT QUOTE
BIBEMS c/o ingestion of ETOH. Per EMS, patient found outside a [employer] next to empty bottles of liquor. Patient admits to drinking alcohol today. No evidence of trauma or bruising on exam, patient changed into a yellow gown w/ no belongings at bedside.

## 2023-03-16 NOTE — ED ADULT NURSE REASSESSMENT NOTE - NS ED NURSE REASSESS COMMENT FT1
assumed care from pt from previous RN DANIELLE. pt resting in stretcher at this time. CT of head preformed s/p fall results negative, safety maintained.

## 2023-03-16 NOTE — ED PROVIDER NOTE - OBJECTIVE STATEMENT
47-year male history of alcohol abuse presents with alcohol intoxication.  Admits to alcohol use.  Denies trauma, chest pain, shortness of breath, drug use or any additional complaints.

## 2023-03-18 ENCOUNTER — EMERGENCY (EMERGENCY)
Facility: HOSPITAL | Age: 48
LOS: 1 days | Discharge: HOME | End: 2023-03-18
Attending: STUDENT IN AN ORGANIZED HEALTH CARE EDUCATION/TRAINING PROGRAM
Payer: MEDICAID

## 2023-03-18 VITALS
RESPIRATION RATE: 16 BRPM | SYSTOLIC BLOOD PRESSURE: 135 MMHG | OXYGEN SATURATION: 96 % | TEMPERATURE: 98 F | WEIGHT: 160.06 LBS | DIASTOLIC BLOOD PRESSURE: 89 MMHG | HEART RATE: 100 BPM

## 2023-03-18 PROCEDURE — 99284 EMERGENCY DEPT VISIT MOD MDM: CPT

## 2023-03-18 NOTE — SBIRT NOTE ADULT - NSSBIRTALCPASSREFTXDET_GEN_A_CORE
Worker provided with resources for sliding scale treatment centers in [facility]. Pt shared that he has used AA in the past and had prolonged periods of sobriety as a result. Pt accepted resources.

## 2023-03-18 NOTE — ED PROVIDER NOTE - PHYSICAL EXAMINATION
Constitutional - well-developed.   Head - NCAT. Airway patent. R parietal scalp with healed laceration staples in place  Eyes - PERRL.   CV - RRR. no murmur. no edema.   Pulm - CTAB.   Abd - soft, nt. no rebound. no guarding.   Neuro - A&Ox3. strength 5/5 x4. sensation intact x4. normal gait. CN II-XII intact.  Skin - No rash. .  MSK - normal ROM.
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
## 2023-03-18 NOTE — ED PROVIDER NOTE - CLINICAL SUMMARY MEDICAL DECISION MAKING FREE TEXT BOX
Pt with alcohol abuse. Pt clinically sober.  Pt states that he is feeling better and wants to go home. old staples removed. will d/c with outpatient f/up and return instructions.

## 2023-03-18 NOTE — ED PROVIDER NOTE - PATIENT PORTAL LINK FT
You can access the FollowMyHealth Patient Portal offered by [facility] by registering at the following website: [email or web address]. By joining [employer]’s FollowMyHealth portal, you will also be able to view your health information using other applications (apps) compatible with our system.

## 2023-03-19 PROCEDURE — 99285 EMERGENCY DEPT VISIT HI MDM: CPT

## 2023-03-19 PROCEDURE — 82962 GLUCOSE BLOOD TEST: CPT

## 2023-03-20 NOTE — ED ADULT TRIAGE NOTE - AS TEMP SITE
oral [Rotation to left] : rotation to left [Rotation to right] : rotation to right [de-identified] : left lateral rotation 75 degrees [[id number]] : right lateral rotation 75 degrees [] : positive Zamora maneuver facet loading

## 2023-04-05 ENCOUNTER — EMERGENCY (EMERGENCY)
Facility: HOSPITAL | Age: 48
LOS: 1 days | Discharge: HOME | End: 2023-04-05
Attending: STUDENT IN AN ORGANIZED HEALTH CARE EDUCATION/TRAINING PROGRAM
Payer: MEDICAID

## 2023-04-05 VITALS
OXYGEN SATURATION: 97 % | HEART RATE: 75 BPM | TEMPERATURE: 98 F | SYSTOLIC BLOOD PRESSURE: 118 MMHG | DIASTOLIC BLOOD PRESSURE: 79 MMHG | RESPIRATION RATE: 18 BRPM

## 2023-04-05 VITALS
DIASTOLIC BLOOD PRESSURE: 66 MMHG | HEART RATE: 74 BPM | SYSTOLIC BLOOD PRESSURE: 105 MMHG | RESPIRATION RATE: 18 BRPM | OXYGEN SATURATION: 99 % | TEMPERATURE: 98 F

## 2023-04-05 LAB
ALBUMIN SERPL ELPH-MCNC: 4.7 G/DL — SIGNIFICANT CHANGE UP (ref 3.3–5.2)
ALP SERPL-CCNC: 211 U/L — HIGH (ref 40–120)
ALT FLD-CCNC: 91 U/L — HIGH
ANION GAP SERPL CALC-SCNC: 13 MMOL/L — SIGNIFICANT CHANGE UP (ref 5–17)
AST SERPL-CCNC: 153 U/L — HIGH
BASOPHILS # BLD AUTO: 0.08 K/UL — SIGNIFICANT CHANGE UP (ref 0–0.2)
BASOPHILS NFR BLD AUTO: 1.3 % — SIGNIFICANT CHANGE UP (ref 0–2)
BILIRUB SERPL-MCNC: 0.4 MG/DL — SIGNIFICANT CHANGE UP (ref 0.4–2)
BUN SERPL-MCNC: 3.1 MG/DL — LOW (ref 8–20)
CALCIUM SERPL-MCNC: 8.6 MG/DL — SIGNIFICANT CHANGE UP (ref 8.4–10.5)
CHLORIDE SERPL-SCNC: 103 MMOL/L — SIGNIFICANT CHANGE UP (ref 96–108)
CO2 SERPL-SCNC: 25 MMOL/L — SIGNIFICANT CHANGE UP (ref 22–29)
CREAT SERPL-MCNC: 0.4 MG/DL — LOW (ref 0.5–1.3)
EGFR: 135 ML/MIN/1.73M2 — SIGNIFICANT CHANGE UP
EOSINOPHIL # BLD AUTO: 0.17 K/UL — SIGNIFICANT CHANGE UP (ref 0–0.5)
EOSINOPHIL NFR BLD AUTO: 2.9 % — SIGNIFICANT CHANGE UP (ref 0–6)
GLUCOSE SERPL-MCNC: 99 MG/DL — SIGNIFICANT CHANGE UP (ref 70–99)
HCT VFR BLD CALC: 41.6 % — SIGNIFICANT CHANGE UP (ref 39–50)
HGB BLD-MCNC: 14.7 G/DL — SIGNIFICANT CHANGE UP (ref 13–17)
IMM GRANULOCYTES NFR BLD AUTO: 0.2 % — SIGNIFICANT CHANGE UP (ref 0–0.9)
LYMPHOCYTES # BLD AUTO: 2.05 K/UL — SIGNIFICANT CHANGE UP (ref 1–3.3)
LYMPHOCYTES # BLD AUTO: 34.6 % — SIGNIFICANT CHANGE UP (ref 13–44)
MCHC RBC-ENTMCNC: 34.8 PG — HIGH (ref 27–34)
MCHC RBC-ENTMCNC: 35.3 GM/DL — SIGNIFICANT CHANGE UP (ref 32–36)
MCV RBC AUTO: 98.6 FL — SIGNIFICANT CHANGE UP (ref 80–100)
MONOCYTES # BLD AUTO: 0.45 K/UL — SIGNIFICANT CHANGE UP (ref 0–0.9)
MONOCYTES NFR BLD AUTO: 7.6 % — SIGNIFICANT CHANGE UP (ref 2–14)
NEUTROPHILS # BLD AUTO: 3.17 K/UL — SIGNIFICANT CHANGE UP (ref 1.8–7.4)
NEUTROPHILS NFR BLD AUTO: 53.4 % — SIGNIFICANT CHANGE UP (ref 43–77)
PLATELET # BLD AUTO: 165 K/UL — SIGNIFICANT CHANGE UP (ref 150–400)
POTASSIUM SERPL-MCNC: 3.7 MMOL/L — SIGNIFICANT CHANGE UP (ref 3.5–5.3)
POTASSIUM SERPL-SCNC: 3.7 MMOL/L — SIGNIFICANT CHANGE UP (ref 3.5–5.3)
PROT SERPL-MCNC: 8.5 G/DL — SIGNIFICANT CHANGE UP (ref 6.6–8.7)
RBC # BLD: 4.22 M/UL — SIGNIFICANT CHANGE UP (ref 4.2–5.8)
RBC # FLD: 12.2 % — SIGNIFICANT CHANGE UP (ref 10.3–14.5)
SODIUM SERPL-SCNC: 140 MMOL/L — SIGNIFICANT CHANGE UP (ref 135–145)
TROPONIN T SERPL-MCNC: <0.01 NG/ML — SIGNIFICANT CHANGE UP (ref 0–0.06)
WBC # BLD: 5.93 K/UL — SIGNIFICANT CHANGE UP (ref 3.8–10.5)
WBC # FLD AUTO: 5.93 K/UL — SIGNIFICANT CHANGE UP (ref 3.8–10.5)

## 2023-04-05 PROCEDURE — 99285 EMERGENCY DEPT VISIT HI MDM: CPT

## 2023-04-05 PROCEDURE — 71046 X-RAY EXAM CHEST 2 VIEWS: CPT | Mod: 26

## 2023-04-05 PROCEDURE — 93010 ELECTROCARDIOGRAM REPORT: CPT

## 2023-04-05 NOTE — ED PROVIDER NOTE - PROGRESS NOTE DETAILS
Thania Longoria MD Attending Physician- patient feels improved, now clinically sober, speaking clearly, does not want to see psych, not an acute threat to self or others, tolerating PO, offered resources for alcohol cessation but not interested at this time, will give cardiology follow up Thania Longoria MD Attending Physician- patient feels improved, now clinically sober, speaking clearly, does not want to see psych, not an acute threat to self or others, tolerating PO, ambulating with steady gait, SW consulted for assistance with way home, offered resources for alcohol cessation but not interested at this time, will give cardiology follow up

## 2023-04-05 NOTE — ED PROVIDER NOTE - NSFOLLOWUPCLINICS_GEN_ALL_ED_FT
[facility] Cardiology  Cardiology  [address] [place] [address]  Phone: [phone]  Fax:   Follow Up Time: 7-10 Days

## 2023-04-05 NOTE — ED PROVIDER NOTE - PHYSICAL EXAMINATION
PHYSICAL EXAM:   General: nontoxic appearing but does appear intoxicated  HEENT: NC/AT, well healed prior laceration to R parietal scalp, airway patent  Cardiovascular: regular rate and rhythm, + S1/S2, no murmurs, rubs, gallops appreciated, L superior chest wall TTP, no overlying lesions  Respiratory: clear to auscultation bilaterally, good aeration bilaterally, nonlabored respirations  Abdominal: soft, nontender, nondistended, no rebound, guarding or rigidity  Extremities: no LE edema or calf tenderness b/l. Radial pulses equal and strong b/l  Neuro: awake and alert appears nonfocal  Psychiatric: no si/hi/ states he sees "devil" in his room  Skin: appropriate color, warm, dry.   -Thania Longoria MD Attending Physician

## 2023-04-05 NOTE — ED PROVIDER NOTE - PATIENT PORTAL LINK FT
You can access the FollowMyHealth Patient Portal offered by [facility] by registering at the following website: [email or web address]. By joining [employer]’s FollowMyHealth portal, you will also be able to view your health information using other applications (apps) compatible with our system.
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
## 2023-04-05 NOTE — ED ADULT NURSE NOTE - OBJECTIVE STATEMENT
Pt received intox, c/o left sided Cx pain, pt known hx of etoh abuse. No labored breathing, no s/s acute distress noted, EKG done upon arrival NSR, will cont' to monitor. Pt received intox, c/o left sided Cx pain, pt known hx of etoh abuse. No labored breathing, no s/s acute distress noted, EKG done upon arrival NSR, Pt in yellow gown. will cont' to monitor.
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
## 2023-04-05 NOTE — ED PROVIDER NOTE - NSFOLLOWUPINSTRUCTIONS_ED_ALL_ED_FT
1. You were seen in the Emergency Room for chest pain and alcohol intoxication  2. Please continue to take all medication as prescribed  3. Please follow up with your doctor in 24-48 hours. Please follow with a cardiologist (see information below) regarding your chest pain  4. Return to the emergency room or seek immediate assistance for any new or concerning symptoms (such as fevers, chills, thoughts of hurting yourself or others, seeing or hearing things that others or not, chest pain, or shortness of breath ), or if you get worse.   5. Copies of your tests were provided to you for follow-up.  You must address all your findings with your doctor.
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
## 2023-04-07 PROCEDURE — 36415 COLL VENOUS BLD VENIPUNCTURE: CPT

## 2023-04-07 PROCEDURE — 80053 COMPREHEN METABOLIC PANEL: CPT

## 2023-04-07 PROCEDURE — 99285 EMERGENCY DEPT VISIT HI MDM: CPT

## 2023-04-07 PROCEDURE — 82962 GLUCOSE BLOOD TEST: CPT

## 2023-04-07 PROCEDURE — 93005 ELECTROCARDIOGRAM TRACING: CPT

## 2023-04-07 PROCEDURE — 85025 COMPLETE CBC W/AUTO DIFF WBC: CPT

## 2023-04-07 PROCEDURE — 84484 ASSAY OF TROPONIN QUANT: CPT

## 2023-04-07 PROCEDURE — 71046 X-RAY EXAM CHEST 2 VIEWS: CPT

## 2023-04-10 ENCOUNTER — EMERGENCY (EMERGENCY)
Facility: HOSPITAL | Age: 48
LOS: 1 days | Discharge: HOME | End: 2023-04-10
Attending: EMERGENCY MEDICINE
Payer: MEDICAID

## 2023-04-10 VITALS
RESPIRATION RATE: 18 BRPM | SYSTOLIC BLOOD PRESSURE: 137 MMHG | DIASTOLIC BLOOD PRESSURE: 80 MMHG | HEART RATE: 82 BPM | OXYGEN SATURATION: 98 % | TEMPERATURE: 98 F

## 2023-04-10 PROCEDURE — 99284 EMERGENCY DEPT VISIT MOD MDM: CPT

## 2023-04-10 PROCEDURE — 93010 ELECTROCARDIOGRAM REPORT: CPT

## 2023-04-10 NOTE — ED ADULT TRIAGE NOTE - CHIEF COMPLAINT QUOTE
BINDU from home reporting 4-5 days of continuous drinking (beer) and anxiety, he "doesn't want to die in a basement." Intoxicated, reports that his mother is dying and he is very upset. Reports SOB and chest pain related to anxiety. EKG in progress.

## 2023-04-10 NOTE — ED PROVIDER NOTE - ATTENDING CONTRIBUTION TO CARE
Had a brief episode of chest pain and anxiety while intoxicated, now resolved, ECG normal, wants to take a cab home.

## 2023-04-10 NOTE — ED PROVIDER NOTE - OBJECTIVE STATEMENT
GALINA BRANDT is a 48yo Male with PMH etoh use disorder who presents intoxicated. PT denies any symptoms of cp/sob, fevers, chills, nausea, vomiting, abdominal pain, urinary symptoms, weakness, confusion. Denies any trauma.
15

## 2023-04-10 NOTE — ED PROVIDER NOTE - PHYSICAL EXAMINATION
VITAL SIGNS: I have reviewed vital signs  CONSTITUTIONAL:  in no acute distress. +SMELLS OF ALCHOL.  SKIN: Warm, dry, no rash.  HEAD: Normocephalic, atraumatic.   EYES: PERRL, conjunctiva and sclera clear.  ENT: pink & moist mucosa, no pharyngeal erythema  NECK: Supple, non tender. No cervical lymphadenopathy.  CARD: Regular rate and rhythm. No murmurs.   RESP: No wheezes, rales or rhonchi.   ABD:  soft, non-distended, non-tender.   MSK:  Good ROM in upper/lower extremities w/o pain.   NEURO: Alert, oriented. Grossly unremarkable. No focal deficits.   PSYCH: Cooperative, alert & oriented x3

## 2023-04-10 NOTE — ED PROVIDER NOTE - CLINICAL SUMMARY MEDICAL DECISION MAKING FREE TEXT BOX
ASSESSMENT:   GALINA BRANDT is a 48yo M who presented intoxicated. No medical complaints.     PLAN: Pt requesting to leave. ASSESSMENT:   GALINA BRANDT is a 48yo M who presented intoxicated. No medical complaints.     PLAN: Pt clinically sober and ambulating w/ coordinated gait around the ED. Pt requesting to leave. Medicaid cab arranged.

## 2023-04-10 NOTE — ED PROVIDER NOTE - NS ED ROS FT
Review of Systems  CONSTITUTIONAL: afebrile w/no diaphoresis or weight changes  SKIN: warm, dry w/ no rash or bleeding  EYES: no changes to vision  ENT: no changes in hearing, no sore throat  RESPIRATORY: no cough or SOB  CARDIAC: no chest pain & no palpitations  GI: no abd pain, nausea, vomiting, diarrhea, constipation, or blood in stool/sera blood  GENITO-URINARY: no discharge, dysuria or hematuria,   MUSCULOSKELETAL:  no joint pain, swelling or redness  NEUROLOGIC: no weakness, headache, anesthesia or paresthesias  PSYCH: no anxiety, non suicidal, non homicidal, without hallucinations or depression

## 2023-04-10 NOTE — ED PROVIDER NOTE - PATIENT PORTAL LINK FT
You can access the FollowMyHealth Patient Portal offered by [facility] by registering at the following website: [email or web address]. By joining [employer]’s FollowMyHealth portal, you will also be able to view your health information using other applications (apps) compatible with our system.

## 2023-04-11 PROCEDURE — 99285 EMERGENCY DEPT VISIT HI MDM: CPT

## 2023-04-11 PROCEDURE — 93005 ELECTROCARDIOGRAM TRACING: CPT

## 2023-04-11 NOTE — ED ADULT NURSE NOTE - CAS ELECT INFOMATION PROVIDED
Patient discharged by provider. No signs of acute distress noted, respirations even and unlabored. Refer to provider notes./DC instructions

## 2023-04-11 NOTE — ED ADULT NURSE NOTE - OBJECTIVE STATEMENT
Patient discharged by provider. No signs of acute distress noted, respirations even and unlabored. Refer to provider notes.

## 2023-04-11 NOTE — ED ADULT NURSE REASSESSMENT NOTE - NS ED NURSE REASSESS COMMENT FT1
Patient discharged by provider prior to RN assessment. No signs of acute distress noted, respirations even and unlabored. Refer to provider notes.
Patient refused to give up phone and jacket. All other belongings secured. EKG completed and given to MD, patient called EMS and now asking to leave.

## 2023-04-12 ENCOUNTER — EMERGENCY (EMERGENCY)
Facility: HOSPITAL | Age: 48
LOS: 1 days | Discharge: HOME | End: 2023-04-12
Attending: EMERGENCY MEDICINE
Payer: MEDICAID

## 2023-04-12 VITALS
DIASTOLIC BLOOD PRESSURE: 83 MMHG | RESPIRATION RATE: 16 BRPM | SYSTOLIC BLOOD PRESSURE: 135 MMHG | OXYGEN SATURATION: 96 % | HEART RATE: 87 BPM | TEMPERATURE: 98 F

## 2023-04-12 PROCEDURE — 99282 EMERGENCY DEPT VISIT SF MDM: CPT

## 2023-04-12 PROCEDURE — 99283 EMERGENCY DEPT VISIT LOW MDM: CPT

## 2023-04-12 NOTE — ED PROVIDER NOTE - PATIENT PORTAL LINK FT
You can access the FollowMyHealth Patient Portal offered by [facility] by registering at the following website: [email or web address]. By joining [employer]’s FollowMyHealth portal, you will also be able to view your health information using other applications (apps) compatible with our system.

## 2023-04-12 NOTE — ED PROVIDER NOTE - OBJECTIVE STATEMENT
47y Male with history of alcohol abuse BIBEMS endorsing to alcohol ingestion all day. Denies any complaints. Denies fevers, chills, headache, chest pain, palpitations, shortness of breath, cough, nausea, vomiting, diarrhea, hematuria, dysuria, dark stools, focal neurologic symptoms. Patient requesting to leave.

## 2023-04-12 NOTE — ED ADULT TRIAGE NOTE - CHIEF COMPLAINT QUOTE
Pt. BIBA for ETOH. Pt. states he drank  a lot today, complaining of headache. Pt. changed into yellow gown and belongings secured. Ambulatory with steady gait in triage.

## 2023-04-12 NOTE — ED PROVIDER NOTE - CLINICAL SUMMARY MEDICAL DECISION MAKING FREE TEXT BOX
47y Male endorsing to alcohol ingestion today with no complaints in the setting of daily alcohol ingestion. Non tremulus, ambulating without difficulty. Hemodynamically stable, neurovascularly intact. Will storm.

## 2023-04-25 ENCOUNTER — EMERGENCY (EMERGENCY)
Facility: HOSPITAL | Age: 48
LOS: 1 days | Discharge: HOME | End: 2023-04-25
Attending: STUDENT IN AN ORGANIZED HEALTH CARE EDUCATION/TRAINING PROGRAM
Payer: MEDICAID

## 2023-04-25 VITALS
HEART RATE: 82 BPM | OXYGEN SATURATION: 97 % | DIASTOLIC BLOOD PRESSURE: 85 MMHG | RESPIRATION RATE: 17 BRPM | TEMPERATURE: 97 F | SYSTOLIC BLOOD PRESSURE: 130 MMHG

## 2023-04-25 PROCEDURE — 99284 EMERGENCY DEPT VISIT MOD MDM: CPT

## 2023-04-25 PROCEDURE — 99282 EMERGENCY DEPT VISIT SF MDM: CPT

## 2023-04-25 PROCEDURE — 99283 EMERGENCY DEPT VISIT LOW MDM: CPT | Mod: 25
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
## 2023-04-25 NOTE — ED PROVIDER NOTE - NSFOLLOWUPINSTRUCTIONS_ED_ALL_ED_FT
Palpitations    Palpitations are feelings that your heartbeat is irregular or is faster than normal. It may feel like your heart is fluttering or skipping a beat. Palpitations are usually not a serious problem. They may be caused by many things, including smoking, caffeine, alcohol, stress, and certain medicines or drugs. Most causes of palpitations are not serious. However, some palpitations can be a sign of a serious problem. You may need further tests to rule out serious medical problems.    Follow these instructions at home:      Pay attention to any changes in your condition. Take these actions to help manage your symptoms:        Eating and drinking    Avoid foods and drinks that may cause palpitations. These may include:    Caffeinated coffee, tea, soft drinks, diet pills, and energy drinks.  Chocolate.  Alcohol.        Lifestyle    Take steps to reduce your stress and anxiety. Things that can help you relax include:    Yoga.  Mind-body activities, such as deep breathing, meditation, or using words and images to create positive thoughts (guided imagery).  Physical activity, such as swimming, jogging, or walking. Tell your health care provider if your palpitations increase with activity. If you have chest pain or shortness of breath with activity, do not continue the activity until you are seen by your health care provider.  Biofeedback. This is a method that helps you learn to use your mind to control things in your body, such as your heartbeat.  Do not use drugs, including cocaine or ecstasy. Do not use marijuana.  Get plenty of rest and sleep. Keep a regular bed time.        General instructions    Take over-the-counter and prescription medicines only as told by your health care provider.  Do not use any products that contain nicotine or tobacco, such as cigarettes and e-cigarettes. If you need help quitting, ask your health care provider.  Keep all follow-up visits as told by your health care provider. This is important. These may include visits for further testing if palpitations do not go away or get worse.    Contact a health care provider if you:  Continue to have a fast or irregular heartbeat after 24 hours.  Notice that your palpitations occur more often.    Get help right away if you:  Have chest pain or shortness of breath.  Have a severe headache.  Feel dizzy or you faint.    Summary  Palpitations are feelings that your heartbeat is irregular or is faster than normal. It may feel like your heart is fluttering or skipping a beat.  Palpitations may be caused by many things, including smoking, caffeine, alcohol, stress, certain medicines, and drugs.  Although most causes of palpitations are not serious, some causes can be a sign of a serious medical problem.  Get help right away if you faint or have chest pain, shortness of breath, a severe headache, or dizziness.    ADDITIONAL NOTES AND INSTRUCTIONS    Please follow up with your Primary MD in 24-48 hr.  Seek immediate medical care for any new/worsening signs or symptoms.

## 2023-04-25 NOTE — ED PROVIDER NOTE - PATIENT PORTAL LINK FT
You can access the FollowMyHealth Patient Portal offered by [facility] by registering at the following website: [email or web address]. By joining [employer]’s FollowMyHealth portal, you will also be able to view your health information using other applications (apps) compatible with our system.

## 2023-04-25 NOTE — ED ADULT NURSE NOTE - OBJECTIVE STATEMENT
+ETOH at home, Patient states mother is dying and he has been having palpitations and sob x 2 days. Patient did not want to stay for work up. MD made aware and at bedside.
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
## 2023-04-27 ENCOUNTER — EMERGENCY (EMERGENCY)
Facility: HOSPITAL | Age: 48
LOS: 1 days | Discharge: HOME | End: 2023-04-27
Attending: EMERGENCY MEDICINE
Payer: MEDICAID

## 2023-04-27 VITALS
DIASTOLIC BLOOD PRESSURE: 82 MMHG | RESPIRATION RATE: 18 BRPM | HEART RATE: 83 BPM | OXYGEN SATURATION: 99 % | TEMPERATURE: 98 F | SYSTOLIC BLOOD PRESSURE: 134 MMHG

## 2023-04-27 LAB
ALBUMIN SERPL ELPH-MCNC: 4.8 G/DL — SIGNIFICANT CHANGE UP (ref 3.3–5.2)
ALP SERPL-CCNC: 219 U/L — HIGH (ref 40–120)
ALT FLD-CCNC: 50 U/L — HIGH
ANION GAP SERPL CALC-SCNC: 17 MMOL/L — SIGNIFICANT CHANGE UP (ref 5–17)
AST SERPL-CCNC: 89 U/L — HIGH
BASOPHILS # BLD AUTO: 0.07 K/UL — SIGNIFICANT CHANGE UP (ref 0–0.2)
BASOPHILS NFR BLD AUTO: 1 % — SIGNIFICANT CHANGE UP (ref 0–2)
BILIRUB SERPL-MCNC: 0.5 MG/DL — SIGNIFICANT CHANGE UP (ref 0.4–2)
BUN SERPL-MCNC: 6 MG/DL — LOW (ref 8–20)
CALCIUM SERPL-MCNC: 8.8 MG/DL — SIGNIFICANT CHANGE UP (ref 8.4–10.5)
CHLORIDE SERPL-SCNC: 99 MMOL/L — SIGNIFICANT CHANGE UP (ref 96–108)
CO2 SERPL-SCNC: 25 MMOL/L — SIGNIFICANT CHANGE UP (ref 22–29)
CREAT SERPL-MCNC: 0.48 MG/DL — LOW (ref 0.5–1.3)
EGFR: 128 ML/MIN/1.73M2 — SIGNIFICANT CHANGE UP
EOSINOPHIL # BLD AUTO: 0.21 K/UL — SIGNIFICANT CHANGE UP (ref 0–0.5)
EOSINOPHIL NFR BLD AUTO: 3.1 % — SIGNIFICANT CHANGE UP (ref 0–6)
GLUCOSE SERPL-MCNC: 100 MG/DL — HIGH (ref 70–99)
HCT VFR BLD CALC: 40.8 % — SIGNIFICANT CHANGE UP (ref 39–50)
HGB BLD-MCNC: 14.7 G/DL — SIGNIFICANT CHANGE UP (ref 13–17)
IMM GRANULOCYTES NFR BLD AUTO: 0.1 % — SIGNIFICANT CHANGE UP (ref 0–0.9)
LYMPHOCYTES # BLD AUTO: 1.73 K/UL — SIGNIFICANT CHANGE UP (ref 1–3.3)
LYMPHOCYTES # BLD AUTO: 25.2 % — SIGNIFICANT CHANGE UP (ref 13–44)
MCHC RBC-ENTMCNC: 34.9 PG — HIGH (ref 27–34)
MCHC RBC-ENTMCNC: 36 GM/DL — SIGNIFICANT CHANGE UP (ref 32–36)
MCV RBC AUTO: 96.9 FL — SIGNIFICANT CHANGE UP (ref 80–100)
MONOCYTES # BLD AUTO: 0.51 K/UL — SIGNIFICANT CHANGE UP (ref 0–0.9)
MONOCYTES NFR BLD AUTO: 7.4 % — SIGNIFICANT CHANGE UP (ref 2–14)
NEUTROPHILS # BLD AUTO: 4.34 K/UL — SIGNIFICANT CHANGE UP (ref 1.8–7.4)
NEUTROPHILS NFR BLD AUTO: 63.2 % — SIGNIFICANT CHANGE UP (ref 43–77)
PLATELET # BLD AUTO: 262 K/UL — SIGNIFICANT CHANGE UP (ref 150–400)
POTASSIUM SERPL-MCNC: 3.8 MMOL/L — SIGNIFICANT CHANGE UP (ref 3.5–5.3)
POTASSIUM SERPL-SCNC: 3.8 MMOL/L — SIGNIFICANT CHANGE UP (ref 3.5–5.3)
PROT SERPL-MCNC: 8.7 G/DL — SIGNIFICANT CHANGE UP (ref 6.6–8.7)
RBC # BLD: 4.21 M/UL — SIGNIFICANT CHANGE UP (ref 4.2–5.8)
RBC # FLD: 12.8 % — SIGNIFICANT CHANGE UP (ref 10.3–14.5)
SODIUM SERPL-SCNC: 141 MMOL/L — SIGNIFICANT CHANGE UP (ref 135–145)
TROPONIN T SERPL-MCNC: <0.01 NG/ML — SIGNIFICANT CHANGE UP (ref 0–0.06)
WBC # BLD: 6.87 K/UL — SIGNIFICANT CHANGE UP (ref 3.8–10.5)
WBC # FLD AUTO: 6.87 K/UL — SIGNIFICANT CHANGE UP (ref 3.8–10.5)

## 2023-04-27 PROCEDURE — 84484 ASSAY OF TROPONIN QUANT: CPT

## 2023-04-27 PROCEDURE — 85025 COMPLETE CBC W/AUTO DIFF WBC: CPT

## 2023-04-27 PROCEDURE — T1013: CPT

## 2023-04-27 PROCEDURE — 80053 COMPREHEN METABOLIC PANEL: CPT

## 2023-04-27 PROCEDURE — 93005 ELECTROCARDIOGRAM TRACING: CPT

## 2023-04-27 PROCEDURE — 71045 X-RAY EXAM CHEST 1 VIEW: CPT

## 2023-04-27 PROCEDURE — 99285 EMERGENCY DEPT VISIT HI MDM: CPT | Mod: 25

## 2023-04-27 PROCEDURE — 82962 GLUCOSE BLOOD TEST: CPT

## 2023-04-27 PROCEDURE — 71045 X-RAY EXAM CHEST 1 VIEW: CPT | Mod: 26

## 2023-04-27 PROCEDURE — 93010 ELECTROCARDIOGRAM REPORT: CPT

## 2023-04-27 PROCEDURE — 99285 EMERGENCY DEPT VISIT HI MDM: CPT

## 2023-04-27 PROCEDURE — 36415 COLL VENOUS BLD VENIPUNCTURE: CPT

## 2023-04-27 NOTE — ED PROVIDER NOTE - PROGRESS NOTE DETAILS
Patient with steady gait. Troponin negative. Patient given information to follow-up with cardiologist outpatient. Patient medically stable at this time for discharge. Strict return precautions given. Nayeli Ferrera MD PGY-2

## 2023-04-27 NOTE — ED PROVIDER NOTE - NSFOLLOWUPINSTRUCTIONS_ED_ALL_ED_FT
Dolor de pecho inespecífico      El dolor de pecho puede deberse a muchas afecciones diferentes. Siempre existe addison posibilidad de que el dolor esté relacionado con algo grave, zoya un infarto de miocardio o un coágulo sanguíneo en los pulmones. Hay diferentes afecciones que no son potencialmente mortales que pueden causar dolor de pecho. Si tiene dolor de pecho, es muy importante que se controle con el médico.     ¿Cuáles son las causas?  Entre las causas de esta afección se incluyen las siguientes:    Acidez estomacal.  Neumonía o bronquitis.  Ansiedad o estrés.  Inflamación de la lalitha que rodea el corazón (pericarditis) o los pulmones (pleuritis o pleuresía).  Un coágulo sanguíneo en el pulmón.  Pulmón colapsado (neumotórax). Puede aparecer de manera repentina por sí solo (neumotórax espontáneo) o debido a un traumatismo en el tórax.  Culebrilla (virus de la varicela zóster).  Infarto de miocardio.  Daño de los huesos, los músculos y los cartílagos que conforman la pared torácica. Dibble puede incluir lo siguiente:  Hematomas óseos debido a lesiones.  Distensiones musculares o de los cartílagos por tos frecuente o repetida, o por exceso de trabajo.  Fractura de addison o más costillas.  Dolor de cartílago debido a inflamación (costocondritis).    ¿Qué incrementa el riesgo?  Entre los factores de riesgo de esta afección se pueden incluir los siguientes:    Actividades que incrementan el riesgo de sufrir traumatismos o lesiones en el tórax.  Infecciones o enfermedades respiratorias que causan tos frecuente.  Afecciones o excesos en las comidas que pueden causar acidez estomacal.  Cardiopatías coronarias o antecedentes familiares de enfermedades cardíacas.  Afecciones o comportamientos de jenny que aumentan el riesgo de tener un coágulo sanguíneo.  Conrad tenido varicela (varicela zóster).    ¿Cuáles son los signos o los síntomas?  El dolor de pecho puede provocar las siguientes sensaciones:    Ardor u hormigueo en la superficie o en lo profundo del pecho.  Dolor opresivo, continuo o constrictivo.  Dolor vago o intenso que empeora al moverse, toser o inhalar profundamente.  Dolor que también se siente en la espalda, el catherine, el hombro o el brazo, o dolor que se extiende a cualquiera de estas zonas.    El dolor de pecho puede aparecer y desaparecer, o maximiliano puede ser lance.    ¿Cómo se diagnostica?  Quizás se necesiten análisis de laboratorio u otros estudios para encontrar la causa del dolor. El médico puede indicarle que se laura addison prueba llamada ECG (electrocardiograma). El electrocardiograma registra los patrones de los latidos cardíacos en el momento en que se realiza el estudio. También pueden hacerle otros estudios, por ejemplo:    Ecocardiograma transtorácico (ETT). En dejah estudio, se usan ondas sonoras para crear addison imagen de las estructuras cardíacas y evaluar cómo circula la sandra por el corazón.  Ecocardiografía transesofágica (ETE). Dejah es un estudio de diagnóstico por imágenes más avanzado mediante el cual se iglesia imágenes del interior del cuerpo. Le permite al médico chidi el corazón con mayor detalle.  Monitoreo cardíaco. Permite que el médico controle la frecuencia y el ritmo cardíacos en tiempo real.  Monitor Holter. Es un dispositivo portátil que registra los latidos del corazón y puede ayudar a diagnosticar los latidos cardíacos anómalos. Le permite al médico registrar la actividad cardíaca zehra varios días, si es necesario.  Pruebas de esfuerzo. Estas pueden realizarse zehra el ejercicio o mediante la administración de un medicamento que acelera los latidos del corazón.  Análisis de sandra.  Otros estudios de diagnóstico por imágenes.    ¿Cómo se trata?  El tratamiento depende de la causa del dolor de pecho. El tratamiento puede incluir lo siguiente:    Medicamentos. Estos pueden incluir, entre otros, los siguientes:  Inhibidores de la acidez estomacal.  Antiinflamatorios.  Analgésicos para las enfermedades inflamatorias.  Antibióticos, si hay addison infección.  Medicamentos para disolver los coágulos sanguíneos.  Medicamentos para tratar la enfermedad arterial coronaria (EAC).  Tratamiento complementario para las enfermedades que no requieren la shirley de medicamentos. Algunas opciones de dejah tipo de tratamiento incluyen las siguientes:  Hacer reposo.  Aplicar compresas frías o calientes en las zonas lesionadas.  Limitar las actividades hasta que disminuya el dolor.    Siga estas indicaciones en espino casa:  Medicamentos    Si le recetaron un antibiótico, tómelo zoya se lo haya indicado el médico. No deje de valentina el antibiótico aunque comience a sentirse mejor.  [place] los medicamentos de venta tatyana y los recetados solamente zoya se lo haya indicado el médico.    Estilo de leo    No consuma ningún producto que contenga nicotina o tabaco, zoya cigarrillos y cigarrillos electrónicos. Si necesita ayuda para dejar de fumar, consulte al médico.  No david alcohol.  Laura cambios en espino estilo de leo zoya se lo haya indicado el médico. Estos pueden incluir, entre otros, los siguientes:   Practicar actividad física con regularidad. Pida al médico que le sugiera algunas actividades que ovidio seguras para usted.  Consumir addison dieta cardiosaludable. Un nutricionista matriculado puede ayudarlo a hacer elecciones saludables.  Mantener un peso saludable.  Controlar la diabetes, si es necesario.  Disminuir el nivel de estrés; por ejemplo, con yoga o técnicas de relajación.    Instrucciones generales    Evite las actividades que le causen dolor de pecho.  Si la causa del dolor de pecho es la acidez estomacal, levante (eleve) la cabecera de la cama aproximadamente 6 pulgadas (15 cm) colocando bloques debajo de las patas. Usar más almohadas para dormir no es efectivo para aliviar la acidez estomacal porque solo modificaría la posición de la fiona.  Concurra a todas las visitas de seguimiento zoya se lo haya indicado el médico. Dibble es importante. Dibble incluye otros estudios si el dolor de pecho no desaparece.    Comuníquese con un médico si:  El dolor de pecho no desaparece.  Tiene addison erupción cutánea con ampollas en el pecho.  Tiene fiebre.  Tiene escalofríos.    Solicite ayuda de inmediato si:  El dolor en el pecho es más intenso.  Tiene tos que empeora o tose con sandra.  Siente un dolor intenso en el abdomen.  Siente debilidad intensa.  Se desmaya.  Tiene addison molestia repentina e inexplicable en el pecho.  Tiene molestias repentinas e inexplicables en los brazos, la espalda, el catherine o la mandíbula.  Le falta el aire en cualquier momento.  Comienza a sudar de manera repentina o la piel se le humedece.  Siente náuseas o vomita.  Se siente repentinamente mareado o se desmaya.  Siente que el corazón comienza a latir rápidamente o que se saltea latidos.    Estos síntomas pueden representar un problema grave que constituye addison emergencia. No espere hasta que los síntomas desaparezcan. Solicite atención médica de inmediato. Comuníquese con el servicio de emergencias de espino localidad (911 en los [place]). No conduzca por shahid propios medios hasta el hospital.    NOTAS ADICIONALES E INSTRUCCIONES    Por favor tenga seguimiento con espino doctor primario entre 2 webb.  Regrese a urgencias por cualquier preocupacion medica.

## 2023-04-27 NOTE — ED PROVIDER NOTE - CLINICAL SUMMARY MEDICAL DECISION MAKING FREE TEXT BOX
47yM with personal history of htn and ETOH abuse presenting with alcohol intoxication. EKG NSR without STEMI. Patient with 3 days of chest pain with associated dyspnea or exertional symptoms. No cardiac history. Will check basic labs, trop, and CXR. If unremarkable will give contact info for outpatient cardiologist. 47yM with personal history of htn and ETOH abuse presenting with alcohol intoxication. EKG NSR without STEMI. Patient with 3 days of chest pain with associated dyspnea or exertional symptoms. No cardiac history. Will check basic labs, trop, and CXR. If unremarkable will give contact info for outpatient cardiologist.    Patient with steady gait. Troponin negative. CXR unremarkable. Patient given information to follow-up with cardiologist outpatient. Patient medically stable at this time for discharge. Strict return precautions given.

## 2023-04-27 NOTE — ED PROVIDER NOTE - ATTENDING CONTRIBUTION TO CARE
46 yo M with hx of EtOH abuse seen in ED 24 hrs ago for same and also c/o CP after drinking 6 beers earlier this evening.  EKG sinus with no ischemic changes.  On exam awake and alert, ALOOB sleepy in NAD, PERRL, throat clear mm moist, neck supple, cor Reg, Lungs clear b/l, Abd soft, NT, Ext FROM, Neuro non-focal.  Will check labs, CE, observe and re-eval

## 2023-04-27 NOTE — ED PROVIDER NOTE - OBJECTIVE STATEMENT
47yM with personal history of htn and ETOH abuse presenting with alcohol intoxication. Patient reports drinking ~ 6 beers prior to arrival to ED. Brought in by ambulance complaining of chest pain. Denies dyspnea, nausea, syncope, denies leg swelling, or recent trauma. Denies cardiac history and does not see a cardiologist. Reports chest pain started 3 days ago. Pain not worse with exertion.

## 2023-04-27 NOTE — ED ADULT NURSE NOTE - OBJECTIVE STATEMENT
47 year old male presents to ED with c/o chest pain and admitting to drinking ETOH prior to arrival.  Per patient chest pain started 3 days ago.  (+) ETOH intoxication.  Seen and evaluated by provider, orders obtained and noted.  Blood specimens obtained and sent to lab.  No acute distress noted.  Yellow gown in place and belongings secured.  Offers no complaints.

## 2023-04-27 NOTE — ED PROVIDER NOTE - PHYSICAL EXAMINATION
Gen: no acute distress; intoxicated  Head: normocephalic, atraumatic  EENT: EOMI  Lung: no increased work of breathing, CTABL  CV: normal s1/s2, rrr, 2+ radial pulses b/l, no LE edema  Abd: soft, non-tender, non-distended, no rebound tenderness or guarding  MSK: no visible deformities, full range of motion in all 4 extremities  Neuro: A&Ox4; No focal neurologic deficits

## 2023-04-27 NOTE — ED ADULT NURSE NOTE - NSIMPLEMENTINTERV_GEN_ALL_ED
Implemented All Fall Risk Interventions:  [place] to call system. Call bell, personal items and telephone within reach. Instruct patient to call for assistance. Room bathroom lighting operational. Non-slip footwear when patient is off stretcher. Physically safe environment: no spills, clutter or unnecessary equipment. Stretcher in lowest position, wheels locked, appropriate side rails in place. Provide visual cue, wrist band, yellow gown, etc. Monitor gait and stability. Monitor for mental status changes and reorient to person, place, and time. Review medications for side effects contributing to fall risk. Reinforce activity limits and safety measures with patient and family.

## 2023-04-27 NOTE — ED ADULT TRIAGE NOTE - CHIEF COMPLAINT QUOTE
Pt BIBA ambulatory with strong and steady gait c/o chest pain; arrived etoh intoxicated. Reported last etoh intake 3hrs ago. Capillary glucose of 142 during triage. Changed pt to yellow gown. EKG in progress.

## 2023-04-27 NOTE — ED PROVIDER NOTE - CARE PROVIDER_API CALL
Jesus Bansal)  Cardiovascular Disease; Internal Medicine  [address] [place] [address]  Phone: [phone]  Fax: [phone]  Follow Up Time:

## 2023-05-02 ENCOUNTER — EMERGENCY (EMERGENCY)
Facility: HOSPITAL | Age: 48
LOS: 1 days | Discharge: HOME | End: 2023-05-02
Attending: STUDENT IN AN ORGANIZED HEALTH CARE EDUCATION/TRAINING PROGRAM
Payer: MEDICAID

## 2023-05-02 VITALS
SYSTOLIC BLOOD PRESSURE: 148 MMHG | RESPIRATION RATE: 18 BRPM | HEART RATE: 87 BPM | DIASTOLIC BLOOD PRESSURE: 88 MMHG | OXYGEN SATURATION: 99 % | TEMPERATURE: 98 F

## 2023-05-02 PROCEDURE — 99284 EMERGENCY DEPT VISIT MOD MDM: CPT

## 2023-05-02 NOTE — ED ADULT NURSE NOTE - NS TRANSFER PATIENT BELONGINGS
$9($5 & 4 $1) sneakers, shirt, pants, jacket, hat cell phone/Cell Phone/PDA (specify)/Jewelry/Money (specify)/Other belongings/Clothing

## 2023-05-02 NOTE — ED ADULT TRIAGE NOTE - CHIEF COMPLAINT QUOTE
patient BIBA from [facility], was found intoxicated in parking lot. states he drank 24 beers tonight. arrives with slurred speech, tearful in triage states "I am crying for my mom." patient changed into yellow gown and belongings secured for safety.

## 2023-05-03 VITALS
DIASTOLIC BLOOD PRESSURE: 60 MMHG | OXYGEN SATURATION: 100 % | SYSTOLIC BLOOD PRESSURE: 128 MMHG | HEART RATE: 85 BPM | RESPIRATION RATE: 20 BRPM | TEMPERATURE: 98 F

## 2023-05-03 PROBLEM — F10.10 ALCOHOL ABUSE, UNCOMPLICATED: Chronic | Status: ACTIVE | Noted: 2023-04-27

## 2023-05-03 PROBLEM — I10 ESSENTIAL (PRIMARY) HYPERTENSION: Chronic | Status: ACTIVE | Noted: 2023-04-27

## 2023-05-03 PROCEDURE — 99285 EMERGENCY DEPT VISIT HI MDM: CPT

## 2023-05-03 PROCEDURE — 96372 THER/PROPH/DIAG INJ SC/IM: CPT

## 2023-05-03 PROCEDURE — 82962 GLUCOSE BLOOD TEST: CPT

## 2023-05-03 RX ORDER — OLANZAPINE 15 MG/1
5 TABLET, FILM COATED ORAL ONCE
Refills: 0 | Status: COMPLETED | OUTPATIENT
Start: 2023-05-03 | End: 2023-05-03

## 2023-05-03 RX ADMIN — OLANZAPINE 5 MILLIGRAM(S): 15 TABLET, FILM COATED ORAL at 00:32

## 2023-05-03 NOTE — ED PROVIDER NOTE - NSFOLLOWUPINSTRUCTIONS_ED_ALL_ED_FT
Intoxicación alcohólica  Alcohol Intoxication    La intoxicación alcohólica ocurre cuando addison persona ya no piensa con claridad ni se desempeña maximiliano (experimenta un deterioro) después de consumir bebidas alcohólicas. La intoxicación alcohólica puede ocurrir tan solo con addison copa. El definición legal de la intoxicación alcohólica depende de la cantidad de alcohol en la sandra (concentración de alcohol en la sandra, KEREN). Addison KEREN de 80 a 100 mg/dl o mayor se considera legalmente zoya addison intoxicación alcohólica. El nivel de deterioro depende de lo siguiente:    La cantidad de alcohol que la persona bebió.  La edad, el sexo y el peso de la persona.  La frecuencia con la que la persona emily.  Si la persona tiene otras enfermedades, tales zoya diabetes, convulsiones o addison afección cardíaca.    La intoxicación alcohólica puede variar de leve a grave. La afección puede ser peligrosa, especialmente si la persona:    También usa ciertas drogas ilegales y medicamentos recetados.  Emily addison gran cantidad de alcohol en un periodo corto de tiempo (consume alcohol compulsivamente).    En las mujeres, el consumo de alcohol compulsivo significa beber cuatro o más medidas de alcohol a la vez.  En los hombres, el consumo de alcohol compulsivo significa beber stephenie o más medidas de alcohol a la vez.    Si usted o alguien a espino alrededor parece estar embriagado, diga algo y actúe.    ¿Cuáles son las causas?  La causa de esta afección es el consumo de alcohol.    ¿Qué incrementa el riesgo?  Los siguientes factores pueden hacer que sea más propenso a contraer esta afección:    Presión de los pares en los adultos jóvenes.  Dificultad para manejar el estrés.  Antecedentes de consumo excesivo de drogas o alcohol.  Combinación de alcohol con drogas.  Antecedentes familiares de consumo excesivo de alcohol o drogas.  Peso corporal bajo.  Consumo de alcohol compulsivo.    ¿Cuáles son los signos o síntomas?  Los síntomas de la intoxicación alcohólica pueden variar de addison persona a otra. Los síntomas pueden ser leves, moderados o graves.    Los síntomas de addison intoxicación alcohólica leve pueden incluir los siguientes:    Sensación de relajación o somnolencia.  Tener addison leve dificultad con la coordinación, el habla, la memoria o la atención.    Los síntomas de addison intoxicación alcohólica moderada pueden incluir los siguientes:    Emociones extremas, zoya enojo o tristeza.  Tener dificultad con la coordinación, el habla, la memoria o la atención.    Los síntomas de addison intoxicación alcohólica grave pueden incluir los siguientes:    Tener addison seria dificultad con la coordinación, el habla, la memoria o la atención.  Desmayo.  Vómitos.  Confusión.  Respiración lenta.  Coma.    La intoxicación alcohólica puede cambiar rápidamente de leve a grave. Puede causar coma o la muerte, especialmente en las personas que no están expuestas al alcohol con frecuencia.    ¿Cómo se diagnostica?  El médico le preguntará la cantidad y el tipo de bebida alcohólica que bebió. La intoxicación también puede diagnosticarse en función de:    Los síntomas y los antecedentes médicos.  Un examen físico.  Un análisis de sandra que mide la KEREN.  El olor a alcohol en la respiración.    ¿Cómo se trata?  El tratamiento para la intoxicación por alcohol puede incluir:    Ser controlado en un departamento de emergencias, hospital o centro de tratamiento hasta que la KEREN disminuya y sea seguro para usted regresar a espino casa.  Líquidos intravenosos (i.v.) para prevenir o tratar la pérdida de líquido en el cuerpo (deshidratación).  Medicamentos para tratar las náuseas o los vómitos, o para eliminar el alcohol del cuerpo.  Asesoramiento (intervención breve) sobre los peligros de consumir alcohol.  Tratamiento para el trastorno por el consumo de sustancias.  Oxigenoterapia o addison máquina para respirar (respirador).    La exposición a addie plazo (crónica) al alcohol puede tener efectos a addie plazo en el cerebro, el corazón y el sistema digestivo. Estos efectos pueden ser graves y pueden requerir tratamiento.    Siga estas indicaciones en espino casa:         Comida y bebida     No pauline alcohol si:    Espino médico le indica no hacerlo.  Está embarazada, puede estar embarazada o está tratando de quedar embarazada.  No tiene la edad legal para beber (mayor de 21 años de edad en los [place]).  Está tomando medicamentos que no se deben valentina con alcohol.  Tiene addison afección médica y el alcohol la empeora.  Tiene que conducir o realizar actividades que requieren que esté alerta.  Tiene un trastorno por abuso de sustancias.  Pregúntele al médico si el alcohol es seguro para usted. Si el médico le permite beber alcohol, limite la cantidad que shirley. Puede beber:    De 0 a 1 medida por día para las mujeres.   De 0 a 2 medidas por día para los hombres.     Esté atento a la cantidad de alcohol que hay en las bebidas que shirley. En los [place], addison medida equivale a addison botella de cerveza de 12 oz (355 ml), un vaso de vino de 5 oz (148 ml) o un vaso de addison bebida alcohólica de daya graduación de 1½ oz (44 ml).  Evite beber alcohol con el estómago vacío.  Manténgase hidratado. Pauline suficiente líquido para mantener la orina de color amarillo pálido. Evite la cafeína, ya que puede deshidratarlo.  Evite consumir más de addison bebida alcohólica por hora.  Cuando pauline más que addison medida de alcohol, pauline agua o addison bebida sin alcohol entre las bebidas alcohólicas.        Indicaciones generales    [place] los medicamentos de venta tatyana y los recetados solamente zoya se lo haya indicado el médico.  No conduzca después de beber cualquier cantidad de alcohol. Organice un conductor designado u otra forma de volver a casa.  Pídale a alguna persona responsable que se quede con usted mientras está embriagado. No debería quedarse solo.  Concurra a todas las visitas de seguimiento zoya se lo haya indicado el médico. [place] es importante.    Comuníquese con un médico si:  No mejora luego de algunos días.  Tiene problemas en el trabajo, la escuela o el hogar debido al consumo de alcohol.    Solicite ayuda de inmediato si:  Tiene alguno de los siguientes síntomas:    Grave dificultad con la coordinación, el habla, la memoria o la atención.  Dificultad para mantenerse despierto.  Confusión grave.  Addison convulsión.  Desvanecimiento.  Desmayos.  Vómitos con sandra de color short brillante o addison sustancia parecida a la borra del café.  Tiene sandra en la materia fecal (heces). La sandra puede hacer que las heces tengan color short brillante, color marnie o aspecto alquitranado. También pueden tener mal olor.  Temblores al tratar de abandonar el hábito de beber.  Pensamientos acerca de lastimarse a sí mismo o a otras personas.    Si alguna vez siente que puede lastimarse o lastimar a otras personas, o tiene pensamientos de poner fin a espino leo, busque ayuda de inmediato. Puede dirigirse al servicio de emergencias más cercano o comunicarse con:    El servicio de emergencias de espino localidad (911 en [place]  Addison línea de asistencia al suicida y atención en crisis, zoya National Suicide Prevention Lifeline (Línea Nacional de Prevención del Suicidio), al 1-644.483.6036. Está disponible las 24 horas del día.    Resumen  La intoxicación alcohólica ocurre cuando addison persona ya no piensa con claridad ni se desempeña maxmiiliano después de consumir bebidas alcohólicas.  Si el médico le dice que no corre riesgos al beber alcohol, limite espino consumo a no más de 1 medida al día si es aristeo (no apuline si está embarazada) y 2 medidas si es hombre. Addison medida equivale a 12 onzas de cerveza, 5 onzas de vino o 1½ onzas de bebidas alcohólicas de daya graduación.  Comuníquese con el médico si el consumo de alcohol le ha causado problemas en el trabajo, en la escuela o en espino casa.  Busque ayuda de inmediato si tiene pensamientos acerca de lastimarse a usted mismo o a otras personas.

## 2023-05-03 NOTE — ED PROVIDER NOTE - CLINICAL SUMMARY MEDICAL DECISION MAKING FREE TEXT BOX
Patient with acute alcohol intoxication and unhappy about presence in the Ed. However, patient is an unsafe discharge at this time. Will observe for sobriety.

## 2023-05-03 NOTE — ED ADULT NURSE REASSESSMENT NOTE - NS ED NURSE REASSESS COMMENT FT1
Pt Aox4 not in distress, ambulatory with steady gait, discharge papers provided, bus ticket provided given to pt.

## 2023-05-03 NOTE — ED PROVIDER NOTE - PATIENT PORTAL LINK FT
You can access the FollowMyHealth Patient Portal offered by [facility] by registering at the following website: [email or web address]. By joining [facility]’s FollowMyHealth portal, you will also be able to view your health information using other applications (apps) compatible with our system.

## 2023-05-14 ENCOUNTER — EMERGENCY (EMERGENCY)
Facility: HOSPITAL | Age: 48
LOS: 1 days | Discharge: HOME | End: 2023-05-14
Attending: STUDENT IN AN ORGANIZED HEALTH CARE EDUCATION/TRAINING PROGRAM
Payer: MEDICAID

## 2023-05-14 VITALS
SYSTOLIC BLOOD PRESSURE: 115 MMHG | TEMPERATURE: 98 F | HEIGHT: 66 IN | OXYGEN SATURATION: 98 % | DIASTOLIC BLOOD PRESSURE: 70 MMHG | WEIGHT: 177.91 LBS | RESPIRATION RATE: 20 BRPM | HEART RATE: 87 BPM

## 2023-05-14 PROCEDURE — 99284 EMERGENCY DEPT VISIT MOD MDM: CPT

## 2023-05-14 PROCEDURE — 73564 X-RAY EXAM KNEE 4 OR MORE: CPT | Mod: 26,LT

## 2023-05-14 RX ORDER — IBUPROFEN 200 MG
600 TABLET ORAL ONCE
Refills: 0 | Status: COMPLETED | OUTPATIENT
Start: 2023-05-14 | End: 2023-05-14

## 2023-05-14 RX ADMIN — Medication 600 MILLIGRAM(S): at 20:50

## 2023-05-14 RX ADMIN — Medication 600 MILLIGRAM(S): at 19:28

## 2023-05-14 NOTE — ED PROVIDER NOTE - NSFOLLOWUPINSTRUCTIONS_ED_ALL_ED_FT
Dolor de rodilla crónico en los adultos  Chronic Knee Pain, Adult    El dolor de rodilla crónico es el dolor en addison o en ambas rodillas que dura más de 3 meses. Los síntomas de dolor de rodilla crónico pueden incluir hinchazón, rigidez y malestar. El desgaste de la articulación de la rodilla (osteoartritis) relacionado con la edad es la causa más frecuente de dolor de rodilla crónico. Otras causas posibles incluyen lo siguiente:    Addison enfermedad a addie plazo relacionada con el sistema inmunitario que causa inflamación de la rodilla (artritis reumatoide). Por lo general, afecta ambas rodillas.  Artritis inflamatoria, zoya gota o pseudogota.  Addison lesión en la rodilla que causa artritis.  Addison lesión en la rodilla que daña los ligamentos. Los ligamentos son tejidos armen que conectan los huesos entre sí.  Rodilla de cresencio o dolor detrás de la rótula.    El tratamiento para el dolor de rodilla crónico depende de espino causa. Los tratamientos principales para el dolor de rodilla crónico son la fisioterapia y la pérdida de peso. Esta afección también puede tratarse con medicamentos, inyecciones, addison rodillera o un dispositivo ortopédico, y el uso de muletas. Además, puede recomendarse el tratamiento con reposo, hielo, compresión (presión) y elevación (RHCE).    Siga estas instrucciones en espino casa:      Si tiene addison rodillera o un dispositivo ortopédico:     Úselos zoya se lo haya indicado el médico. Quíteselos solamente zoya se lo haya indicado el médico.  Aflójelos si siente hormigueo en los dedos de los pies, si se le entumecen, o se le enfrían y se tornan de color el.  Manténgalos limpios.  Si la rodillera o el dispositivo ortopédico no son impermeables:    No deje que se mojen.   Quíteselo, si el médico se lo permite, o cúbralo con un envoltorio hermético cuando tome un baño de inmersión o addison ducha.        Control del dolor, la rigidez y la hinchazón      Si se lo indican, aplique calor en la lalitha afectada con la frecuencia que le haya indicado el médico. Use la shaan de calor que el médico le recomiende, zoya addison compresa de calor húmedo o addison almohadilla térmica.    Si usa addison rodillera o un dispositivo ortopédico, quíteselo según las indicaciones del médico.  Colóquese addison toalla entre la piel y la shaan de calor.  Aplique calor zehra 20 a 30 minutos.  Retire la shaan de calor si la piel se pone de color short brillante. [place] es especialmente importante si no puede sentir dolor, calor o frío. Puede correr un riesgo mayor de sufrir quemaduras.  Si se lo indican, aplique hielo sobre la lalitha afectada.    Si usa addison rodillera o un dispositivo ortopédico, quíteselo según las indicaciones del médico.  Ponga el hielo en addison bolsa plástica.  Coloque addison toalla entre la piel y la bolsa.  Coloque el hielo zehra 20 minutos, 2 a 3 veces por día.  Mueva los dedos del pie con frecuencia para reducir la rigidez y la hinchazón.  Cuando esté sentado o acostado, levante (eleve) la lalitha lesionada por encima del nivel del corazón.        Actividad    Evite las actividades en las que ambos pies no estén en contacto con el piso al mismo tiempo (actividades de alto impacto). Algunos ejemplos son correr, saltar la soga y hacer jaison de tijera.  Retome migdalia actividades normales según lo indicado por el médico. Pregúntele al médico qué actividades son seguras para usted.  Siga el plan de ejercicios que el médico diseñó para usted. El médico puede recomendarle lo siguiente:    Evitar las actividades que empeoren el dolor de rodilla. [place] puede exigirle que modifique migdalia rutinas de ejercicio, participación en deportes u obligaciones laborales.  Usar calzado con suelas acolchonadas.  Evitar las actividades o los deportes de alto impacto que requieran correr y cambiar de dirección súbitamente.  Realice fisioterapia zoya se lo haya indicado el médico. La fisioterapia está planificada para satisfacer migdalia necesidades y capacidades. Puede incluir ejercicios para la fuerza, la flexibilidad, la estabilidad y la resistencia.  Laura ejercicios que mejoren el equilibrio y la fuerza, zoya el james chi y el yoga.  No apoye el peso del cuerpo sobre la extremidad lesionada hasta que lo autorice el médico. Use muletas, un bastón o un andador, según se lo haya indicado el médico.        Instrucciones generales    Use los medicamentos de venta tatyana y los recetados solamente zoya se lo haya indicado el médico.  Baje de peso si es necesario. Perder incluso un poco de peso puede reducir el dolor de rodilla. Pregúntele al médico cuál es espino peso ideal y cómo adelgazar sin riesgos. Un experto en alimentación (nutricionista) puede ayudarlo a planificar migdalia comidas.  No consuma ningún producto que contenga nicotina o tabaco, zoya cigarrillos, cigarrillos electrónicos y tabaco de mascar. Estos pueden retrasar la recuperación. Si necesita ayuda para dejar de fumar, consulte al médico.  Concurra a todas las visitas de seguimiento zoya se lo haya indicado el médico. [place] es importante.    Comuníquese con un médico si:  Tiene un dolor de rodilla que no mejora o que empeora.  No puede realizar los ejercicios de fisioterapia debido al dolor de rodilla.    Solicite ayuda inmediatamente si:  La rodilla se hincha y la hinchazón empeora.  No puede  la rodilla.  Siente dolor intenso en la rodilla.    Resumen  El dolor de rodilla que dura más de 3 meses se considera dolor de rodilla crónico.  Los tratamientos principales para el dolor de rodilla crónico son la fisioterapia y la pérdida de peso. También es posible que deba valentina medicamentos, usar addison rodillera o un dispositivo ortopédico, usar muletas y aplicarse hielo o calor en la rodilla.  Perder incluso un poco de peso puede reducir el dolor de rodilla. Pregúntele al médico cuál es espino peso ideal y cómo adelgazar sin riesgos. Un experto en alimentación (nutricionista) puede ayudarlo a planificar migdalia comidas.  Trabaje con un fisioterapeuta para crear un programa de ejercicios seguros, según lo indique el médico.  Trastorno por consumo de bebidas alcohólicas  Alcohol Use Disorder    El trastorno por consumo de bebidas alcohólicas es addison afección en la que el consumo de alcohol altera la leo cotidiana. Las personas con esta afección consumen bebidas alcohólicas en exceso y no pueden controlar espino consumo.    El trastorno por consumo de bebidas alcohólicas puede causar problemas graves en la lupe física. Puede afectar al cerebro, al corazón y a otros órganos internos. Dejah trastorno puede aumentar el riesgo de padecer ciertos tipos de cáncer y causar problemas de lupe mental, zoya depresión o ansiedad.    ¿Cuáles son las causas?  Esta afección se debe al consumo excesivo de alcohol con el transcurso del tiempo. Algunas personas que sufren esta afección beben para enfrentarse a situaciones negativas de la leo o escapar de ellas. Otros beben para aliviar el dolor o los síntomas de addison enfermedad mental.    ¿Qué incrementa el riesgo?  Es más probable que sufra esta afección si:    Tiene antecedentes familiares de trastorno por consumo de bebidas alcohólicas.  Espino cultura fomenta el consumo de bebidas alcohólicas hasta el punto de emborracharse (intoxicación alcohólica).  Tuvo un trastorno emocional o de conducta en la infancia.  Ha sufrido abusos.  Es adolescente y:    Tiene un desempeño deficiente en la escuela.  Recibe poca supervisión o guía.  Actúa de forma impulsiva y le gusta valentina riesgos.    ¿Cuáles son los signos o síntomas?  Los síntomas de esta afección incluyen:    Beber más de lo que desea.  Intentar beber menos en varias ocasiones, sin éxito.  Pasar mucho tiempo pensando en el alcohol, intentando conseguir alcohol, bebiendo o recuperándose de andrew bebido.  Continuar bebiendo incluso cuando esto le causa problemas graves en espino leo cotidiana.  Beber cuando es peligroso hacerlo, por ejemplo, antes de conducir.  Necesitar cada vez más alcohol para obtener el mismo efecto que busca (generar tolerancia).  Tener síntomas de abstinencia al dejar de beber. Entre los síntomas de abstinencia se incluyen los siguientes:    Dificultad para dormir, que produce cansancio (fatiga).  Cambios en el estado de ánimo, con depresión y ansiedad.  Síntomas físicos, zoya frecuencia cardíaca acelerada, respiración rápida, presión arterial elevada (hipertensión), fiebre, sudoración fría o náuseas.  Convulsiones.  Confusión grave.  Chet o sentir cosas que no existen (alucinaciones).  Temblores que no se pueden controlar.    ¿Cómo se diagnostica?  Dejah trastorno se diagnostica mediante addison evaluación. Para comenzar, el médico puede hacerle jose juan o cuatro preguntas sobre espino consumo de alcohol o entregarle addison prueba sencilla que deberá realizar. [place] ayudará a obtener información baltazar sobre usted.    Pueden hacerle un examen físico o análisis. Pueden derivarlo a un terapeuta especializado en abuso de sustancias.    ¿Cómo se trata?     Con la educación, algunas personas con trastorno por consumo de bebidas alcohólicas pueden reducir espino consumo. Muchas personas con dejah trastorno no son capaces de modificar espino comportamiento por espino cuenta y necesitan la ayuda de especialistas en abuso de sustancias. Estos especialistas son terapeutas que pueden ayudar a diagnosticar la gravedad de espino trastorno y a decidir qué tipo de tratamiento necesita. Los tratamientos pueden incluir lo siguiente:    Desintoxicación. La desintoxicación consiste en dejar de beber, con la supervisión y las instrucciones de los médicos. El médico puede recetarle medicamentos en la primera semana para ayudar a disminuir los síntomas de la abstinencia. La abstinencia puede ser peligrosa y potencialmente mortal. La desintoxicación puede realizarse en el hogar, en un ámbito extrahospitalario, en un centro de atención primaria, en un hospital o en un centro de tratamiento para abuso de sustancias.  Asesoramiento psicológico. Puede incluir entrevistas motivacionales (EM), terapia familiar o terapia cognitivo-conductual (TCC). Lo realizan terapeutas profesionales o especializados en el tratamiento de pacientes que abusan de sustancias. Un terapeuta puede abordar cómo cambiar espino comportamiento de consumo de bebidas alcohólicas y cómo mantener los cambios. La terapia tiene zoya objetivos:    Identificar migdalia motivaciones positivas para cambiar.  Identificar y evitar aquello que lo conduzca a beber alcohol.  Enseñarle a planificar espino cambio de comportamiento.  Proponer sistemas de apoyo que puedan ayudarlo a mantener el cambio.  Medicamentos. Los medicamentos pueden ayudar a tratar dejah trastorno de la siguiente manera:    Disminuyen el deseo intenso de consumir.  Reducen el sentimiento positivo que experimenta al beber alcohol.  Causan addison reacción física desagradable cuando emily alcohol (terapia de aversión).  Grupos de apoyo mutuo zoya Alcohólicos Anónimos (AA). Estos grupos son dirigidos por personas que carson superado espino problema con la bebida. Estos grupos brindan apoyo emocional, consejos y orientación.    Algunas personas con esta afección se benefician del tratamiento combinado que se ofrece en algunos centros de tratamiento especializados en el abuso de sustancias.    Siga estas instrucciones en espino casa:         Medicamentos    Use los medicamentos de venta tatyana y los recetados solamente zoya se lo haya indicado el médico.  Consulte antes de empezar a valentina cualquier medicamento, hierbas o suplementos nuevos.        Instrucciones generales    Pídales a migdalia amistades y familiares que apoyen espino decisión de mantenerse sobrio.  Evite situaciones en las que se sirva alcohol.  Daya un plan para lidiar con las situaciones tentadoras.  Asista a grupos de apoyo con regularidad.  Practique pasatiempos o actividades que le gusten.  No conduzca después de beber alcohol.  Concurra a todas las visitas de seguimiento zoya se lo haya indicado el médico. [place] es importante.    ¿Cómo se previene?  Si emily alcohol:    Limite la cantidad que emily:    De 0 a 1 medida por día para las mujeres que no estén embarazadas.  De 0 a 2 medidas por día para los hombres.  Esté atento a la cantidad de alcohol que hay en las bebidas que shirley. En los [place], addison medida equivale a addison botella de cerveza de 12 oz (355 ml), un vaso de vino de 5 oz (148 ml) o un vaso de addison bebida alcohólica de daya graduación de 1½ oz (44 ml).  Si tiene addison afección de lupe mental, busque tratamiento. Lleve un estilo de leo saludable, que puede incorporar lo siguiente:    Meditación o respiración profunda.  Realizar actividad física.  Pasar tiempo en contacto con la naturaleza.   Escuchar música.  Charlar con algún familiar o amigo de confianza.  Si eres adolescente:    No bebas alcohol. [place]wyn las reuniones en las que puedas tener la tentación de beber.  No tengas miedo de negarte si alguien te ofrece alcohol. Habla sin reservas acerca de por qué no quieres beber alcohol. Sé un ejemplo positivo para los demás al no consumir alcohol.  Construye relaciones con amigos que no beban.    Dónde buscar más información  Substance Abuse and Mental Health Services Administration (Administración de Servicios por Abuso de Sustancias y Lupe Mental): samhsa.gov  Alcohólicos Anónimos: aa.org    Comuníquese con un médico si:  No puede valentina los medicamentos zoya se lo carson indicado.  Migdalia síntomas empeoran o tiene síntomas de abstinencia cuando kendal de beber.  Vuelve a comenzar a beber (recaída) y los síntomas empeoran.    Solicite ayuda de inmediato si:  Tiene pensamientos acerca de lastimarse a usted mismo o a otras personas.    Si alguna vez siente que puede lastimarse o lastimar a otras personas, o tiene pensamientos de poner fin a espino leo, busque ayuda de inmediato. Diríjase al departamento de emergencias más cercano o:     Comuníquese con el servicio de emergencias de espino localidad (911 en los [place]).  Llame a addison línea de asistencia al suicida y atención en crisis zoya National Suicide Prevention Lifeline (Línea Nacional de Prevención del Suicidio) al 1-185.508.4105. Está disponible las 24 horas del día en los [place]  Envíe un mensaje de texto a la línea para casos de crisis al [phone] (en los [place]    Resumen  El trastorno por consumo de bebidas alcohólicas es addison afección en la que el consumo de alcohol altera la leo cotidiana. Las personas con esta afección consumen bebidas alcohólicas en exceso y no pueden controlar espino consumo.  El tratamiento puede incluir desintoxicación, asesoramiento psicológico, medicamentos y grupos de apoyo.  Pida ayuda a amigos y familiares. Evite situaciones en las que se sirva alcohol.  Busque ayuda de inmediato si tiene pensamientos acerca de lastimarse a usted mismo o a otras personas.    Please follow up with your Primary MD in 24-48 hr.  Seek immediate medical care for any new/worsening signs or symptoms.

## 2023-05-14 NOTE — ED ADULT NURSE NOTE - OBJECTIVE STATEMENT
pt presents to ed with L knee pain.  L knee wrapped with ace bandage, awaiting xray.  pt in yellow gown, no belongings at beside.  NAD at this time.

## 2023-05-14 NOTE — ED PROVIDER NOTE - PATIENT PORTAL LINK FT
You can access the FollowMyHealth Patient Portal offered by [facility] by registering at the following website: [email or web address]. By joining [employer]’s FollowMyHealth portal, you will also be able to view your health information using other applications (apps) compatible with our system.

## 2023-05-14 NOTE — ED ADULT NURSE NOTE - CHIEF COMPLAINT QUOTE
" I fell a while ago and my left leg hurts" pt denies falling today. admits to drinking alcohol picked up [place] bus stop, pt undressed and placed in yellow gown

## 2023-05-14 NOTE — ED ADULT NURSE NOTE - NSFALLUNIVINTERV_ED_ALL_ED
Bed/Stretcher in lowest position, wheels locked, appropriate side rails in place/Call bell, personal items and telephone in reach/Instruct patient to call for assistance before getting out of bed/chair/stretcher/Non-slip footwear applied when patient is off stretcher/[place] to call system/Physically safe environment - no spills, clutter or unnecessary equipment/Purposeful proactive rounding/Room/bathroom lighting operational, light cord in reach

## 2023-05-14 NOTE — ED PROVIDER NOTE - OBJECTIVE STATEMENT
Patient speaking in both english and Congolese  48 yo male presents for re-evaluation of left knee pain for the past three months. Patient states he has standing at a local bus stop and felt it hurting more then normal prompting call to 911. Patient states the initial injury occurred several months ago secondary to a fall and he has had constant pain. He denies additionla symptoms. He has been taking ibuprofen and alcohol to aid with pain control.

## 2023-05-14 NOTE — ED ADULT NURSE REASSESSMENT NOTE - NS ED NURSE REASSESS COMMENT FT1
assumed care from RN [place].  pt resting comfortably in stretcher.  visible chest rise and fall noted.  NAD at this time.

## 2023-05-15 VITALS
TEMPERATURE: 98 F | DIASTOLIC BLOOD PRESSURE: 69 MMHG | SYSTOLIC BLOOD PRESSURE: 111 MMHG | HEART RATE: 80 BPM | OXYGEN SATURATION: 97 % | RESPIRATION RATE: 20 BRPM

## 2023-05-15 PROCEDURE — 82962 GLUCOSE BLOOD TEST: CPT

## 2023-05-15 PROCEDURE — T1013: CPT

## 2023-05-15 PROCEDURE — 73564 X-RAY EXAM KNEE 4 OR MORE: CPT

## 2023-05-15 PROCEDURE — 99283 EMERGENCY DEPT VISIT LOW MDM: CPT

## 2023-05-16 ENCOUNTER — EMERGENCY (EMERGENCY)
Facility: HOSPITAL | Age: 48
LOS: 1 days | Discharge: HOME | End: 2023-05-16
Attending: EMERGENCY MEDICINE
Payer: MEDICAID

## 2023-05-16 VITALS — HEIGHT: 66 IN | WEIGHT: 134.92 LBS

## 2023-05-16 VITALS
OXYGEN SATURATION: 99 % | HEART RATE: 86 BPM | RESPIRATION RATE: 17 BRPM | SYSTOLIC BLOOD PRESSURE: 127 MMHG | DIASTOLIC BLOOD PRESSURE: 76 MMHG | TEMPERATURE: 98 F

## 2023-05-16 PROCEDURE — T1013: CPT

## 2023-05-16 PROCEDURE — 99283 EMERGENCY DEPT VISIT LOW MDM: CPT

## 2023-05-16 PROCEDURE — 99282 EMERGENCY DEPT VISIT SF MDM: CPT

## 2023-05-16 NOTE — ED ADULT NURSE NOTE - LATERALITY
Lactation consult complete.     7 lb 0.9 oz (3200 g)     3200 g(Filed from Delivery Summary) (06/27/20 1854)     0%      Upon entering room baby was nursing well at right breast in football position. Mom had baby positioned perfectly, baby was latched deeply with good sucking and swallowing noted. Mom has good colostrum supply, able to express easily when teaching hand expression.      Encouraged positioning infant chest to chest and positioning of hand in C-hold with breast compressions. This will assist infant with receiving the most amount of breast milk.      IMom's Medication reviewed yes     Breast pump will fax to [facility]     Infant's feeding I&O reviewed with no lactation concerns.     Breastfeeding plan of care:     Mom encouraged to continue to breastfeed infant every 2-3 hours from start of feedings or 8 to 12 times a day. Mom encouraged to offer both breast and continue to stimulate infant for more efficient feedings.      Breastfeeding educational material given and explained to Mom. Also, discussed rolly videos and resources in lactation packet. Mom verbalized understanding of breastfeeding education.     Mom has been encouraged to call for any questions, concerns or assistance if needed.  All of mom's questions have been answered at this time.      20 mins                   
left

## 2023-05-16 NOTE — ED PROVIDER NOTE - CARE PROVIDER_API CALL
Darrell Dukes (DO)  Orthopedics  [address] [place] [address] [id number]  Phone: [phone]  Fax: [phone]  Follow Up Time: Routine

## 2023-05-16 NOTE — ED ADULT NURSE NOTE - NSFALLRISKINTERV_ED_ALL_ED

## 2023-05-16 NOTE — ED PROVIDER NOTE - CLINICAL SUMMARY MEDICAL DECISION MAKING FREE TEXT BOX
Pt with chronic knee pain, no new injury. ACE applied. walking without assistance. dc with ortho Follow up

## 2023-05-16 NOTE — ED PROVIDER NOTE - NSFOLLOWUPINSTRUCTIONS_ED_ALL_ED_FT
1. Return to ED for worsening, progressive or any other concerning symptoms   2. Follow up with your primary care doctor in 2-3days   3. Follow up with orthopedic clinic [phone].   Open Thursday 1-9pm  Saturday 8-4pm     Sprain    A sprain is a stretch or tear in one of the tough, fiber-like tissues (ligaments) in your body. This is caused by an injury to the area such as a twisting mechanism. Symptoms include pain, swelling, or bruising. Rest that area over the next several days and slowly resume activity when tolerated. Ice can help with swelling and pain.     SEEK IMMEDIATE MEDICAL CARE IF YOU HAVE ANY OF THE FOLLOWING SYMPTOMS: worsening pain, inability to move that body part, numbness or tingling.

## 2023-05-16 NOTE — ED PROVIDER NOTE - PATIENT PORTAL LINK FT
You can access the FollowMyHealth Patient Portal offered by [facility] by registering at the following website: [email or web address]. By joining [employer]’s FollowMyHealth portal, you will also be able to view your health information using other applications (apps) compatible with our system.

## 2023-05-16 NOTE — ED PROVIDER NOTE - IV ALTEPLASE EXCL ABS HIDDEN
Detail Level: Generalized Price (Do Not Change): 0.00 Instructions: This plan will send the code FBSD to the PM system.  DO NOT or CHANGE the price. show

## 2023-05-16 NOTE — ED ADULT TRIAGE NOTE - CHIEF COMPLAINT QUOTE
[place] from gas station for alcohol intoxication.  pt c/o left knee pain.  Patient undressed and placed in yellow gown, belongings secured.

## 2023-05-16 NOTE — ED PROVIDER NOTE - PHYSICAL EXAMINATION
Gen: NAD, AOx3  Head: NCAT  HEENT: EOMI, oral mucosa moist, normal conjunctiva, neck supple  Lung: no respiratory distress  CV:  Normal perfusion  MSK: No edema, no visible deformities,+ttp medial joint line no laxity in ACL/PCL/LCL/MCL  Neuro: No focal neurologic deficits  Skin: No rash   Psych: normal affect

## 2023-05-16 NOTE — ED PROVIDER NOTE - OBJECTIVE STATEMENT
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 no swelling. no new injury. able to walk. no deformity. no weakness/paresthesias

## 2023-05-29 ENCOUNTER — EMERGENCY (EMERGENCY)
Facility: HOSPITAL | Age: 48
LOS: 1 days | Discharge: HOME | End: 2023-05-29
Attending: STUDENT IN AN ORGANIZED HEALTH CARE EDUCATION/TRAINING PROGRAM
Payer: MEDICAID

## 2023-05-29 VITALS
TEMPERATURE: 97 F | RESPIRATION RATE: 16 BRPM | SYSTOLIC BLOOD PRESSURE: 123 MMHG | OXYGEN SATURATION: 99 % | DIASTOLIC BLOOD PRESSURE: 81 MMHG | HEIGHT: 66 IN | HEART RATE: 82 BPM

## 2023-05-29 VITALS
TEMPERATURE: 98 F | SYSTOLIC BLOOD PRESSURE: 121 MMHG | RESPIRATION RATE: 17 BRPM | HEART RATE: 78 BPM | DIASTOLIC BLOOD PRESSURE: 76 MMHG | OXYGEN SATURATION: 99 %

## 2023-05-29 PROCEDURE — 99285 EMERGENCY DEPT VISIT HI MDM: CPT

## 2023-05-29 PROCEDURE — 99283 EMERGENCY DEPT VISIT LOW MDM: CPT

## 2023-05-29 NOTE — ED ADULT NURSE NOTE - CHIEF COMPLAINT QUOTE
pt BIBA , found laying outside [facility].  pt admits to alcohol use.  Patient undressed and placed in yellow gown, belongings secured.

## 2023-05-29 NOTE — ED ADULT TRIAGE NOTE - CHIEF COMPLAINT QUOTE
pt BIBA , found laying outside [facility].  pt admits to alcohol use.  Patient undressed and placed in yellow gown, belongings secured.

## 2023-05-29 NOTE — ED PROVIDER NOTE - NSFOLLOWUPINSTRUCTIONS_ED_ALL_ED_FT
Alcohol Abuse    Alcohol intoxication occurs when the amount of alcohol that a person has consumed impairs his or her ability to mentally and physically function. Chronic alcohol consumption can also lead to a variety of health issues including neurological disease, stomach disease, heart disease, liver disease, etc. Do not drive after drinking alcohol. Drinking enough alcohol to end up in an Emergency Room suggests you may have an alcohol abuse problem. Seek help at a drug addiction center.    SEEK IMMEDIATE MEDICAL CARE IF YOU HAVE ANY OF THE FOLLOWING SYMPTOMS: seizures, vomiting blood, blood in your stool, lightheadedness/dizziness, or becoming shaky to tremulous when you stop drinking.     Abuso de alcohol    La intoxicación por alcohol ocurre cuando la cantidad de alcohol que addison persona ha consumido afecta espino capacidad para funcionar mental y físicamente. El consumo crónico de alcohol también puede conducir a addison variedad de problemas de jenny que incluyen enfermedades neurológicas, enfermedades estomacales, enfermedades cardíacas, hepáticas, etc. No conduzca después de beber alcohol. Beber suficiente alcohol para terminar en addiosn rene de emergencias sugiere que puede tener un problema de abuso de alcohol. Busque ayuda en un centro de adicción a las drogas.    BUSQUE ATENCIÓN MÉDICA INMEDIATA SI TIENE ALGUNO DE LOS SIGUIENTES SÍNTOMAS: convulsiones, vómitos con sandra, sandra en las heces, aturdimiento/mareos o temblor o temblor cuando kendal de beber.

## 2023-05-29 NOTE — ED PROVIDER NOTE - NSFOLLOWUPCLINICS_GEN_ALL_ED_FT
[id number] [place] [address]  Phone: [phone]  Fax:   Follow Up Time: 4-6 Days

## 2023-05-29 NOTE — ED PROVIDER NOTE - OBJECTIVE STATEMENT
Patient with hx of alcoholism, drank too much today, dneies acute complaints, needs to sleep, states also wants something to drink Patient with hx of alcoholism, drank too much today, denies acute complaints, wants to sleep, states also wants some water to drink, denies being in any pain or having any shortness of breath, headache, or nausea.

## 2023-05-29 NOTE — ED PROVIDER NOTE - PATIENT PORTAL LINK FT
You can access the FollowMyHealth Patient Portal offered by [facility] by registering at the following website: [email or web address]. By joining [employer]’s FollowMyHealth portal, you will also be able to view your health information using other applications (apps) compatible with our system.

## 2023-05-29 NOTE — ED ADULT NURSE NOTE - OBJECTIVE STATEMENT
pt BIBA , found laying outside [facility].  pt admits to alcohol use.  Patient undressed and placed in yellow gown, belongings secured.

## 2023-05-29 NOTE — ED PROVIDER NOTE - PHYSICAL EXAMINATION
Gen: NAD, non-toxic, conversational  Eyes: PERRLA, EOMI   HENT: Normocephalic, atraumatic. External ears normal, no rhinorrhea, moist mucous membranes.   CV: RRR, no M/R/G  Resp: CTAB, non-labored, speaking without difficulty on room air  Abd: soft, non tender, non rigid, no guarding or rebound tenderness  Back: No CVAT bilaterally, no midline ttp  Skin: dry, wwp   Neuro: AOx3, speech is mildly slurred and appropriate  Psych: Mood OK, affect euthymic

## 2023-05-29 NOTE — ED PROVIDER NOTE - CLINICAL SUMMARY MEDICAL DECISION MAKING FREE TEXT BOX
47M hx of alcoholism, presenting intoxicated, asking for beverages, will give water, allow to metabolise.     Update: patient clinically sober, friend here to get him, ambulatory, tolerating PO, discharged in good condition.

## 2023-05-31 NOTE — ED PROVIDER NOTE - NSALCOHOLFREQ_GEN_A_CORE_SD
Impression: S/P Cataract Extraction by phacoemulsification with IOL placement; ORA; LRI (Limbal Relaxing Incision) OS - 9 Days. Encounter for surgical aftercare following surgery on a sense organ  Z48.810. Plan: Stay away from pools/hot tubs. No heavy lifting. No driving. Continue medications as directed.   OK for OD Sx. daily

## 2023-06-04 ENCOUNTER — EMERGENCY (EMERGENCY)
Facility: HOSPITAL | Age: 48
LOS: 1 days | Discharge: HOME | End: 2023-06-04
Attending: EMERGENCY MEDICINE
Payer: MEDICAID

## 2023-06-04 VITALS
WEIGHT: 149.91 LBS | TEMPERATURE: 97 F | DIASTOLIC BLOOD PRESSURE: 74 MMHG | OXYGEN SATURATION: 98 % | SYSTOLIC BLOOD PRESSURE: 104 MMHG | HEART RATE: 76 BPM | HEIGHT: 66 IN | RESPIRATION RATE: 18 BRPM

## 2023-06-04 VITALS
DIASTOLIC BLOOD PRESSURE: 76 MMHG | TEMPERATURE: 98 F | OXYGEN SATURATION: 98 % | HEART RATE: 80 BPM | RESPIRATION RATE: 18 BRPM | SYSTOLIC BLOOD PRESSURE: 104 MMHG

## 2023-06-04 LAB
ALBUMIN SERPL ELPH-MCNC: 4.4 G/DL — SIGNIFICANT CHANGE UP (ref 3.3–5.2)
ALP SERPL-CCNC: 212 U/L — HIGH (ref 40–120)
ALT FLD-CCNC: 173 U/L — HIGH
ANION GAP SERPL CALC-SCNC: 14 MMOL/L — SIGNIFICANT CHANGE UP (ref 5–17)
AST SERPL-CCNC: 310 U/L — HIGH
BASOPHILS # BLD AUTO: 0.08 K/UL — SIGNIFICANT CHANGE UP (ref 0–0.2)
BASOPHILS NFR BLD AUTO: 1.5 % — SIGNIFICANT CHANGE UP (ref 0–2)
BILIRUB SERPL-MCNC: 0.4 MG/DL — SIGNIFICANT CHANGE UP (ref 0.4–2)
BUN SERPL-MCNC: 4.5 MG/DL — LOW (ref 8–20)
CALCIUM SERPL-MCNC: 8.5 MG/DL — SIGNIFICANT CHANGE UP (ref 8.4–10.5)
CHLORIDE SERPL-SCNC: 105 MMOL/L — SIGNIFICANT CHANGE UP (ref 96–108)
CO2 SERPL-SCNC: 23 MMOL/L — SIGNIFICANT CHANGE UP (ref 22–29)
CREAT SERPL-MCNC: 0.49 MG/DL — LOW (ref 0.5–1.3)
EGFR: 127 ML/MIN/1.73M2 — SIGNIFICANT CHANGE UP
EOSINOPHIL # BLD AUTO: 0.16 K/UL — SIGNIFICANT CHANGE UP (ref 0–0.5)
EOSINOPHIL NFR BLD AUTO: 2.9 % — SIGNIFICANT CHANGE UP (ref 0–6)
GLUCOSE SERPL-MCNC: 110 MG/DL — HIGH (ref 70–99)
HCT VFR BLD CALC: 39.1 % — SIGNIFICANT CHANGE UP (ref 39–50)
HGB BLD-MCNC: 13.8 G/DL — SIGNIFICANT CHANGE UP (ref 13–17)
IMM GRANULOCYTES NFR BLD AUTO: 0.4 % — SIGNIFICANT CHANGE UP (ref 0–0.9)
LYMPHOCYTES # BLD AUTO: 1.53 K/UL — SIGNIFICANT CHANGE UP (ref 1–3.3)
LYMPHOCYTES # BLD AUTO: 27.9 % — SIGNIFICANT CHANGE UP (ref 13–44)
MCHC RBC-ENTMCNC: 35.3 GM/DL — SIGNIFICANT CHANGE UP (ref 32–36)
MCHC RBC-ENTMCNC: 35.3 PG — HIGH (ref 27–34)
MCV RBC AUTO: 100 FL — SIGNIFICANT CHANGE UP (ref 80–100)
MONOCYTES # BLD AUTO: 0.39 K/UL — SIGNIFICANT CHANGE UP (ref 0–0.9)
MONOCYTES NFR BLD AUTO: 7.1 % — SIGNIFICANT CHANGE UP (ref 2–14)
NEUTROPHILS # BLD AUTO: 3.3 K/UL — SIGNIFICANT CHANGE UP (ref 1.8–7.4)
NEUTROPHILS NFR BLD AUTO: 60.2 % — SIGNIFICANT CHANGE UP (ref 43–77)
PLATELET # BLD AUTO: 195 K/UL — SIGNIFICANT CHANGE UP (ref 150–400)
POTASSIUM SERPL-MCNC: 3.8 MMOL/L — SIGNIFICANT CHANGE UP (ref 3.5–5.3)
POTASSIUM SERPL-SCNC: 3.8 MMOL/L — SIGNIFICANT CHANGE UP (ref 3.5–5.3)
PROT SERPL-MCNC: 8.1 G/DL — SIGNIFICANT CHANGE UP (ref 6.6–8.7)
RBC # BLD: 3.91 M/UL — LOW (ref 4.2–5.8)
RBC # FLD: 12.9 % — SIGNIFICANT CHANGE UP (ref 10.3–14.5)
SODIUM SERPL-SCNC: 142 MMOL/L — SIGNIFICANT CHANGE UP (ref 135–145)
TROPONIN T SERPL-MCNC: <0.01 NG/ML — SIGNIFICANT CHANGE UP (ref 0–0.06)
WBC # BLD: 5.48 K/UL — SIGNIFICANT CHANGE UP (ref 3.8–10.5)
WBC # FLD AUTO: 5.48 K/UL — SIGNIFICANT CHANGE UP (ref 3.8–10.5)

## 2023-06-04 PROCEDURE — 71045 X-RAY EXAM CHEST 1 VIEW: CPT | Mod: 26

## 2023-06-04 PROCEDURE — 93010 ELECTROCARDIOGRAM REPORT: CPT

## 2023-06-04 PROCEDURE — 99285 EMERGENCY DEPT VISIT HI MDM: CPT

## 2023-06-04 NOTE — ED PROVIDER NOTE - CLINICAL SUMMARY MEDICAL DECISION MAKING FREE TEXT BOX
46 y/o M with PMHx alcohol use who presents to the ED for intoxication. Basic labs, trop, EKG, CXR, monitor for sobriety, reassess.

## 2023-06-04 NOTE — ED PROVIDER NOTE - ATTENDING CONTRIBUTION TO CARE
Dr. Knutson, Attending Physician-  I performed a face to face bedside interview with patient regarding history of present illness, review of symptoms and past medical history. I completed an independent physical exam.  I have discussed patient's plan of care with the resident.

## 2023-06-04 NOTE — ED ADULT NURSE NOTE - OBJECTIVE STATEMENT
Pt in yellow gown upon assessment. Pt states he drank a lot of beer today.  8-10.  Denies pain, injury.

## 2023-06-04 NOTE — ED ADULT NURSE NOTE - NSFALLRISKINTERV_ED_ALL_ED
Assistance OOB with selected safe patient handling equipment if applicable/Assistance with ambulation/Communicate fall risk and risk factors to all staff, patient, and family/Monitor gait and stability/Monitor for mental status changes and reorient to person, place, and time, as needed/Provide visual cue: yellow wristband, yellow gown, etc/Reinforce activity limits and safety measures with patient and family/Toileting schedule using arm’s reach rule for commode and bathroom/Use of alarms - bed, stretcher, chair and/or video monitoring/Call bell, personal items and telephone in reach/Instruct patient to call for assistance before getting out of bed/chair/stretcher/Non-slip footwear applied when patient is off stretcher/[place] to call system/Physically safe environment - no spills, clutter or unnecessary equipment/Purposeful Proactive Rounding/Room/bathroom lighting operational, light cord in reach

## 2023-06-04 NOTE — ED ADULT TRIAGE NOTE - CHIEF COMPLAINT QUOTE
Admits to drinking alcohol today.  Awake, alert and oriented.  Changed into yellow gown and belongings secured by [facility].

## 2023-06-04 NOTE — ED PROVIDER NOTE - NSFOLLOWUPINSTRUCTIONS_ED_ALL_ED_FT
Alcohol Abuse    Alcohol intoxication occurs when the amount of alcohol that a person has consumed impairs his or her ability to mentally and physically function. Chronic alcohol consumption can also lead to a variety of health issues including neurological disease, stomach disease, heart disease, liver disease, etc. Do not drive after drinking alcohol. Drinking enough alcohol to end up in an Emergency Room suggests you may have an alcohol abuse problem. Seek help at a drug addiction center.    1) Consider attending an AA meeting   2) If AA meetings are not working well, consider trying a [facility] meeting. There are meetings in person and on zoom. The website is: [email or web address]   3) Consider seeing a primary care physician to discuss medications that can help decrease your craving for alcohol       SEEK IMMEDIATE MEDICAL CARE IF YOU HAVE ANY OF THE FOLLOWING SYMPTOMS: seizures, vomiting blood, blood in your stool, lightheadedness/dizziness, or becoming shaky to tremulous when you stop drinking.

## 2023-06-04 NOTE — ED PROVIDER NOTE - PATIENT PORTAL LINK FT
You can access the FollowMyHealth Patient Portal offered by  by registering at the following website: [email or web address]. By joining [employer]’s FollowMyHealth portal, you will also be able to view your health information using other applications (apps) compatible with our system.

## 2023-06-04 NOTE — ED ADULT NURSE NOTE - CHPI ED NUR SYMPTOMS NEG
How Did The Hair Loss Occur?: sudden in onset
How Severe Is Your Hair Loss?: mild
no pain/no vomiting

## 2023-06-04 NOTE — ED PROVIDER NOTE - NSICDXNOPASTSURGICALHX_GEN_ALL_ED
<-- Click to add NO significant Past Surgical History Inflammation Suggestive Of Cancer Camouflage Histology Text: There was a dense lymphocytic infiltrate which prevented adequate histologic evaluation of adjacent structures.

## 2023-06-04 NOTE — ED PROVIDER NOTE - PHYSICAL EXAMINATION
General: NAD  Head: NC, AT  EENT: no scleral icterus  Cardiac: no lower extremity edema  Respiratory: no respiratory distress   Abdomen: soft, ND, NT, nonperitonitic  MSK/Vascular: soft compartments, warm extremities  Neuro: sensation to light touch intact  Psych: calm, cooperative

## 2023-06-04 NOTE — ED PROVIDER NOTE - OBJECTIVE STATEMENT
46 y/o M with PMHx alcohol use who presents to the ED for intoxication. States that he drank 8-12 beers today. Denies falls, head-strikes, or blood-thinner use. Complaining of chest pain which he states he gets intermittently. Denies any toher complaints at this time.

## 2023-06-04 NOTE — ED PROVIDER NOTE - PROGRESS NOTE DETAILS
Labs reviewed. Patient clinically sober, amble to ambulate with normal gait. Denies SI/Hi or any other complaints at this time. Stable for d/c with PMD f/u and return precautions. - Sacha

## 2023-06-04 NOTE — ED PROVIDER NOTE - NS ED ROS FT
Constitutional: no fever, no chills  Head: NC, AT  Eyes: no redness  ENMT: no nasal congestion/drainage, no sore throat   CV: + chest pain, no edema  Resp: no cough, no dyspnea  GI: no abdominal pain, no nausea, no vomiting, no diarrhea  : no dysuria, no hematuria   Skin: no lesions, no rashes   Neuro: no LOC, no headache, no sensory deficits

## 2023-06-05 ENCOUNTER — EMERGENCY (EMERGENCY)
Facility: HOSPITAL | Age: 48
LOS: 1 days | Discharge: HOME | End: 2023-06-05
Attending: STUDENT IN AN ORGANIZED HEALTH CARE EDUCATION/TRAINING PROGRAM
Payer: MEDICAID

## 2023-06-05 VITALS
HEART RATE: 67 BPM | RESPIRATION RATE: 16 BRPM | DIASTOLIC BLOOD PRESSURE: 73 MMHG | WEIGHT: 164.91 LBS | OXYGEN SATURATION: 94 % | HEIGHT: 66 IN | TEMPERATURE: 98 F | SYSTOLIC BLOOD PRESSURE: 114 MMHG

## 2023-06-05 LAB — GLUCOSE BLDC GLUCOMTR-MCNC: 85 MG/DL — SIGNIFICANT CHANGE UP (ref 70–99)

## 2023-06-05 PROCEDURE — 84484 ASSAY OF TROPONIN QUANT: CPT

## 2023-06-05 PROCEDURE — 85025 COMPLETE CBC W/AUTO DIFF WBC: CPT

## 2023-06-05 PROCEDURE — 99284 EMERGENCY DEPT VISIT MOD MDM: CPT

## 2023-06-05 PROCEDURE — 99285 EMERGENCY DEPT VISIT HI MDM: CPT

## 2023-06-05 PROCEDURE — 82962 GLUCOSE BLOOD TEST: CPT

## 2023-06-05 PROCEDURE — 36415 COLL VENOUS BLD VENIPUNCTURE: CPT

## 2023-06-05 PROCEDURE — 80053 COMPREHEN METABOLIC PANEL: CPT

## 2023-06-05 PROCEDURE — 71045 X-RAY EXAM CHEST 1 VIEW: CPT

## 2023-06-05 PROCEDURE — 93005 ELECTROCARDIOGRAM TRACING: CPT

## 2023-06-05 PROCEDURE — 99285 EMERGENCY DEPT VISIT HI MDM: CPT | Mod: 25

## 2023-06-05 RX ORDER — THIAMINE MONONITRATE (VIT B1) 100 MG
100 TABLET ORAL ONCE
Refills: 0 | Status: COMPLETED | OUTPATIENT
Start: 2023-06-05 | End: 2023-06-05

## 2023-06-05 RX ORDER — FOLIC ACID 0.8 MG
1 TABLET ORAL ONCE
Refills: 0 | Status: COMPLETED | OUTPATIENT
Start: 2023-06-05 | End: 2023-06-05

## 2023-06-05 RX ADMIN — Medication 1 MILLIGRAM(S): at 15:42

## 2023-06-05 RX ADMIN — Medication 100 MILLIGRAM(S): at 15:42

## 2023-06-05 NOTE — ED PROVIDER NOTE - PROGRESS NOTE DETAILS
JK - ambulating on his own, tolerating PO. Clinically sober at this time. Ready for DC with return precautions. Currently stable.

## 2023-06-05 NOTE — ED PROVIDER NOTE - OBJECTIVE STATEMENT
Patient is a 48 yo male with PMHx EtOH abuse [place]. Patient states he had several drinks today and would like to sleep. No complaints at this time. Denies falls, trauma, blood thinners. GCS 15, VSS, moving extremities equally, following commands. Slurred speech, clinically intoxicated. Patient calm and cooperative.

## 2023-06-05 NOTE — ED PROVIDER NOTE - CLINICAL SUMMARY MEDICAL DECISION MAKING FREE TEXT BOX
Patient is a 48 yo male with PMHx EtOH abuse [place] being found intoxicated. Patient states he had several drinks today and would like to sleep. No complaints at this time. GCS 15, VSS, moving extremities equally, no signs of trauma, following commands.       Will check fingerstick, continue to monitor, give folate and thiamine for chronic alcohol use, reassess and likely MTF.

## 2023-06-05 NOTE — ED PROVIDER NOTE - PATIENT PORTAL LINK FT
You can access the FollowMyHealth Patient Portal offered by [facility] by registering at the following website: [email or web address]. By joining [employer]’s FollowMyHealth portal, you will also be able to view your health information using other applications (apps) compatible with our system.

## 2023-06-05 NOTE — ED PROVIDER NOTE - ATTENDING CONTRIBUTION TO CARE
I personally saw the patient with the resident, and completed the key components of the history and physical exam. I then discussed the management plan with the resident.    48yo male with pmh alcohol abuse presents intox. Pt states he had several drinks today.   General: no signs of trauma, somnolent, in no apparent distress.  Head: AT, NC  HEENT: Airway patent., no trevino sign, no raccoon eyes, no hemotympanum   Eyes: clear bilaterally, pupils equal, round and reactive to light.  Cardiac: Normal rate, regular rhythm.  Heart sounds S1, S2.    Respiratory: Breath sounds clear and equal bilaterally.  Abdomen soft, non-tender, no guarding.  MSK: moving all extremities x 4  Neuro: somnolent, follows commands, ataxia, slurred speech  Skin: normal color.  pt when clinically sober reassessed  Const: Awake, alert and oriented. In no acute distress. Well appearing.  HEENT: NC/AT. Moist mucous membranes.  Eyes: No scleral icterus. EOMI.  Neck:. Soft and supple. Full ROM without pain.  Cardiac: Regular rate and regular rhythm. +S1/S2. Peripheral pulses 2+ and symmetric. No LE edema.  Resp: Speaking in full sentences. No evidence of respiratory distress. No wheezes, rales or rhonchi.  Abd: Soft, non-tender, non-distended. Normal bowel sounds in all 4 quadrants. No guarding or rebound.  Back: Spine midline and non-tender. No CVAT.  Skin: No rashes, abrasions or lacerations.  Lymph: No cervical lymphadenopathy.  Neuro: Awake, alert & oriented x 3. Moves all extremities symmetrically.  stable for dc with followup, offered detox refusing at this time

## 2023-06-05 NOTE — ED PROVIDER NOTE - NSFOLLOWUPINSTRUCTIONS_ED_ALL_ED_FT
Alcohol Intoxication  Alcohol intoxication occurs when a person no longer thinks clearly or functions well after drinking alcohol. This is also referred to as becoming impaired. Intoxication can occur with just one drink. The legal definition of alcohol intoxication depends on the amount of alcohol in the blood (blood alcohol concentration, CHANDU). CHANDU of 80–100 mg/dL or higher is commonly considered legally intoxicated. The level of impairment depends on:  The amount of alcohol the person had.  The person's age, gender, and weight.  How often the person drinks.  Whether the person has other medical conditions, such as diabetes, seizures, or a heart condition.  Alcohol intoxication can range from mild to severe. The condition can be dangerous, especially if the person:  Also took certain drugs or prescription medicines.  Drinks a large amount of alcohol in a short period of time (binge drinks).  For women, binge drinking is having four or more drinks at one time.  For men, binge drinking is having five or more drinks at one time.  If you or anyone around you appears intoxicated, speak up and act.    What are the causes?  This condition is caused by drinking alcohol.    What increases the risk?  The following factors may make you more likely to develop this condition:  Peer pressure in young adults.  Difficulty managing stress.  History of drug or alcohol abuse.  Family history of drug or alcohol abuse.  Combining alcohol with drugs.  Low body weight.  Binge drinking.  What are the signs or symptoms?  Symptoms of alcohol intoxication can vary from person to person. Symptoms can be mild, moderate, or severe.    Symptoms of mild alcohol intoxication may include:  Feeling relaxed or sleepy.  Having mild difficulty with coordination, speech, memory, or attention.  Symptoms of moderate alcohol intoxication may include:  Strong anger or extreme sadness.  Moderate difficulty with coordination, speech, memory, or attention.  Symptoms of severe alcohol intoxication may include:  Severe difficulty with coordination, speech, memory, or attention.  Passing out.  Vomiting.  Confusion.  Slow breathing.  Coma.  Intoxication can change quickly from mild to severe. It can cause coma or death, especially in people who do not drink alcohol often.    How is this diagnosed?  Your health care provider will ask you how much alcohol you drank and what kind you had. Intoxication may also be diagnosed based on:  Your symptoms and medical history.  A physical exam.  A blood test that measures CHANDU.  A smell of alcohol on your breath.  How is this treated?  Treatment for alcohol intoxication may include:  Being monitored in an emergency department, hospital, or treatment center until your CHANDU comes down and it is safe for you to go home.  IV fluids to prevent or treat loss of fluid in the body (dehydration).  Medicine to treat nausea or vomiting or to get rid of alcohol in the body.  Counseling about the dangers of using alcohol.  Treatment for substance use disorder.  Oxygen therapy or a breathing machine (ventilator).  Drinking alcohol for a long time can have long-term effects on your brain, heart, and digestive system. These effects can be serious and may also require treatment.    Follow these instructions at home:  A sign showing that a person should not drive.  Eating and drinking    A 12-ounce bottle of beer, a 5-ounce glass of wine, and a 1.5-ounce shot of hard liquor.  Do not drink alcohol if:  Your health care provider tells you not to drink.  You are pregnant, may be pregnant, or are planning to become pregnant.  You are under the legal drinking age, or under 21 years old in the [place]  You are taking medicines that should not be taken with alcohol.  You have a medical condition, and alcohol makes it worse.  You need to drive or perform activities that require you to be alert.  You have substance use disorder.  Ask your health care provider if alcohol is safe for you. If your health care provider allows you to drink alcohol, limit how much you have to:  0–1 drink a day for women who are not pregnant.  0–2 drinks a day for men.  Know how much alcohol is in your drink. In the [place] one drink equals one 12 oz bottle of beer (355 mL), one 5 oz glass of wine (148 mL), or one 1½ oz glass of hard liquor (44 mL).  Avoid drinking alcohol on an empty stomach.  Alcohol increases urination. It is important to stay hydrated and avoid caffeine.  Avoid drinking more than one drink per hour.  When having multiple drinks, drink water or a non-alcoholic beverage between alcoholic drinks.  General instructions    Take over-the-counter and prescription medicines only as told by your health care provider.  Do not drive after drinking any amount of alcohol. Plan for a designated  or another way to go home.  Have someone responsible stay with you while you are intoxicated. You should notbe left alone.  Contact a health care provider if:  You do not feel better after a few days.  You have problems at work, at school, or at home due to drinking.  Get help right away if:  You have any of the following:  Trouble staying awake.  Moderate to severe trouble with coordination, speech, memory, or attention.  You are told you may have had a seizure.  Vomiting bright red blood or material that looks like coffee grounds.  Bloody stool (feces). The blood may make your stool bright red, black, or tarry.  These symptoms may be an emergency. Get help right away. Call 911.  Do not wait to see if the symptoms will go away.  Do not drive yourself to the hospital.  Also, get help right away if:  You have thoughts about hurting yourself or others.  Take one of these steps if you feel like you may hurt yourself or others, or have thoughts about taking your own life:  Call 911.  Call the National Suicide Prevention Lifeline at 1-690.841.1269 or 067. This is open 24 hours a day.  Text the Crisis Text Line at 099917.  Summary  Alcohol intoxication occurs when a person no longer thinks clearly or functions well after drinking alcohol.  Ask your health care provider if alcohol is safe for you. If your health care provider allows you to drink alcohol, limit how much you have.  Contact your health care provider if drinking has caused you problems at work, school, or home.  Get help right away if you have thoughts about hurting yourself or others.  This information is not intended to replace advice given to you by your health care provider. Make sure you discuss any questions you have with your health care provider.

## 2023-06-05 NOTE — ED ADULT NURSE NOTE - OBJECTIVE STATEMENT
Received  pt in A14 H in  yellow gown, belongings  locked up. Pt asleep on stretcher but easily arousable, pt with ETOH on breathe, unable to obtain realiabe hx due to intox. Pt  resp even unlabored, no resp distress noted.  Will continue to monitor.

## 2023-06-05 NOTE — ED PROVIDER NOTE - PHYSICAL EXAMINATION
Gen: no acute distress. Slurring speech, clinically intoxicated  Head: normocephalic, atraumatic, TMs clear bilaterally   Lung: CTAB, no respiratory distress, no wheezing, rales, rhonchi  CV: normal s1/s2, rrr,   Abd: soft, non-tender, non-distended  MSK: No edema, no visible deformities, full range of motion in all 4 extremities  Neuro: No focal neurologic deficits, following commands, moving extremities equally   Skin: No rash

## 2023-06-14 NOTE — ED PROVIDER NOTE - TOBACCO USE
MEDICINE HISTORY AND PHYSICAL    Patient ID:  Alysai Rider  [id number]  32 year old  1990     Primary Care Physician:   Etahn Frankel MD    Chief Complaint:   Abdominal pain.      History of Present Illness:   Patient is a 32 year old  female who is being admitted to the hospital with abdominal pain secondary to recurrent pancreatic pseudocyst versus abscess.  Patient presented to the ER Earlier Today with severe upper abdominal pain for the past couple fo days associated with nausea and dizziness.  Patient was noted to be in quite a bit of distress secondary to pain, but otherwise stable in the ER.  Patient's Initial Workup in the ER revealed leukocytosis and pancreatic pseudocyst versus abscess on her CT abdomen-pelvis as noted below.  Patient's case was discussed with GI, she was  was recommended Hospital Admission for Further Evaluation and Management.  At the time of interview, patient endorsed the above story and denied any other specific or significant complaints or concerns.  Patient was admitted earlier this year for necrotic pancreatitis for which she underwent pancreatic cyst gastrostomy with axios stent.  Patient subsequently underwent pancreatic stent removal in March 2023.  Patient has stayed off of alcohol use and doing well until her symptoms started a couple of days ago as noted above.       Past Medical History:     RAD (reactive airway disease)                                 Allergy                                                       Asthma                                                        GERD (gastroesophageal reflux disease)                        Pancreatitis                                                    Comment: pancreatic cyst    Alcoholic pancreatitis                                        Seizures (CMD)                                                  Comment: 12 or 13 years ago-no longer has them    Essential (primary) hypertension                              Pneumonia                                                      Sinusitis, chronic                                              Comment: allergies    Fracture                                                        Comment: right foot    Anxiety                                                       Chronic pain                                                    Comment: abd and back and chest    Dysphagia                                                     Past Surgical History:    ECTOPIC PREGNANCY SURGERY                       2007          TUBAL LIGATION                                  2017          PANCREATIC PSEUDOCYST DRAINAGE                  02/2023         Comment: egd/eus    ESOPHAGOGASTRODUODENOSCOPY TRANSORAL FLEX W/BX*               ERCP                                            03/16/2023    Family History:   Family History   Problem Relation Age of Onset   • Diabetes Father    • Myocardial Infarction Paternal Uncle    • Cancer Maternal Grandfather         LUNG CANCER   • Patient is unaware of any medical problems Mother        Social History:   Reviewed in EPIC    Medications:   Current Facility-Administered Medications   Medication   • meropenem (MERREM) 1 g in sodium chloride 0.9 % 100 mL IVPB   • albuterol inhaler 1 puff   • metoPROLOL tartrate (LOPRESSOR) tablet 25 mg   • pantoprazole (PROTONIX) EC tablet 40 mg   • sodium chloride (NORMAL SALINE) 0.9 % bolus 500 mL   • sodium chloride 0.9 % flush bag 25 mL   • sodium chloride (PF) 0.9 % injection 2 mL   • sodium chloride 0.9 % flush bag 25 mL   • sodium chloride 0.9% infusion   • Potassium Standard Replacement Protocol (Levels 3.5 and lower)   • Potassium Replacement (Levels 3.6 - 4)   • Magnesium Standard Replacement Protocol   • ondansetron (ZOFRAN) injection 4 mg   • acetaminophen (TYLENOL) tablet 650 mg   • HYDROcodone-acetaminophen (NORCO) 5-325 MG per tablet 1 tablet   • polyethylene glycol (MIRALAX) packet 17 g   • albuterol inhaler 2 puff   •  HYDROmorphone (DILAUDID) injection 0.5 mg   • meropenem (MERREM) 1 g in sodium chloride 0.9 % 100 mL IVPB   • famotidine (PEPCID) injection 20 mg   • hydrALAZINE (APRESOLINE) injection 10 mg       Allergies:   Allergies as of 06/14/2023   • (No Known Allergies)       Review of Systems: A complete 12 plus review of systems was done and was negative except mentioned in History of Present Illness above.    Physical Examination:   Vital Signs:    Visit Vitals  /89 (BP Location: LUE - Left upper extremity, Patient Position: Sitting)   Pulse 79   Temp 98.3 °F (36.8 °C) (Oral)   Resp 18   Ht 5' 3\" (1.6 m)   Wt 65.1 kg (143 lb 8 oz)   LMP 05/15/2023   SpO2 100%   BMI 25.42 kg/m²     General: This is a 32 year old female lying comfortably in bed and appearing in no acute distress. Patient is able to speak in full sentences.  Psych: She is awake alert and oriented to time, place and person. Mood and affect are appropriate.  Head: Appears to be atraumatic and normocephalic.  Eyes: Extraocular movements appear to be intact.  Throat: Oropharyngeal exam reveals mildly dry oral mucosa.   Neck: Supple and symmetric. Patient is not using accessory muscles of respiration.    Cardio: Reveals presence of first and second heart sounds. Regular rate and rhythm. No murmurs, rubs or gallops.   Pulm: Reveals good effort and lungs are clear to auscultation bilaterally. There are no wheezes, crackles or rhonchi.  GI:  Normoactive bowel sounds. Soft, upper abdominal quadrant tenderness to palpation noted on patient's examination and non-distended. There is no guarding, rigidity or rebound tenderness.   Extremities: No clubbing, cyanosis or significant bilateral lower extremity edema noted on patient's examination.  Neurologic: Grossly intact and patient moving all 4 extremities spontaneously.      Derm: No obvious rashes are present.  Musculoskeletal: No gross deformities or abnormalities.  Heme: No obvious bruising or active bleeding  noted.  Vascular: Intact and equal peripheral pulsations bilaterally.  /Rectal Exam: Not performed.      Labs:  Lab Results   Component Value Date    SODIUM 137 06/14/2023    POTASSIUM 4.2 06/14/2023    CHLORIDE 103 06/14/2023    CO2 22 06/14/2023    GLUCOSE 108 (H) 06/14/2023    BUN 11 06/14/2023    CREATININE 0.61 06/14/2023    ALBUMIN 4.0 06/14/2023    BILIRUBIN 0.4 06/14/2023    LACTA 0.7 01/31/2023    AST 17 06/14/2023    PHOS 1.2 (L) 01/26/2023    INR 1.1 01/31/2023     Lab Results   Component Value Date    PTT 34 (H) 01/31/2023    WBC 16.9 (H) 06/14/2023    HGB 14.3 06/14/2023    HCT 43.4 06/14/2023     06/14/2023    BAND 2 09/04/2017    RAPDTR <0.02 08/18/2020    CRP 5.1 (H) 02/13/2023    TSH 0.976 03/06/2017       Diagnostics:   CT ABDOMEN PELVIS W CONTRAST - IV contrast only   Final Result   IMPRESSION:   1. Cyst gastrostomy tube has been removed in the interim, with   reaccumulation of a fluid collection between the anterior pancreas and the   greater curvature of the stomach measuring up to 6.2 x 2.8 x 2.1 cm. While   pseudocyst or abscess must be considered, this collection does not have   significant wall thickening or internal air at this time.   2. No significant peripancreatic fat stranding, pancreatic mass lesion, or   pancreatic calcifications.   3. Otherwise unremarkable and unchanged exam of the abdomen/pelvis.      Electronically Signed by: Reilly Henning MD    Signed on: 6/14/2023 1:27 PM    Workstation ID: [id number]          ASSESSMENT:   Abdominal pain secondary to recurrent pancreatic pseudocyst versus abscess.    Leukocytosis.    History of  necrotic pancreatitis for which she underwent pancreatic cyst gastrostomy with axios stent.  Patient subsequently underwent pancreatic stent removal in March 2023.    History of alcohol abuse:  Patient reports she has remained abstinent for a while now.    GERD.    Chronic pain.    Anxiety.      PLAN:   Admit and monitor patient on the  medical/surgical floor.    Routine Telemetry Monitoring.    Judicious IV Fluid Hydration.    Pain and Nausea Control.    Monitor and Replete patient's Electrolytes as needed per Electrolyte Replacement Protocol.    Initiate patient on Empiric IV Antibiotic Therapy with Meropenem.    Await patient's Infectious Workup Results (blood cultures and sensitivities).    Monitor patient's WBC Count, procalcitonin level, lactic acid venous level and for any Fevers/Chills.    Keep patient NPO with the exception of medications for now.      GI consult requested for further recommendations.  Appreciate input.    Continue patient's Home Medication Regimen as appropriate for her Chronic Medical Conditions.    Monitor patient's Blood Pressures.  IV Hydralazine PRN ordered for SBP > 160.    PT and OT Evaluations requested for further recommendations.  Appreciate inputs.        GI prophylaxis: IV Pepcid.    DVT prophylaxis: No pharmacologic DVT prophylaxis/anticoagulation in case patient ends up needing any intervention.  SCD's and EFRAIN Stockings.      Is the patient expected to require at least a two midnight stay in the hospital? Yes -  I certify that I expect inpatient services for greater than two midnights are medically necessary for this patient.  Please see H&P and MD Progress Notes for additional information about the patient's course of treatment.    Code status:  FULL CODE.      Plan was also discussed with ER physician Dr. Fabiano Rush and RN.    Qasim Diaz MD  6/14/2023  4:21 PM       Unknown if ever smoked

## 2023-06-22 ENCOUNTER — EMERGENCY (EMERGENCY)
Facility: HOSPITAL | Age: 48
LOS: 1 days | Discharge: HOME | End: 2023-06-22
Attending: EMERGENCY MEDICINE
Payer: MEDICAID

## 2023-06-22 VITALS
SYSTOLIC BLOOD PRESSURE: 124 MMHG | HEART RATE: 124 BPM | WEIGHT: 149.91 LBS | DIASTOLIC BLOOD PRESSURE: 73 MMHG | RESPIRATION RATE: 16 BRPM | OXYGEN SATURATION: 100 % | HEIGHT: 66 IN | TEMPERATURE: 98 F

## 2023-06-22 VITALS
TEMPERATURE: 98 F | RESPIRATION RATE: 18 BRPM | OXYGEN SATURATION: 97 % | HEART RATE: 77 BPM | DIASTOLIC BLOOD PRESSURE: 71 MMHG | SYSTOLIC BLOOD PRESSURE: 110 MMHG

## 2023-06-22 LAB
ALBUMIN SERPL ELPH-MCNC: 4.7 G/DL — SIGNIFICANT CHANGE UP (ref 3.3–5.2)
ALP SERPL-CCNC: 240 U/L — HIGH (ref 40–120)
ALT FLD-CCNC: 82 U/L — HIGH
ANION GAP SERPL CALC-SCNC: 17 MMOL/L — SIGNIFICANT CHANGE UP (ref 5–17)
APAP SERPL-MCNC: <3 UG/ML — LOW (ref 10–26)
AST SERPL-CCNC: 176 U/L — HIGH
BASOPHILS # BLD AUTO: 0.08 K/UL — SIGNIFICANT CHANGE UP (ref 0–0.2)
BASOPHILS NFR BLD AUTO: 1.3 % — SIGNIFICANT CHANGE UP (ref 0–2)
BILIRUB SERPL-MCNC: 0.6 MG/DL — SIGNIFICANT CHANGE UP (ref 0.4–2)
BUN SERPL-MCNC: 4.9 MG/DL — LOW (ref 8–20)
CALCIUM SERPL-MCNC: 8.9 MG/DL — SIGNIFICANT CHANGE UP (ref 8.4–10.5)
CHLORIDE SERPL-SCNC: 100 MMOL/L — SIGNIFICANT CHANGE UP (ref 96–108)
CO2 SERPL-SCNC: 26 MMOL/L — SIGNIFICANT CHANGE UP (ref 22–29)
CREAT SERPL-MCNC: 0.51 MG/DL — SIGNIFICANT CHANGE UP (ref 0.5–1.3)
EGFR: 126 ML/MIN/1.73M2 — SIGNIFICANT CHANGE UP
EOSINOPHIL # BLD AUTO: 0.21 K/UL — SIGNIFICANT CHANGE UP (ref 0–0.5)
EOSINOPHIL NFR BLD AUTO: 3.3 % — SIGNIFICANT CHANGE UP (ref 0–6)
ETHANOL SERPL-MCNC: 446 MG/DL — HIGH (ref 0–9)
GLUCOSE SERPL-MCNC: 96 MG/DL — SIGNIFICANT CHANGE UP (ref 70–99)
HCT VFR BLD CALC: 40.6 % — SIGNIFICANT CHANGE UP (ref 39–50)
HGB BLD-MCNC: 14.6 G/DL — SIGNIFICANT CHANGE UP (ref 13–17)
IMM GRANULOCYTES NFR BLD AUTO: 0.2 % — SIGNIFICANT CHANGE UP (ref 0–0.9)
LYMPHOCYTES # BLD AUTO: 1.86 K/UL — SIGNIFICANT CHANGE UP (ref 1–3.3)
LYMPHOCYTES # BLD AUTO: 29.1 % — SIGNIFICANT CHANGE UP (ref 13–44)
MCHC RBC-ENTMCNC: 35.4 PG — HIGH (ref 27–34)
MCHC RBC-ENTMCNC: 36 GM/DL — SIGNIFICANT CHANGE UP (ref 32–36)
MCV RBC AUTO: 98.3 FL — SIGNIFICANT CHANGE UP (ref 80–100)
MONOCYTES # BLD AUTO: 0.55 K/UL — SIGNIFICANT CHANGE UP (ref 0–0.9)
MONOCYTES NFR BLD AUTO: 8.6 % — SIGNIFICANT CHANGE UP (ref 2–14)
NEUTROPHILS # BLD AUTO: 3.68 K/UL — SIGNIFICANT CHANGE UP (ref 1.8–7.4)
NEUTROPHILS NFR BLD AUTO: 57.5 % — SIGNIFICANT CHANGE UP (ref 43–77)
PLATELET # BLD AUTO: 185 K/UL — SIGNIFICANT CHANGE UP (ref 150–400)
POTASSIUM SERPL-MCNC: 4 MMOL/L — SIGNIFICANT CHANGE UP (ref 3.5–5.3)
POTASSIUM SERPL-SCNC: 4 MMOL/L — SIGNIFICANT CHANGE UP (ref 3.5–5.3)
PROT SERPL-MCNC: 8.6 G/DL — SIGNIFICANT CHANGE UP (ref 6.6–8.7)
RBC # BLD: 4.13 M/UL — LOW (ref 4.2–5.8)
RBC # FLD: 13.2 % — SIGNIFICANT CHANGE UP (ref 10.3–14.5)
SALICYLATES SERPL-MCNC: <0.6 MG/DL — LOW (ref 10–20)
SODIUM SERPL-SCNC: 143 MMOL/L — SIGNIFICANT CHANGE UP (ref 135–145)
WBC # BLD: 6.39 K/UL — SIGNIFICANT CHANGE UP (ref 3.8–10.5)
WBC # FLD AUTO: 6.39 K/UL — SIGNIFICANT CHANGE UP (ref 3.8–10.5)

## 2023-06-22 PROCEDURE — 80307 DRUG TEST PRSMV CHEM ANLYZR: CPT

## 2023-06-22 PROCEDURE — T1013: CPT

## 2023-06-22 PROCEDURE — 36415 COLL VENOUS BLD VENIPUNCTURE: CPT

## 2023-06-22 PROCEDURE — 84484 ASSAY OF TROPONIN QUANT: CPT

## 2023-06-22 PROCEDURE — 85025 COMPLETE CBC W/AUTO DIFF WBC: CPT

## 2023-06-22 PROCEDURE — 99285 EMERGENCY DEPT VISIT HI MDM: CPT

## 2023-06-22 PROCEDURE — 80053 COMPREHEN METABOLIC PANEL: CPT

## 2023-06-22 PROCEDURE — 99284 EMERGENCY DEPT VISIT MOD MDM: CPT

## 2023-06-22 RX ORDER — SODIUM CHLORIDE 9 MG/ML
1000 INJECTION INTRAMUSCULAR; INTRAVENOUS; SUBCUTANEOUS ONCE
Refills: 0 | Status: COMPLETED | OUTPATIENT
Start: 2023-06-22 | End: 2023-06-22

## 2023-06-22 RX ORDER — CALAMINE AND ZINC OXIDE AND PHENOL 160; 10 MG/ML; MG/ML
1 LOTION TOPICAL ONCE
Refills: 0 | Status: COMPLETED | OUTPATIENT
Start: 2023-06-22 | End: 2023-06-22

## 2023-06-22 RX ADMIN — CALAMINE AND ZINC OXIDE AND PHENOL 1 APPLICATION(S): 160; 10 LOTION TOPICAL at 22:34

## 2023-06-22 RX ADMIN — Medication 50 MILLIGRAM(S): at 23:04

## 2023-06-22 RX ADMIN — SODIUM CHLORIDE 1000 MILLILITER(S): 9 INJECTION INTRAMUSCULAR; INTRAVENOUS; SUBCUTANEOUS at 20:34

## 2023-06-22 NOTE — ED ADULT NURSE NOTE - OBJECTIVE STATEMENT
alert and oriented x4. Pt presenting to ED from home complaining of alcohol intoxication. pt reports drinking 8 beers today and last drink was approx. 8 hours ago. Pt reports drinking alcohol everyday. Pt also has a rash across bilateral upper extremities. Markie chest pain, shortness of breath, fever, chills. IV placed, labs drawn and sent. Placed on continuous cardiac monitoring.

## 2023-06-22 NOTE — ED PROVIDER NOTE - PATIENT PORTAL LINK FT
You can access the FollowMyHealth Patient Portal offered by [facility] by registering at the following website: [email or web address]. By joining [employer]’s FollowMyHealth portal, you will also be able to view your health information using other applications (apps) compatible with our system.

## 2023-06-22 NOTE — ED PROVIDER NOTE - OBJECTIVE STATEMENT
46 y/o M hx of alcohol abuse presents for alcohol intoxication. requesting detox today.  terrence at bedside. states he drank 8 beers today, last drink aprox 8 hours ago but unsure. has been drinking alcohol everyday for past 21 years. endorsing rash to bilateral upper extremities, states it is itchy in nature. denies chest pain/sob. denies abdominal pain. denies nausea/vomiting. denies fever/chills. denies other ilicit drug use.

## 2023-06-22 NOTE — ED PROVIDER NOTE - PHYSICAL EXAMINATION
General: intoxicated appearing male in no acute distress  HEENT: Normocephalic, atraumatic. Moist mucous membranes. Oropharynx clear. No lymphadenopathy.  Eyes: No scleral icterus. EOMI. SAGAR.  Neck:. Soft and supple. Full ROM without pain. No midline tenderness  Cardiac: Regular rate and regular rhythm. No murmurs, rubs, gallops. Peripheral pulses 2+ and symmetric. No LE edema.  Resp: Lungs CTAB. Speaking in full sentences. No wheezes, rales or rhonchi.  Abd: Soft, non-tender, non-distended. No guarding or rebound. No scars, masses, or lesions.  Back: Spine midline and non-tender. No CVA tenderness.    Skin: +streaks of linear erythematous, raised rash over bilateral upper extremities, no purulent discharge, no crepitus.   Neuro: AO x 3. Moves all extremities symmetrically. Motor strength and sensation grossly intact.

## 2023-06-22 NOTE — ED ADULT TRIAGE NOTE - CHIEF COMPLAINT QUOTE
Patient is ETOH. States he drank "40% alcohol", unknown how much. Patient also has poison ivy on the right eye, right arm, and upper lip. Belongings secured and patient changed into yellow gown.

## 2023-06-22 NOTE — ED PROVIDER NOTE - PROGRESS NOTE DETAILS
Patient reassessed- clinically sober, ambulatory with steady gait, NAD, AOx3. Requesting Dc home. Will storm. Katalina Toscano DO

## 2023-06-22 NOTE — ED PROVIDER NOTE - NS ED ROS FT
General: Denies fever, chills  HEENT: Denies sensory changes, sore throat  Neck: Denies neck pain, neck stiffness  Resp: Denies coughing, SOB  Cardiovascular: Denies CP, palpitations, LE edema  GI: Denies nausea, vomiting, abdominal pain, diarrhea, constipation, blood in stool  : Denies dysuria, hematuria, frequency, incontinence  MSK: Denies back pain  Neuro: Denies HA, dizziness, numbness, weakness  Skin: +rashes.

## 2023-06-22 NOTE — ED ADULT NURSE NOTE - NSFALLUNIVINTERV_ED_ALL_ED
Bed/Stretcher in lowest position, wheels locked, appropriate side rails in place/Call bell, personal items and telephone in reach/Instruct patient to call for assistance before getting out of bed/chair/stretcher/Non-slip footwear applied when patient is off stretcher/[place] to call system/Physically safe environment - no spills, clutter or unnecessary equipment/Purposeful proactive rounding/Room/bathroom lighting operational, light cord in reach

## 2023-06-22 NOTE — ED PROVIDER NOTE - CLINICAL SUMMARY MEDICAL DECISION MAKING FREE TEXT BOX
46 y/o M hx of alcohol abuse presents for alcohol intoxication. requesting detox today.  terrence at bedside. states he drank 8 beers today, last drink aprox 8 hours ago but unsure.  +streaks of linear erythematous, raised rash over bilateral upper extremities, no purulent discharge, no crepitus.   intoxicated appearing male. no tremors, tongue fasciculations currently.   labs, check alcohol level, monitor, reassess for possible detox. calamine lotion for localized rash, possible poison ivy on UE.

## 2023-06-22 NOTE — ED PROVIDER NOTE - ATTENDING CONTRIBUTION TO CARE
Dr. Ambrocio : I have personally seen and examined this patient at the bedside. I have fully participated in the care of this patient. I have reviewed all pertinent clinical information, including history, physical exam, plan and the Resident's note and agree except as noted.     48 y/o M hx of alcohol abuse presents for alcohol intoxication. requesting detox today.  terrence at bedside. states he drank 8 beers today, last drink aprox 8 hours ago but unsure. has been drinking alcohol everyday for past 21 years. endorsing rash to bilateral upper extremities, states it is itchy in nature- thinks its poison ivy. no trauma. notes sometimes has cp intermittently x months none tonight. dneies si/hi  Denies f/c/n/v//sob/palpitations/cough/abd.pain/d/c/dysuria/hematuria. no sick contacts/recent travel.    PE:  head; atraumatic normocephalic  eyes: perrla  Heart: rrr s1s2  lungs: ctab  abd: soft, nt nd + bs no rebound/guarding no cva ttp  le: no swelling no calf ttp  back: no midline cervical/thoracic/lumbar ttp    -->intox wants detox no head trauma will check labs 1 trop ekg [facility] protocol sbirt

## 2023-08-10 ENCOUNTER — EMERGENCY (EMERGENCY)
Facility: HOSPITAL | Age: 48
LOS: 1 days | Discharge: HOME | End: 2023-08-10
Attending: STUDENT IN AN ORGANIZED HEALTH CARE EDUCATION/TRAINING PROGRAM
Payer: MEDICAID

## 2023-08-10 VITALS
HEART RATE: 84 BPM | RESPIRATION RATE: 18 BRPM | TEMPERATURE: 98 F | OXYGEN SATURATION: 96 % | SYSTOLIC BLOOD PRESSURE: 125 MMHG | HEIGHT: 66 IN | DIASTOLIC BLOOD PRESSURE: 88 MMHG

## 2023-08-10 PROCEDURE — 71250 CT THORAX DX C-: CPT | Mod: 26,ME

## 2023-08-10 PROCEDURE — G1004: CPT

## 2023-08-10 PROCEDURE — 99285 EMERGENCY DEPT VISIT HI MDM: CPT

## 2023-08-10 PROCEDURE — 72125 CT NECK SPINE W/O DYE: CPT | Mod: MG

## 2023-08-10 PROCEDURE — 70450 CT HEAD/BRAIN W/O DYE: CPT | Mod: 26,MG

## 2023-08-10 PROCEDURE — 82962 GLUCOSE BLOOD TEST: CPT

## 2023-08-10 PROCEDURE — 99284 EMERGENCY DEPT VISIT MOD MDM: CPT

## 2023-08-10 PROCEDURE — 72125 CT NECK SPINE W/O DYE: CPT | Mod: 26,MG

## 2023-08-10 PROCEDURE — 71250 CT THORAX DX C-: CPT | Mod: ME

## 2023-08-10 PROCEDURE — 70450 CT HEAD/BRAIN W/O DYE: CPT | Mod: MG

## 2023-08-10 NOTE — ED ADULT NURSE NOTE - NSFALLHARMRISKINTERV_ED_ALL_ED

## 2023-08-10 NOTE — ED ADULT TRIAGE NOTE - CHIEF COMPLAINT QUOTE
BIBEMS c/o fall.  pt states he drank 8 beers today and fell.  pt has blood to the posterior aspect of his head.  pt is AOx2, is laughing in triage.  reports mild headache.  denies nausea, vomiting, abd pain, dizziness, lightheadedness,  no s/s acute distress noted.  pt placed in yellow gown BIBEMS c/o fall.  pt states he drank 8 beers today and fell.  pt has blood to the posterior aspect of his head.  pt is AOx2, is laughing in triage.  reports mild headache.  denies nausea, vomiting, abd pain, dizziness, lightheadedness,  no s/s acute distress noted.  pt placed in yellow gown.  dr malagon called to bedside, priority ct of head, neck ordered.  in CT, pt started to complain about rib pain - chest CT ordered.

## 2023-08-10 NOTE — ED PROVIDER NOTE - CLINICAL SUMMARY MEDICAL DECISION MAKING FREE TEXT BOX
47y M presents for fall while intoxicated. No acute findings on CT imaging. On reevaluation, pt clinically sober and ambulatory with steady gait. Discharge din stable condition.

## 2023-08-10 NOTE — ED PROVIDER NOTE - PHYSICAL EXAMINATION
Constitutional: Awake, alert, in no acute distress  Eyes: no scleral icterus  HENT: normocephalic, +superficial abrasion to posterior scalp, moist oral mucosa  Neck: supple  CV: RRR, no murmur  Pulm: non-labored respirations, CTAB  Abdomen: soft, non-tender, non-distended  Extremities: no edema, no deformity  Skin: no rash, no jaundice  Neuro: AAOx3, moving all extremities equally

## 2023-08-10 NOTE — ED PROVIDER NOTE - PATIENT PORTAL LINK FT
You can access the FollowMyHealth Patient Portal offered by [facility] by registering at the following website: [email or web address]. By joining [employer]’s FollowMyHealth portal, you will also be able to view your health information using other applications (apps) compatible with our system.

## 2023-08-10 NOTE — ED ADULT NURSE NOTE - CHIEF COMPLAINT QUOTE
BIBEMS c/o fall.  pt states he drank 8 beers today and fell.  pt has blood to the posterior aspect of his head.  pt is AOx2, is laughing in triage.  reports mild headache.  denies nausea, vomiting, abd pain, dizziness, lightheadedness,  no s/s acute distress noted.  pt placed in yellow gown.  dr malagon called to bedside, priority ct of head, neck ordered.  in CT, pt started to complain about rib pain - chest CT ordered.
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
## 2023-08-10 NOTE — ED PROVIDER NOTE - NS ED ROS FT
Constitutional: no fever  CV: +chest pain  Resp: no cough, no shortness of breath  GI: no abdominal pain, no vomiting, no diarrhea  : no dysuria  MSK: no joint pain  Neuro: +headache

## 2023-08-10 NOTE — ED PROVIDER NOTE - OBJECTIVE STATEMENT
47y M w/ hx ETOH abuse presents for fall in the setting of intoxication. Sustained scalp abrasion, also complaining of chest discomfort.

## 2023-08-10 NOTE — ED ADULT NURSE REASSESSMENT NOTE - NS ED NURSE REASSESS COMMENT FT1
pt ambulatory in ED with steady gait. pt requesting to go home. A+O x3. RR even and unlabored. MD Amador at bedside.

## 2023-08-10 NOTE — ED PROVIDER NOTE - NSFOLLOWUPINSTRUCTIONS_ED_ALL_ED_FT
- Follow up with your primary care doctor  - Return to the emergency room for any new or worsening issues

## 2023-08-14 NOTE — ED ADULT NURSE NOTE - HISTORY OF COVID-19 VACCINATION
Patient with immediate coughing with swallow of potassium, daughter states mother has not been on any thickened liquid.  Potassium held, MD updatedf.   Vaccine status unknown

## 2023-08-20 ENCOUNTER — EMERGENCY (EMERGENCY)
Facility: HOSPITAL | Age: 48
LOS: 1 days | End: 2023-08-20
Attending: STUDENT IN AN ORGANIZED HEALTH CARE EDUCATION/TRAINING PROGRAM
Payer: MEDICAID

## 2023-08-20 VITALS
TEMPERATURE: 98 F | HEART RATE: 74 BPM | HEIGHT: 66 IN | RESPIRATION RATE: 18 BRPM | DIASTOLIC BLOOD PRESSURE: 92 MMHG | SYSTOLIC BLOOD PRESSURE: 133 MMHG | WEIGHT: 134.92 LBS | OXYGEN SATURATION: 97 %

## 2023-08-20 PROCEDURE — 99284 EMERGENCY DEPT VISIT MOD MDM: CPT

## 2023-08-20 NOTE — ED PROVIDER NOTE - PATIENT PORTAL LINK FT
You can access the FollowMyHealth Patient Portal offered by [facility] by registering at the following website: [email or web address]. By joining [employer]’s FollowMyHealth portal, you will also be able to view your health information using other applications (apps) compatible with our system.

## 2023-08-20 NOTE — ED ADULT TRIAGE NOTE - CHIEF COMPLAINT QUOTE
pt BIBA from store front. Pt endorses HA and ETOH use, no other medical complaints at this time. Unknown amount of ingestion today, usually drink 12-15 beers a day. Pt awake and alert, placed in yellow gown, belongings secured in triage.

## 2023-08-20 NOTE — ED PROVIDER NOTE - CLINICAL SUMMARY MEDICAL DECISION MAKING FREE TEXT BOX
47M presenting for evaluation of headache in setting of etoh intox, comes to this ED somewhat often, on exam ambulatory, well appearing, no obvious signs of trauma, provided snacks and place to sleep, allow to rest off intoxication, reassess.

## 2023-08-20 NOTE — ED PROVIDER NOTE - NSFOLLOWUPINSTRUCTIONS_ED_ALL_ED_FT
Alcohol Abuse    Alcohol intoxication occurs when the amount of alcohol that a person has consumed impairs his or her ability to mentally and physically function. Chronic alcohol consumption can also lead to a variety of health issues including neurological disease, stomach disease, heart disease, liver disease, etc. Do not drive after drinking alcohol. Drinking enough alcohol to end up in an Emergency Room suggests you may have an alcohol abuse problem. Seek help at a drug addiction center.    SEEK IMMEDIATE MEDICAL CARE IF YOU HAVE ANY OF THE FOLLOWING SYMPTOMS: seizures, vomiting blood, blood in your stool, lightheadedness/dizziness, or becoming shaky to tremulous when you stop drinking.     Strain    Take ibuprofen (or Motrin) 600mg (3 tablets) up to 4 times per day as needed for pain with food or milk.     A strain is a stretch or tear in one of the muscles in your body. This is caused by an injury to the area such as a twisting mechanism. Symptoms include pain, swelling, or bruising. Rest that area over the next several days and slowly resume activity when tolerated. Ice can help with swelling and pain.     SEEK IMMEDIATE MEDICAL CARE IF YOU HAVE ANY OF THE FOLLOWING SYMPTOMS: worsening pain, inability to move that body part, numbness or tingling.

## 2023-08-20 NOTE — ED PROVIDER NOTE - PHYSICAL EXAMINATION
Gen: NAD, non-toxic, conversational  Eyes: PERRL, EOMI   HENT: Normocephalic, atraumatic. External ears normal, no rhinorrhea, tacky mucous membranes.   CV: RRR, no M/R/G  Resp: CTAB, non-labored, speaking without difficulty on room air  Abd: soft, non tender, non rigid, no guarding or rebound tenderness  Back: No CVAT bilaterally, no midline ttp  Skin: dry, wwp   Neuro: AOx3, speech is mildly slurred but appropriate  Psych: Mood ok, affect euthymic Gen: NAD, non-toxic, conversational  Eyes: PERRL, EOMI   HENT: Normocephalic, old appearing ecchymosis around right temple / periorbital area. External ears normal, no rhinorrhea, tacky mucous membranes.   CV: RRR, no M/R/G  Resp: CTAB, non-labored, speaking without difficulty on room air  Abd: soft, non tender, non rigid, no guarding or rebound tenderness  Back: No CVAT bilaterally, no midline ttp  Skin: dry, wwp   Neuro: AOx3, speech is mildly slurred but appropriate  Psych: Mood ok, affect euthymic

## 2023-08-20 NOTE — ED PROVIDER NOTE - PROGRESS NOTE DETAILS
JASWINDER Abarca: patient reassessed, now clinically sober, complaining of right shoulder pain, requesting XR, able to move in normal ROM, will check xr for possible avulsion. JASWINDER Abarca: xray negative for fx/dislocation; normal alignment, will d/c with medicaid cab

## 2023-08-21 VITALS
DIASTOLIC BLOOD PRESSURE: 63 MMHG | TEMPERATURE: 98 F | OXYGEN SATURATION: 98 % | HEART RATE: 65 BPM | SYSTOLIC BLOOD PRESSURE: 103 MMHG | RESPIRATION RATE: 18 BRPM

## 2023-08-21 PROCEDURE — 73030 X-RAY EXAM OF SHOULDER: CPT

## 2023-08-21 PROCEDURE — 73030 X-RAY EXAM OF SHOULDER: CPT | Mod: 26,RT

## 2023-08-21 PROCEDURE — 99285 EMERGENCY DEPT VISIT HI MDM: CPT

## 2023-08-21 RX ORDER — IBUPROFEN 200 MG
600 TABLET ORAL ONCE
Refills: 0 | Status: COMPLETED | OUTPATIENT
Start: 2023-08-21 | End: 2023-08-21

## 2023-08-21 RX ADMIN — Medication 600 MILLIGRAM(S): at 06:53

## 2023-08-24 ENCOUNTER — EMERGENCY (EMERGENCY)
Facility: HOSPITAL | Age: 48
LOS: 1 days | Discharge: HOME | End: 2023-08-24
Attending: STUDENT IN AN ORGANIZED HEALTH CARE EDUCATION/TRAINING PROGRAM
Payer: MEDICAID

## 2023-08-24 VITALS
HEART RATE: 69 BPM | TEMPERATURE: 98 F | OXYGEN SATURATION: 99 % | HEIGHT: 66 IN | SYSTOLIC BLOOD PRESSURE: 126 MMHG | RESPIRATION RATE: 20 BRPM | DIASTOLIC BLOOD PRESSURE: 86 MMHG

## 2023-08-24 PROCEDURE — 72125 CT NECK SPINE W/O DYE: CPT | Mod: 26,MG

## 2023-08-24 PROCEDURE — 72125 CT NECK SPINE W/O DYE: CPT | Mod: MG

## 2023-08-24 PROCEDURE — G1004: CPT

## 2023-08-24 PROCEDURE — 99284 EMERGENCY DEPT VISIT MOD MDM: CPT

## 2023-08-24 PROCEDURE — 70450 CT HEAD/BRAIN W/O DYE: CPT | Mod: 26,MG

## 2023-08-24 PROCEDURE — 82962 GLUCOSE BLOOD TEST: CPT

## 2023-08-24 PROCEDURE — 99285 EMERGENCY DEPT VISIT HI MDM: CPT | Mod: 25

## 2023-08-24 PROCEDURE — 70450 CT HEAD/BRAIN W/O DYE: CPT | Mod: MG

## 2023-08-24 RX ORDER — ACETAMINOPHEN 500 MG
650 TABLET ORAL ONCE
Refills: 0 | Status: COMPLETED | OUTPATIENT
Start: 2023-08-24 | End: 2023-08-24

## 2023-08-24 NOTE — ED ADULT NURSE NOTE - CHPI ED NUR SYMPTOMS NEG
no abdominal pain/no chills/no confusion/no disorientation/no nausea/no pain/no vomiting/no weakness
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
## 2023-08-24 NOTE — ED PROVIDER NOTE - NS ED ROS FT
Constitutional: no fever  CV: no chest pain  GI: no abdominal pain  MSK: no joint pain  Neuro: no headache Constitutional: no fever  CV: no chest pain  GI: no abdominal pain  MSK: no joint pain  Neuro: +headache

## 2023-08-24 NOTE — ED PROVIDER NOTE - PATIENT PORTAL LINK FT
You can access the FollowMyHealth Patient Portal offered by [facility] by registering at the following website: [email or web address]. By joining [employer]’s FollowMyHealth portal, you will also be able to view your health information using other applications (apps) compatible with our system.
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
## 2023-08-24 NOTE — ED PROVIDER NOTE - CARE PLAN
1 Principal Discharge DX:	Alcohol intoxication   Principal Discharge DX:	Alcohol intoxication  Secondary Diagnosis:	Head injury

## 2023-08-24 NOTE — ED PROVIDER NOTE - NSFOLLOWUPINSTRUCTIONS_ED_ALL_ED_FT
Traumatismo craneal    LO QUE NECESITA SABER:    Un traumatismo craneal puede incluir el cuero cabelludo, la ally, el cráneo o el cerebro y puede variar de leve a grave. Los efectos pueden aparecer inmediatamente después de la lesión o desarrollarse más tarde. Los efectos pueden durar poco tiempo o ser permanentes. Es posible que los médicos quieran revisar espino recuperación con el tiempo. El tratamiento puede cambiar a medida que usted se recupera o desarrolla nuevos problemas de jenny por el traumatismo craneal.    INSTRUCCIONES SOBRE EL RYNE HOSPITALARIA:    Llame al número local de emergencias (911 en los [place]), o pídale a alguien que llame si:    No es posible despertarlo.    Usted sufre addison convulsión.    Usted kendal de reaccionar cuando le hablan o se desmaya.    Usted tiene visión borrosa o doble.    Usted arrastra las palabras o se confunde al hablar.    Usted tiene debilidad en espino brazo o pierna, pierde la sensación o presenta nuevos problemas de coordinación.    Migdalia pupilas son más grandes de lo habitual o addison pupila es de tamaño diferente de la otra.    A usted le sale sandra o un líquido nathalia de los oídos o la nariz.  Regrese a la rene de emergencias si:    Usted tiene vómitos reiterados o armen.    Se siente confundido.    El dolor de fiona empeora o se vuelve intenso.    Usted o addison persona que lo cuida nota que le sherry más despertarse que de costumbre.  Llame a espino médico si:    Migdalia síntomas root más de 6 semanas después de la lesión.    Usted tiene preguntas o inquietudes acerca de espino condición o cuidado.  Medicamentos:    Acetaminofénalivia el dolor y baja la fiebre. Está disponible sin receta médica. Pregunte la cantidad y la frecuencia con que debe tomarlos. Siga las indicaciones. Mony las etiquetas de todos los demás medicamentos que esté usando para saber si también contienen acetaminofén, o pregunte a espino médico o farmacéutico. El acetaminofén puede causar daño en el hígado cuando no se shirley de forma correcta.    [place] migdalia medicamentos zoya se le haya indicado.Consulte con espino médico si usted brando que espino medicamento no le está ayudando o si presenta efectos secundarios. Infórmele al médico si usted es alérgico a algún medicamento. Mantenga addison lista actualizada de los medicamentos, las vitaminas y los productos herbales que shirley. Incluya los siguientes datos de los medicamentos: cantidad, frecuencia y motivo de administración. Traiga con usted la lista o los envases de las píldoras a migdalia citas de seguimiento. Lleve la lista de los medicamentos con usted en russell de addison emergencia.  Cuidados personales:    Descanseo laura actividades tranquilas. Limite espino tiempo viendo la televisión, utilizando la computadora o realizando tareas que requieren mucho pensamiento. Regrese lentamente migdalia actividades acostumbradas zoya le indiquen. No practique deportes ni laura actividades que puedan provocarle un golpe en la fiona. Pregunte a espino médico cuándo puede regresar al deporte.    Aplique hieloen la fiona de 15 a 20 minutos cada hora o zoya se le indique. Use addison compresa de hielo o ponga hielo triturado en addison bolsa de plástico. Cúbralo con addison toalla antes de aplicarlo sobre espino piel. El hielo ayuda a evitar daño al tejido y a disminuir la inflamación y el dolor.    Solicite que alguien se quede con usted por 24 horas, o zoya se le indique. Esta persona puede monitorearlo para detectar problemas y pedir ayuda si es necesario. Cuando se despierte, trevor persona debe hacerle algunas preguntas cada pocas horas para chidi si usted está pensando con claridad. Un ejemplo es preguntarle espino nombre o espino dirección.  Prevenga otro traumatismo craneal:    Use un lacie que se ajuste correctamente.Laura esto cuando practica deportes, o jason addison bicicleta, scooter o patineta. Los cascos ayudan a disminuir el riesgo de un traumatismo craneal grave. Hable con espino médico acerca de otras maneras en las que usted puede protegerse si practica deportes.    Use el cinturón de seguridad cada vez que esté en un automóvil.[place] ayuda a disminuir el riesgo de sufrir un traumatismo craneal si tiene un accidente.  Acuda a la consulta de control con espino médico según las indicaciones:Anote migdalia preguntas para que se acuerde de hacerlas zehra migdalia visitas.
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
## 2023-08-24 NOTE — ED PROVIDER NOTE - PHYSICAL EXAMINATION
Constitutional: Awake, alert, in no acute distress, seems intoxicated  Eyes: no scleral icterus  HENT: +palpable hematoma to right parietooccipital scalp, old-appearing ecchymosis to right infraorbital area  Neck: supple, +old-appearing ecchymosis to right anterior neck  CV: RRR, no murmur  Pulm: non-labored respirations, CTAB  Abdomen: soft, non-tender, non-distended  Extremities: no edema, no deformity  Skin: +scattered healing ecchymosis to anterior chest wall  Neuro: moving all extremities equally

## 2023-08-24 NOTE — ED ADULT NURSE NOTE - NSFALLRISKASMT_ED_ALL_ED_DT
[Care Plan reviewed and provided to patient/caregiver] : Care plan reviewed and provided to patient/caregiver [Understands and communicates without difficulty] : Patient/Caregiver understands and communicates without difficulty [FreeTextEntry3] : Continue balanced diet with all food groups. Brush teeth twice a day with toothbrush. Recommend visit to dentist. Help child to maintain consistent daily routines and sleep schedule. School discussed. Ensure home is safe. Teach child about personal safety.\par Vaccines - MMR#2 , FLU #1\par Labs- CBC, CMP, LIPID PANEL\par \par  24-Aug-2023 22:01
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
## 2023-08-24 NOTE — ED ADULT NURSE NOTE - NSFALLUNIVINTERV_ED_ALL_ED
Bed/Stretcher in lowest position, wheels locked, appropriate side rails in place/Call bell, personal items and telephone in reach/Instruct patient to call for assistance before getting out of bed/chair/stretcher/Non-slip footwear applied when patient is off stretcher/[place] to call system/Physically safe environment - no spills, clutter or unnecessary equipment/Purposeful proactive rounding/Room/bathroom lighting operational, light cord in reach

## 2023-08-25 ENCOUNTER — EMERGENCY (EMERGENCY)
Facility: HOSPITAL | Age: 48
LOS: 1 days | Discharge: HOME | End: 2023-08-25
Attending: STUDENT IN AN ORGANIZED HEALTH CARE EDUCATION/TRAINING PROGRAM
Payer: MEDICAID

## 2023-08-25 VITALS
DIASTOLIC BLOOD PRESSURE: 71 MMHG | HEART RATE: 66 BPM | RESPIRATION RATE: 18 BRPM | TEMPERATURE: 98 F | SYSTOLIC BLOOD PRESSURE: 106 MMHG | OXYGEN SATURATION: 97 %

## 2023-08-25 VITALS
OXYGEN SATURATION: 97 % | HEART RATE: 66 BPM | DIASTOLIC BLOOD PRESSURE: 80 MMHG | SYSTOLIC BLOOD PRESSURE: 125 MMHG | RESPIRATION RATE: 16 BRPM | TEMPERATURE: 98 F | WEIGHT: 134.92 LBS | HEIGHT: 66 IN

## 2023-08-25 PROCEDURE — 99285 EMERGENCY DEPT VISIT HI MDM: CPT

## 2023-08-25 PROCEDURE — 99283 EMERGENCY DEPT VISIT LOW MDM: CPT

## 2023-08-25 RX ORDER — ACETAMINOPHEN 500 MG
975 TABLET ORAL ONCE
Refills: 0 | Status: COMPLETED | OUTPATIENT
Start: 2023-08-25 | End: 2023-08-25

## 2023-08-25 RX ADMIN — Medication 975 MILLIGRAM(S): at 19:34

## 2023-08-25 NOTE — ED PROVIDER NOTE - PATIENT PORTAL LINK FT
You can access the FollowMyHealth Patient Portal offered by [facility] by registering at the following website: [email or web address]. By joining [employer]’s FollowMyHealth portal, you will also be able to view your health information using other applications (apps) compatible with our system.
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
## 2023-08-25 NOTE — ED ADULT NURSE REASSESSMENT NOTE - NS ED NURSE REASSESS COMMENT FT1
Patient remains resting in bed. No new CO pain or discomfort @ this time. Visualized ambulation with steady gait to restroom independently. No order for monitor or IV @ this time.
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
## 2023-08-25 NOTE — ED ADULT TRIAGE NOTE - INTERNATIONAL TRAVEL
Return to work      January 1, 2022      Re: Verónica Prakash  [address] [facility] [id number]          To whom it may concern:    This is to certify that Verónica was seen in the clinic on 1/1/2022. Please excuse Verónica from work  1/1/2022 thru 1/9/2022.    She recently tested positive for Covid-19.        Regards,          SIGNATURE:___________________________________________,   1/1/2022  ZIGGY Melo PA-C  [facility]  [address] [facility] [address]  Dept Phone: [phone]        
No

## 2023-08-25 NOTE — ED PROVIDER NOTE - PHYSICAL EXAMINATION
Gen: well appearing, no acute distress, sitting up eating dinner on stretcher, appears inebriated  Head: normocephalic, (+) R posterior auricular ecchymosis  EENT: EOMI, moist mucous membranes, no scleral icterus, no JVD  Lung: no increased work of breathing, clear to auscultation bilaterally, speaking in full sentences  CV: regular rate, regular rhythm, normal s1/s2, 2+ radial pulses bilaterally  CHEST: nontender chest wall, (+) faint old bruising to anterior chest  Abd: soft, non-tender, non-distended, no rebound tenderness or guarding; no CVA tenderness  MSK: No edema, no visible deformities, full range of motion in all 4 extremities. (+) no midline spinal ttp. (+) RUE with extensive ecchymosis to entire anterior region, FROM.   Neuro: Awake, alert.
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
## 2023-08-25 NOTE — ED PROVIDER NOTE - PRINCIPAL DIAGNOSIS
New Prescription: Pred Forte (prednisolone acetate): drops,suspension: 1% 1 drop three times a day as directed into left eye 09- Alcohol intoxication

## 2023-08-25 NOTE — ED ADULT NURSE REASSESSMENT NOTE - NS ED NURSE REASSESS COMMENT FT1
Assumed care of pt at 19:15 as stated in report from COLEMAN Alexandre. Charting as noted. Patient A&O x2.  pt admits to drinking today. complaints of headache, MD Momin aware. denies CP/SOB.  bed locked in lowest position. No signs of acute distress noted, safety maintained. pt remains in yellow gown, belongings secured.

## 2023-08-25 NOTE — ED PROVIDER NOTE - NS ED ROS FT
Gen: No fever, no change in activity level  ENT: No congestion, no rhinorrhea  Resp: No cough, no trouble breathing  Cardiovascular: No chest pain, no palpitation  Gastrointestinal: No nausea, no vomiting, no diarrhea  MS: (+) RUE pain, chest wall pain  Skin: (+) old ecchymosis to chest wall and RUE  Neuro: (+)headache; no abnormal movements  Remainder negative, except as per the HPI
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
## 2023-08-31 ENCOUNTER — EMERGENCY (EMERGENCY)
Facility: HOSPITAL | Age: 48
LOS: 1 days | Discharge: HOME | End: 2023-08-31
Attending: EMERGENCY MEDICINE
Payer: MEDICAID

## 2023-08-31 VITALS
DIASTOLIC BLOOD PRESSURE: 76 MMHG | OXYGEN SATURATION: 97 % | RESPIRATION RATE: 18 BRPM | HEIGHT: 66 IN | TEMPERATURE: 98 F | HEART RATE: 94 BPM | SYSTOLIC BLOOD PRESSURE: 107 MMHG

## 2023-08-31 PROCEDURE — 99283 EMERGENCY DEPT VISIT LOW MDM: CPT

## 2023-08-31 PROCEDURE — 99282 EMERGENCY DEPT VISIT SF MDM: CPT
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
## 2023-09-01 RX ORDER — ACETAMINOPHEN 500 MG
650 TABLET ORAL ONCE
Refills: 0 | Status: DISCONTINUED | OUTPATIENT
Start: 2023-09-01 | End: 2023-09-08

## 2023-09-01 NOTE — ED PROVIDER NOTE - PATIENT PORTAL LINK FT
You can access the FollowMyHealth Patient Portal offered by [facility] by registering at the following website: [email or web address]. By joining [employer]’s FollowMyHealth portal, you will also be able to view your health information using other applications (apps) compatible with our system.

## 2023-09-01 NOTE — ED PROVIDER NOTE - NSFOLLOWUPINSTRUCTIONS_ED_ALL_ED_FT
Paciente: GALINA BRANDT  Profesional que asiste al paciente: Lonny Delong  Trastorno por consumo de bebidas alcohólicas  Alcohol Use Disorder    El trastorno por consumo de bebidas alcohólicas es addison afección en la que el consumo de alcohol altera la leo cotidiana. Las personas con esta afección consumen bebidas alcohólicas en exceso y no pueden controlar mensah consumo.    El trastorno por consumo de bebidas alcohólicas puede causar problemas graves en la lupe física. Puede afectar al cerebro, al corazón y a otros órganos internos. Dejah trastorno puede aumentar el riesgo de padecer ciertos tipos de cáncer y causar problemas de lupe mental, zoya depresión o ansiedad.    ¿Cuáles son las causas?  Esta afección se debe al consumo excesivo de alcohol con el transcurso del tiempo. Algunas personas que sufren esta afección beben para enfrentarse a situaciones negativas de la leo o escapar de ellas. Otros beben para aliviar el dolor o los síntomas de addison enfermedad mental.    ¿Qué incrementa el riesgo?  Es más probable que sufra esta afección si:    Tiene antecedentes familiares de trastorno por consumo de bebidas alcohólicas.  Mensah cultura fomenta el consumo de bebidas alcohólicas hasta el punto de emborracharse (intoxicación alcohólica).  Tuvo un trastorno emocional o de conducta en la infancia.  Ha sufrido abusos.  Es adolescente y:    Tiene un desempeño deficiente en la escuela.  Recibe poca supervisión o guía.  Actúa de forma impulsiva y le gusta valentina riesgos.    ¿Cuáles son los signos o síntomas?  Los síntomas de esta afección incluyen:    Beber más de lo que desea.  Intentar beber menos en varias ocasiones, sin éxito.  Pasar mucho tiempo pensando en el alcohol, intentando conseguir alcohol, bebiendo o recuperándose de andrew bebido.  Continuar bebiendo incluso cuando esto le causa problemas graves en mensah leo cotidiana.  Beber cuando es peligroso hacerlo, por ejemplo, antes de conducir.  Necesitar cada vez más alcohol para obtener el mismo efecto que busca (generar tolerancia).  Tener síntomas de abstinencia al dejar de beber. Entre los síntomas de abstinencia se incluyen los siguientes:    Dificultad para dormir, que produce cansancio (fatiga).  Cambios en el estado de ánimo, con depresión y ansiedad.  Síntomas físicos, zoya frecuencia cardíaca acelerada, respiración rápida, presión arterial elevada (hipertensión), fiebre, sudoración fría o náuseas.  Convulsiones.  Confusión grave.  Chet o sentir cosas que no existen (alucinaciones).  Temblores que no se pueden controlar.    ¿Cómo se diagnostica?  Dejah trastorno se diagnostica mediante addison evaluación. Para comenzar, el médico puede hacerle jose juan o cuatro preguntas sobre mensah consumo de alcohol o entregarle addison prueba sencilla que deberá realizar. Adjuntas ayudará a obtener información baltazar sobre usted.    Pueden hacerle un examen físico o análisis. Pueden derivarlo a un terapeuta especializado en abuso de sustancias.    ¿Cómo se trata?     Con la educación, algunas personas con trastorno por consumo de bebidas alcohólicas pueden reducir mensah consumo. Muchas personas con dejah trastorno no son capaces de modificar mensah comportamiento por mensah cuenta y necesitan la ayuda de especialistas en abuso de sustancias. Estos especialistas son terapeutas que pueden ayudar a diagnosticar la gravedad de mensah trastorno y a decidir qué tipo de tratamiento necesita. Los tratamientos pueden incluir lo siguiente:    Desintoxicación. La desintoxicación consiste en dejar de beber, con la supervisión y las instrucciones de los médicos. El médico puede recetarle medicamentos en la primera semana para ayudar a disminuir los síntomas de la abstinencia. La abstinencia puede ser peligrosa y potencialmente mortal. La desintoxicación puede realizarse en el hogar, en un ámbito extrahospitalario, en un centro de atención primaria, en un hospital o en un centro de tratamiento para abuso de sustancias.  Asesoramiento psicológico. Puede incluir entrevistas motivacionales (EM), terapia familiar o terapia cognitivo-conductual (TCC). Lo realizan terapeutas profesionales o especializados en el tratamiento de pacientes que abusan de sustancias. Un terapeuta puede abordar cómo cambiar mensah comportamiento de consumo de bebidas alcohólicas y cómo mantener los cambios. La terapia tiene zoya objetivos:    Identificar migdalia motivaciones positivas para cambiar.  Identificar y evitar aquello que lo conduzca a beber alcohol.  Enseñarle a planificar mensah cambio de comportamiento.  Proponer sistemas de apoyo que puedan ayudarlo a mantener el cambio.  Medicamentos. Los medicamentos pueden ayudar a tratar dejah trastorno de la siguiente manera:    Disminuyen el deseo intenso de consumir.  Reducen el sentimiento positivo que experimenta al beber alcohol.  Causan addison reacción física desagradable cuando emily alcohol (terapia de aversión).  Grupos de apoyo mutuo zoya Alcohólicos Anónimos (AA). Estos grupos son dirigidos por personas que carson superado emnsah problema con la bebida. Estos grupos brindan apoyo emocional, consejos y orientación.    Algunas personas con esta afección se benefician del tratamiento combinado que se ofrece en algunos centros de tratamiento especializados en el abuso de sustancias.    Siga estas instrucciones en mensah casa:         Medicamentos    Use los medicamentos de venta tatyana y los recetados solamente zoya se lo haya indicado el médico.  Consulte antes de empezar a valentina cualquier medicamento, hierbas o suplementos nuevos.        Instrucciones generales    Pídales a migdalia amistades y familiares que apoyen mensah decisión de mantenerse sobrio.  Evite situaciones en las que se sirva alcohol.  Daya un plan para lidiar con las situaciones tentadoras.  Asista a grupos de apoyo con regularidad.  Practique pasatiempos o actividades que le gusten.  No conduzca después de beber alcohol.  Concurra a todas las visitas de seguimiento zoya se lo haya indicado el médico. Adjuntas es importante.    ¿Cómo se previene?  Si emily alcohol:    Limite la cantidad que emily:    De 0 a 1 medida por día para las mujeres que no estén embarazadas.  De 0 a 2 medidas por día para los hombres.  Esté atento a la cantidad de alcohol que hay en las bebidas que shirley. En los [place], addison medida equivale a addison botella de cerveza de 12 oz (355 ml), un vaso de vino de 5 oz (148 ml) o un vaso de addison bebida alcohólica de daya graduación de 1½ oz (44 ml).  Si tiene addison afección de lupe mental, busque tratamiento. Lleve un estilo de leo saludable, que puede incorporar lo siguiente:    Meditación o respiración profunda.  Realizar actividad física.  Pasar tiempo en contacto con la naturaleza.   Escuchar música.  Charlar con algún familiar o amigo de confianza.  Si eres adolescente:    No bebas alcohol. Bronwyn las reuniones en las que puedas tener la tentación de beber.  No tengas miedo de negarte si alguien te ofrece alcohol. Habla sin reservas acerca de por qué no quieres beber alcohol. Sé un ejemplo positivo para los demás al no consumir alcohol.  Construye relaciones con amigos que no beban.    Dónde buscar más información  Substance Abuse and Mental Health Services Administration (Administración de Servicios por Abuso de Sustancias y Lupe Mental): samhsa.gov  Alcohólicos Anónimos: aa.org    Comuníquese con un médico si:  No puede valentina los medicamentos zoya se lo carson indicado.  Migdalia síntomas empeoran o tiene síntomas de abstinencia cuando kendal de beber.  Vuelve a comenzar a beber (recaída) y los síntomas empeoran.    Solicite ayuda de inmediato si:  Tiene pensamientos acerca de lastimarse a usted mismo o a otras personas.    Si alguna vez siente que puede lastimarse o lastimar a otras personas, o tiene pensamientos de poner fin a mensah leo, busque ayuda de inmediato. Diríjase al departamento de emergencias más cercano o:     Comuníquese con el servicio de emergencias de mensah localidad (911 en los [place]).  Llame a addison línea de asistencia al suicida y atención en crisis zoya National Suicide Prevention Lifeline (Línea Nacional de Prevención del Suicidio) al 3-305-987-5025. Está disponible las 24 horas del día en los [place]  Envíe un mensaje de texto a la línea para casos de crisis al [id number] (en los [place]    Resumen  El trastorno por consumo de bebidas alcohólicas es addison afección en la que el consumo de alcohol altera la leo cotidiana. Las personas con esta afección consumen bebidas alcohólicas en exceso y no pueden controlar mensah consumo.  El tratamiento puede incluir desintoxicación, asesoramiento psicológico, medicamentos y grupos de apoyo.  Pida ayuda a amigos y familiares. Evite situaciones en las que se sirva alcohol.  Busque ayuda de inmediato si tiene pensamientos acerca de lastimarse a usted mismo o a otras personas.    NOTAS ADICIONALES E INSTRUCCIONES    Please follow up with your Primary MD in 24-48 hr.  Seek immediate medical care for any new/worsening signs or symptoms.     Document Released: 1/25/2006 Document Revised: 11/5/2020 Document Reviewed: 11/5/2020  Elsevier Interactive Patient Education ©2019 Elsevier Inc. This information is not intended to replace advice given to you by your health care provider. Make sure you discuss any questions you have with your health care provider.

## 2023-09-01 NOTE — ED PROVIDER NOTE - CLINICAL SUMMARY MEDICAL DECISION MAKING FREE TEXT BOX
Patient clinically sober ambulating no acute distress no signs of external trauma refusing alcohol rehab resources return to ED for intractable headache persistent vomiting new onset motor or sensory deficits

## 2023-09-01 NOTE — ED PROVIDER NOTE - OBJECTIVE STATEMENT
47 year old male with PMHx ETOH abuse comes to the ED inebriated, smelling like alcohol. Patient slurring his words but able to walk.

## 2023-09-04 ENCOUNTER — EMERGENCY (EMERGENCY)
Facility: HOSPITAL | Age: 48
LOS: 1 days | Discharge: HOME | End: 2023-09-04
Attending: EMERGENCY MEDICINE
Payer: MEDICAID

## 2023-09-04 VITALS
SYSTOLIC BLOOD PRESSURE: 133 MMHG | OXYGEN SATURATION: 95 % | WEIGHT: 134.92 LBS | HEART RATE: 81 BPM | RESPIRATION RATE: 17 BRPM | DIASTOLIC BLOOD PRESSURE: 76 MMHG | HEIGHT: 66 IN | TEMPERATURE: 98 F

## 2023-09-04 PROCEDURE — 99282 EMERGENCY DEPT VISIT SF MDM: CPT

## 2023-09-04 PROCEDURE — 99283 EMERGENCY DEPT VISIT LOW MDM: CPT
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
## 2023-09-04 NOTE — ED PROVIDER NOTE - OBJECTIVE STATEMENT
Patient presents to ED secondary to headache of 1 month duration intermittent nonradiating did not take Tylenol Motrin for the pain no new trauma no blood thinners no blurry vision.  No chest pain or shortness of breath no abdominal pain no nausea vomiting diarrhea no motor or sensory deficits.  patient mitts to some alcohol abuse today denies other drug abuse

## 2023-09-04 NOTE — ED PROVIDER NOTE - PATIENT PORTAL LINK FT
You can access the FollowMyHealth Patient Portal offered by [facility] by registering at the following website: [email or web address]. By joining [employer]’s FollowMyHealth portal, you will also be able to view your health information using other applications (apps) compatible with our system.

## 2023-09-04 NOTE — ED PROVIDER NOTE - NSFOLLOWUPINSTRUCTIONS_ED_ALL_ED_FT
Dr. Ralph Headache    A headache is pain or discomfort felt around the head or neck area. The specific cause of a headache may not be found as there are many types including tension headaches, migraine headaches, and cluster headaches. Watch your condition for any changes. Things you can do to manage your pain include taking over the counter and prescription medications as instructed by your health care provider, lying down in a dark quiet room, limiting stress, getting regular sleep, and refraining from alcohol and tobacco products.    SEEK IMMEDIATE MEDICAL CARE IF YOU HAVE ANY OF THE FOLLOWING SYMPTOMS: fever, vomiting, stiff neck, loss of vision, problems with speech, muscle weakness, loss of balance, trouble walking, passing out, or confusion.

## 2023-09-04 NOTE — ED PROVIDER NOTE - CLINICAL SUMMARY MEDICAL DECISION MAKING FREE TEXT BOX
patient no acute distress call from 711 walking in no acute distress advised acetaminophen ibuprofen for pain patient became verbally abusive to myself and nurses then refused Tylenol and ibuprofen security was asked to escort him

## 2023-09-04 NOTE — ED PROVIDER NOTE - GASTROINTESTINAL NEGATIVE STATEMENT, MLM
no abdominal pain, no bloating, no constipation, no diarrhea, no nausea and no vomiting. Spironolactone Counseling: Patient advised regarding risks of diarrhea, abdominal pain, hyperkalemia, birth defects (for female patients), liver toxicity and renal toxicity. The patient may need blood work to monitor liver and kidney function and potassium levels while on therapy. The patient verbalized understanding of the proper use and possible adverse effects of spironolactone.  All of the patient's questions and concerns were addressed.

## 2023-09-05 ENCOUNTER — EMERGENCY (EMERGENCY)
Facility: HOSPITAL | Age: 48
LOS: 1 days | Discharge: HOME | End: 2023-09-05
Attending: EMERGENCY MEDICINE
Payer: MEDICAID

## 2023-09-05 VITALS
WEIGHT: 134.92 LBS | HEART RATE: 90 BPM | SYSTOLIC BLOOD PRESSURE: 125 MMHG | RESPIRATION RATE: 18 BRPM | OXYGEN SATURATION: 98 % | HEIGHT: 66 IN | DIASTOLIC BLOOD PRESSURE: 83 MMHG | TEMPERATURE: 98 F

## 2023-09-05 LAB
ALBUMIN SERPL ELPH-MCNC: 4.4 G/DL — SIGNIFICANT CHANGE UP (ref 3.3–5.2)
ALP SERPL-CCNC: 316 U/L — HIGH (ref 40–120)
ALT FLD-CCNC: 71 U/L — HIGH
ANION GAP SERPL CALC-SCNC: 18 MMOL/L — HIGH (ref 5–17)
APAP SERPL-MCNC: <3 UG/ML — LOW (ref 10–26)
AST SERPL-CCNC: 170 U/L — HIGH
BASOPHILS # BLD AUTO: 0.14 K/UL — SIGNIFICANT CHANGE UP (ref 0–0.2)
BASOPHILS NFR BLD AUTO: 1.8 % — SIGNIFICANT CHANGE UP (ref 0–2)
BILIRUB SERPL-MCNC: 0.6 MG/DL — SIGNIFICANT CHANGE UP (ref 0.4–2)
BUN SERPL-MCNC: 5.6 MG/DL — LOW (ref 8–20)
CALCIUM SERPL-MCNC: 8.5 MG/DL — SIGNIFICANT CHANGE UP (ref 8.4–10.5)
CHLORIDE SERPL-SCNC: 98 MMOL/L — SIGNIFICANT CHANGE UP (ref 96–108)
CO2 SERPL-SCNC: 25 MMOL/L — SIGNIFICANT CHANGE UP (ref 22–29)
CREAT SERPL-MCNC: 0.45 MG/DL — LOW (ref 0.5–1.3)
EGFR: 131 ML/MIN/1.73M2 — SIGNIFICANT CHANGE UP
EOSINOPHIL # BLD AUTO: 0.22 K/UL — SIGNIFICANT CHANGE UP (ref 0–0.5)
EOSINOPHIL NFR BLD AUTO: 2.8 % — SIGNIFICANT CHANGE UP (ref 0–6)
ETHANOL SERPL-MCNC: 391 MG/DL — HIGH (ref 0–9)
GLUCOSE SERPL-MCNC: 106 MG/DL — HIGH (ref 70–99)
HCT VFR BLD CALC: 37.6 % — LOW (ref 39–50)
HGB BLD-MCNC: 13.6 G/DL — SIGNIFICANT CHANGE UP (ref 13–17)
IMM GRANULOCYTES NFR BLD AUTO: 0.4 % — SIGNIFICANT CHANGE UP (ref 0–0.9)
LYMPHOCYTES # BLD AUTO: 1.47 K/UL — SIGNIFICANT CHANGE UP (ref 1–3.3)
LYMPHOCYTES # BLD AUTO: 18.7 % — SIGNIFICANT CHANGE UP (ref 13–44)
MCHC RBC-ENTMCNC: 36.2 GM/DL — HIGH (ref 32–36)
MCHC RBC-ENTMCNC: 37.1 PG — HIGH (ref 27–34)
MCV RBC AUTO: 102.5 FL — HIGH (ref 80–100)
MONOCYTES # BLD AUTO: 0.63 K/UL — SIGNIFICANT CHANGE UP (ref 0–0.9)
MONOCYTES NFR BLD AUTO: 8 % — SIGNIFICANT CHANGE UP (ref 2–14)
NEUTROPHILS # BLD AUTO: 5.39 K/UL — SIGNIFICANT CHANGE UP (ref 1.8–7.4)
NEUTROPHILS NFR BLD AUTO: 68.3 % — SIGNIFICANT CHANGE UP (ref 43–77)
PLATELET # BLD AUTO: 259 K/UL — SIGNIFICANT CHANGE UP (ref 150–400)
POTASSIUM SERPL-MCNC: 3.7 MMOL/L — SIGNIFICANT CHANGE UP (ref 3.5–5.3)
POTASSIUM SERPL-SCNC: 3.7 MMOL/L — SIGNIFICANT CHANGE UP (ref 3.5–5.3)
PROT SERPL-MCNC: 7.9 G/DL — SIGNIFICANT CHANGE UP (ref 6.6–8.7)
RBC # BLD: 3.67 M/UL — LOW (ref 4.2–5.8)
RBC # FLD: 12.5 % — SIGNIFICANT CHANGE UP (ref 10.3–14.5)
SALICYLATES SERPL-MCNC: <0.6 MG/DL — LOW (ref 10–20)
SODIUM SERPL-SCNC: 141 MMOL/L — SIGNIFICANT CHANGE UP (ref 135–145)
WBC # BLD: 7.88 K/UL — SIGNIFICANT CHANGE UP (ref 3.8–10.5)
WBC # FLD AUTO: 7.88 K/UL — SIGNIFICANT CHANGE UP (ref 3.8–10.5)

## 2023-09-05 PROCEDURE — 70450 CT HEAD/BRAIN W/O DYE: CPT | Mod: 26,MG

## 2023-09-05 PROCEDURE — 93010 ELECTROCARDIOGRAM REPORT: CPT

## 2023-09-05 PROCEDURE — G1004: CPT

## 2023-09-05 PROCEDURE — 99284 EMERGENCY DEPT VISIT MOD MDM: CPT

## 2023-09-05 NOTE — ED ADULT NURSE REASSESSMENT NOTE - NS ED NURSE REASSESS COMMENT FT1
assumed care of pt from previous rn. pt resting comfortably, breathing even and unlabored with no complaints at this time.

## 2023-09-05 NOTE — ED PROVIDER NOTE - PATIENT PORTAL LINK FT
You can access the FollowMyHealth Patient Portal offered by [facility] by registering at the following website: [email or web address]. By joining [employer]’s FollowMyHealth portal, you will also be able to view your health information using other applications (apps) compatible with our system.

## 2023-09-05 NOTE — ED PROVIDER NOTE - EYES, MLM
Clear bilaterally, pupils equal, round and reactive to light. [Follow-Up] : a follow-up evaluation of [FreeTextEntry2] : IUD insertion; PCOS

## 2023-09-05 NOTE — ED ADULT TRIAGE NOTE - CHIEF COMPLAINT QUOTE
Pt BIBA c/o SI.  Pt appears intoxicated.  Admits to drinking alcohol.  Denies drug use.  Pt admits to wanting to hurt himself.  Denies plan.  Pt placed in yellow gown.  Belongings collected, labeled and secured.

## 2023-09-05 NOTE — ED PROVIDER NOTE - PROGRESS NOTE DETAILS
Sascha: reassessed, looks well; interviewed with Yoli, ; feeling well; clinically sober; denies ever stating thoughts of self harm; states he has no thoughts of self harm and is requesting d/c; patient ambulatory without assistance; ok for d/c

## 2023-09-05 NOTE — ED PROVIDER NOTE - OBJECTIVE STATEMENT
Patient is a 47-year-old male with history of alcohol abuse presenting with alcohol intoxication and suicidality.  patient admits to drinking alcohol today and states he wants to kill himself.  No actual plan.  Patient states he hit his head and is having a headache.  Denies any vomiting, focal weakness or numbness.  Rest of history limited secondary to patient's intoxication.  Per chart review patient has been seen in this emergency department frequently for alcohol intoxication but not usually associated with SI

## 2023-09-05 NOTE — ED PROVIDER NOTE - CLINICAL SUMMARY MEDICAL DECISION MAKING FREE TEXT BOX
patient presenting with alcohol intoxication and suicidal thoughts.  Will obtain labs, BH evaluation, CT head given reported history of head trauma though no obvious trauma noted on examination

## 2023-09-05 NOTE — ED PROVIDER NOTE - NSFOLLOWUPINSTRUCTIONS_ED_ALL_ED_FT
Floor Alcohol Abuse    Alcohol intoxication occurs when the amount of alcohol that a person has consumed impairs his or her ability to mentally and physically function. Chronic alcohol consumption can also lead to a variety of health issues including neurological disease, stomach disease, heart disease, liver disease, etc. Do not drive after drinking alcohol. Drinking enough alcohol to end up in an Emergency Room suggests you may have an alcohol abuse problem. Seek help at a drug addiction center.    SEEK IMMEDIATE MEDICAL CARE IF YOU HAVE ANY OF THE FOLLOWING SYMPTOMS: seizures, vomiting blood, blood in your stool, lightheadedness/dizziness, or becoming shaky to tremulous when you stop drinking.

## 2023-09-05 NOTE — ED ADULT NURSE NOTE - OBJECTIVE STATEMENT
Assumed pt care at 1615.  Received in yellow gown with 1:1 aide at bedside.  Pt stated "I'm fine, I need to catch the bus."  When asked about his suicidal statement, pt stated "I said something stupid."  Verbalized understanding of need to wait for lab results and MD reevaluation most likely with psych before he can be discharged.

## 2023-09-06 VITALS
DIASTOLIC BLOOD PRESSURE: 70 MMHG | OXYGEN SATURATION: 99 % | SYSTOLIC BLOOD PRESSURE: 120 MMHG | RESPIRATION RATE: 18 BRPM | HEART RATE: 90 BPM

## 2023-09-06 PROCEDURE — 36415 COLL VENOUS BLD VENIPUNCTURE: CPT

## 2023-09-06 PROCEDURE — 99285 EMERGENCY DEPT VISIT HI MDM: CPT | Mod: 25

## 2023-09-06 PROCEDURE — T1013: CPT

## 2023-09-06 PROCEDURE — 80307 DRUG TEST PRSMV CHEM ANLYZR: CPT

## 2023-09-06 PROCEDURE — 70450 CT HEAD/BRAIN W/O DYE: CPT | Mod: MG

## 2023-09-06 PROCEDURE — G1004: CPT

## 2023-09-06 PROCEDURE — 85025 COMPLETE CBC W/AUTO DIFF WBC: CPT

## 2023-09-06 PROCEDURE — 80053 COMPREHEN METABOLIC PANEL: CPT

## 2023-09-06 PROCEDURE — 93005 ELECTROCARDIOGRAM TRACING: CPT

## 2023-09-06 RX ORDER — ACETAMINOPHEN 500 MG
650 TABLET ORAL ONCE
Refills: 0 | Status: COMPLETED | OUTPATIENT
Start: 2023-09-06 | End: 2023-09-06

## 2023-09-06 RX ADMIN — Medication 650 MILLIGRAM(S): at 01:47

## 2023-09-08 ENCOUNTER — EMERGENCY (EMERGENCY)
Facility: HOSPITAL | Age: 48
LOS: 1 days | Discharge: HOME | End: 2023-09-08
Attending: STUDENT IN AN ORGANIZED HEALTH CARE EDUCATION/TRAINING PROGRAM
Payer: MEDICAID

## 2023-09-08 VITALS
HEIGHT: 66 IN | TEMPERATURE: 98 F | HEART RATE: 78 BPM | DIASTOLIC BLOOD PRESSURE: 78 MMHG | SYSTOLIC BLOOD PRESSURE: 113 MMHG | RESPIRATION RATE: 16 BRPM | OXYGEN SATURATION: 98 %

## 2023-09-08 PROCEDURE — 99285 EMERGENCY DEPT VISIT HI MDM: CPT

## 2023-09-08 PROCEDURE — 99283 EMERGENCY DEPT VISIT LOW MDM: CPT

## 2023-09-08 NOTE — ED PROVIDER NOTE - ATTENDING CONTRIBUTION TO CARE
48y M w/ hx ETOH abuse BIBEMS for intoxication. Pt admits to drinking; denies any new complaints. Has old posterior scalp hematoma on exam; no other signs of trauma. AAOx3. Will monitor for sobriety.

## 2023-09-08 NOTE — ED PROVIDER NOTE - PATIENT PORTAL LINK FT
You can access the FollowMyHealth Patient Portal offered by [facility] by registering at the following website: [email or web address]. By joining [employer]’s FollowMyHealth portal, you will also be able to view your health information using other applications (apps) compatible with our system.

## 2023-09-08 NOTE — ED PROVIDER NOTE - OBJECTIVE STATEMENT
48 year old male with PMHx ETOH abuse BIBA after found intoxicated in the street. Patient admits to drinking alcohol today. Appears inebriated. No acute complaints at this time. Denies any pain. Poor historian. States he wants to sleep.

## 2023-09-08 NOTE — ED PROVIDER NOTE - PROGRESS NOTE DETAILS
Yessenia Mercedes, DO: Patient awake, alert, oriented. Tolerated 2 cups of apple juice, is listening to music on his phone. Steady gait. Clinically sober. Stable for dc home. Patient agrees.

## 2023-09-08 NOTE — ED PROVIDER NOTE - CLINICAL SUMMARY MEDICAL DECISION MAKING FREE TEXT BOX
48 year old male BIBA intoxicated. Patient hemodynamically stable, protecting airway, no obvious signs of new trauma. Will observe for clinical sobriety.

## 2023-09-08 NOTE — ED PROVIDER NOTE - PHYSICAL EXAMINATION
Gen: well nourished male, appears inebriated  Head: normocephalic, old appearing bruise to posterior scalp, nontender eto palpation   EENT: EOMI, no scleral icterus  Lung: no increased work of breathing, speaking in full sentences  CV: regular rate, regular rhythm  Abd: soft, non-tender, non-distended  MSK: No edema, no visible deformities, moving all extremities equally  Neuro: Awake, appears inebriated

## 2023-09-09 VITALS
TEMPERATURE: 98 F | OXYGEN SATURATION: 99 % | HEART RATE: 82 BPM | DIASTOLIC BLOOD PRESSURE: 71 MMHG | RESPIRATION RATE: 17 BRPM | SYSTOLIC BLOOD PRESSURE: 110 MMHG

## 2023-09-10 ENCOUNTER — EMERGENCY (EMERGENCY)
Facility: HOSPITAL | Age: 48
LOS: 1 days | Discharge: HOME | End: 2023-09-10
Attending: STUDENT IN AN ORGANIZED HEALTH CARE EDUCATION/TRAINING PROGRAM
Payer: MEDICAID

## 2023-09-10 VITALS
HEART RATE: 81 BPM | RESPIRATION RATE: 16 BRPM | SYSTOLIC BLOOD PRESSURE: 135 MMHG | DIASTOLIC BLOOD PRESSURE: 92 MMHG | HEIGHT: 66 IN | OXYGEN SATURATION: 97 % | TEMPERATURE: 98 F

## 2023-09-10 PROCEDURE — 99285 EMERGENCY DEPT VISIT HI MDM: CPT

## 2023-09-10 PROCEDURE — 82962 GLUCOSE BLOOD TEST: CPT

## 2023-09-10 PROCEDURE — 99284 EMERGENCY DEPT VISIT MOD MDM: CPT

## 2023-09-10 RX ORDER — OLANZAPINE 15 MG/1
5 TABLET, FILM COATED ORAL ONCE
Refills: 0 | Status: DISCONTINUED | OUTPATIENT
Start: 2023-09-10 | End: 2023-09-10

## 2023-09-10 NOTE — ED ADULT NURSE NOTE - EXTENSIONS OF SELF_ADULT
None Finasteride Pregnancy And Lactation Text: This medication is absolutely contraindicated during pregnancy. It is unknown if it is excreted in breast milk.

## 2023-09-10 NOTE — ED ADULT NURSE NOTE - NSFALLUNIVINTERV_ED_ALL_ED
Bed/Stretcher in lowest position, wheels locked, appropriate side rails in place/Call bell, personal items and telephone in reach/Instruct patient to call for assistance before getting out of bed/chair/stretcher/Non-slip footwear applied when patient is off stretcher/[place] to call system/Physically safe environment - no spills, clutter or unnecessary equipment/Purposeful proactive rounding/Room/bathroom lighting operational, light cord in reach

## 2023-09-11 VITALS
RESPIRATION RATE: 18 BRPM | TEMPERATURE: 98 F | DIASTOLIC BLOOD PRESSURE: 65 MMHG | SYSTOLIC BLOOD PRESSURE: 109 MMHG | OXYGEN SATURATION: 95 % | HEART RATE: 80 BPM

## 2023-09-11 NOTE — ED PROVIDER NOTE - CLINICAL SUMMARY MEDICAL DECISION MAKING FREE TEXT BOX
pt well known to this ED for frequent visits for alcohol intoxication presents to the ED for alcohol intoxication, no obvious signs of trauma, no acute medical complaints, will observe patient in the ED for clinical sobriety

## 2023-09-11 NOTE — ED PROVIDER NOTE - PHYSICAL EXAMINATION
Gen: awake, alert, in no obvious distress  Head: NC/AT  Eyes: Clear bilaterally  ENT: Airway patent, trachea midline  Card: regular rate and rhythm  Resp:  CTAB  Abd: soft, non-tender, non-distended  Ext: no gross deformities  Skin: warm, dry and intact  Neuro: strength, motor and sensation grossly intact

## 2023-09-11 NOTE — ED PROVIDER NOTE - PATIENT PORTAL LINK FT
You can access the FollowMyHealth Patient Portal offered by [facility] by registering at the following website: [email or web address]. By joining [employer]’s FollowMyHealth portal, you will also be able to view your health information using other applications (apps) compatible with our system.

## 2023-09-11 NOTE — ED PROVIDER NOTE - OBJECTIVE STATEMENT
49 y/o male with PMHx of alcohol abuse well known to this ED for frequent visits for alcohol intoxication presents to the ED for alcohol intoxication via EMS. Pt without any obvious signs of trauma, offers no acute complaints. Numerous visits for similar. No fever, no chills, no chest pain, no SOB, no N/V/D.

## 2023-09-11 NOTE — ED PROVIDER NOTE - NSFOLLOWUPINSTRUCTIONS_ED_ALL_ED_FT
Trastorno por consumo de bebidas alcohólicas  Alcohol Use Disorder    El trastorno por consumo de bebidas alcohólicas ocurre cuando el consumo de alcohol altera espino leo cotidiana. Las personas que padecen esta afección beben demasiado alcohol y no pueden controlar el consumo.    Dejah trastorno puede causar problemas graves en la jenny física. Puede afectar el cerebro, el corazón, el hígado, el páncreas, el sistema inmunitario, el estómago y los intestinos. El trastorno por consumo de bebidas alcohólicas puede aumentar espino riesgo de contraer ciertos tipos de cáncer y causar problemas con la jenny mental, tales zoya depresión, ansiedad, psicosis, delirios y demencia. Las personas que tienen dejah trastorno corren el riesgo de lastimarse a ellas mismas o a otras personas.    ¿Cuáles son las causas?  Esta afección se debe al consumo excesivo de alcohol con el transcurso del tiempo. No es causado por consumir demasiado alcohol solo addison o dos veces. Algunas personas que sufren esta afección beben alcohol para enfrentarse a situaciones negativas de la leo o escapar de ellas. Otros beben para aliviar el dolor o los síntomas de addison enfermedad mental.    ¿Qué incrementa el riesgo?  Es más probable que desarrolle esta afección si:    Tiene antecedentes familiares de trastorno por consumo de bebidas alcohólicas.  Espino cultura alienta el consumo de alcohol al punto de la intoxicación o facilita el acceso al alcohol.  Tuvo un trastorno emocional o de conducta en la infancia.  Fue víctima de abuso.  Es adolescente y:  Tiene calificaciones bajas o dificultades en la escuela.  Migdalia cuidadores no le hablan sobre negarse a valentina alcohol ni supervisan migdalia actividades.  Es impulsivo o tiene problemas con el autocontrol.    ¿Cuáles son los signos o los síntomas?  Los síntomas de esta afección incluyen los siguientes:    Beber más de lo que desea.  Beber por más tiempo del que desea.  Intentar varias veces beber menos o controlar el consumo de alcohol.  Invertir mucho tiempo en conseguir alcohol, beber o recuperarse de andrew bebido.  Sentir addison necesidad imperiosa de beber alcohol.  Tener problemas en el trabajo, la escuela o el hogar debido al consumo de alcohol.  Tener problemas en las relaciones debido al consumo de alcohol.  Beber cuando es peligroso hacerlo, por ejemplo, antes de conducir.  Continuar bebiendo incluso sabiendo que podría tener un problema físico o mental relacionado con el consumo de alcohol.  Necesitar cada vez más alcohol para obtener el mismo efecto que desea de dejah (generar tolerancia).  Tener síntomas de abstinencia al dejar de beber. Entre los síntomas de abstinencia se incluyen los siguientes:  Fatiga.  Pesadillas.  Dificultad para dormir.  Depresión.  Ansiedad.  Fiebre.  Convulsiones.  Confusión grave.  Chet o sentir cosas que no existen (alucinaciones).  Temblores.  Frecuencia cardíaca acelerada.  Respiración rápida.  Hipertensión arterial.  Consumir para evitar los síntomas de abstinencia.    ¿Cómo se diagnostica?  Esta afección se diagnostica con addison evaluación. El médico puede comenzar la evaluación con jose juan o cuatro preguntas sobre espino consumo de alcohol.    El médico podrá realizar un examen físico o análisis de laboratorio para determinar si tiene problemas físicos zoya resultado del consumo de alcohol. Puede derivarlo a un profesional de jenny mental para que realice addison evaluación.    ¿Cómo se trata?  Algunas personas con trastorno por consumo de bebidas alcohólicas pueden reducir el consumo a niveles de bajo riesgo. Otras necesitan dejar el alcohol por completo. Cuando sea necesario, puede recibir ayuda de profesionales de jenny mental capacitados en el tratamiento del abuso de sustancias. El médico puede ayudarlo a determinar la gravedad de espino trastorno por consumo de bebidas alcohólicas y el tipo de tratamiento que necesita. Las formas de tratamiento disponibles son:    Desintoxicación. La desintoxicación implica dejar de beber y valentina medicamentos recetados en el plazo de la primera semana para reducir los síntomas de la abstinencia. Dejah tratamiento es importante para las personas que ya carson tenido síntomas de abstinencia y para los que consumen en exceso, quienes probablemente sufran síntomas de abstinencia. La abstinencia puede ser peligrosa y, en casos graves, puede causar la muerte. La desintoxicación puede realizarse en el hogar, en un ámbito extrahospitalario, en un centro de atención primaria, en un hospital o en un centro de tratamiento para abuso de sustancias.  Asesoramiento psicológico. Dejah tratamiento también se conoce zoya psicoterapia. La realizan terapeutas especializados en el tratamiento de pacientes que abusan de sustancias. Un terapeuta puede abordar los motivos por los que consume alcohol y sugerirle maneras de evitar que vuelva a beber o que tenga un problema con la bebida. Los objetivos de la psicoterapia son los siguientes:  Encontrar actividades saludables y formas de afrontar el estrés.  Identificar y evitar aquello que lo conduzca a beber alcohol.  Aprender a controlar las ansias de beber.  Medicamentos. Los medicamentos pueden ayudar a tratar el trastorno por consumo de bebidas alcohólicas porque:  Controlan las ansias de beber.  Reducen el sentimiento positivo que experimenta al beber alcohol.  Causan addison reacción física desagradable cuando emily alcohol (terapia de aversión).  Grupos de apoyo. Los grupos de apoyo son dirigidos por personas que carson superado espino problema con la bebida. Brindan apoyo emocional, consejos y orientación.    Estas formas de tratamiento, generalmente, se combinan. Algunas personas con esta afección se benefician del tratamiento combinado que se ofrece en algunos centros de tratamiento especializados en el abuso de sustancias.     Siga estas indicaciones en espino casa:  [place] los medicamentos de venta tatyana y los recetados solamente zoya se lo haya indicado el médico.  Consulte al médico antes de empezar cualquier medicamento nuevo.  Pida a familiares y amigos que no le ofrezcan alcohol.  Evite situaciones en las que se sirva alcohol, incluidas las reuniones en las que otros estén bebiendo alcohol.  Elabore un plan de acción si siente la tentación de beber alcohol.  Busque pasatiempos o actividades que disfrute, kim que no incluyan el consumo de alcohol.  Concurra a todas las visitas de control zoya se lo haya indicado el médico. [place] es importante.    ¿Cómo se nazario?  Si emily, limite el consumo de alcohol a no más de 1 medida por día si es aristeo y no está embarazada, y a 2 medidas si es hombre. Addison medida equivale a 12 oz (355 ml) de cerveza, 5 oz (148 ml) de vino o 1½ oz (44 ml) de bebidas alcohólicas de daya graduación.  Si tiene addison afección de jenny mental, debe buscar tratamiento y apoyo.  No ofrezca alcohol a adolescentes.  Si es adolescente:  No david alcohol.  No tenga miedo de negarse si alguien le ofrece alcohol. Diga en voz daya por qué no quiere beber alcohol. Puede ser un modelo a seguir positivo para migdalia amigos y un buen ejemplo para las personas que lo rodean al no consumir alcohol.  Si migdalia amigos beben alcohol, pase más tiempo con quienes no lo tyrone. Laura nuevas amistades que no consuman alcohol.  Busque maneras saludables de controlar el estrés y las emociones, erma zoya meditar, respirar profundo, hacer actividad física, pasar tiempo en la naturaleza, escuchar música o hablar con un amigo o un familiar confiable.    Comuníquese con un médico si:  No puede valentina los medicamentos zoya se lo carson indicado.  Los síntomas empeoran.  Vuelve a consumir alcohol (recaída) y migdalia síntomas empeoran.    Solicite ayuda de inmediato si:  Tiene pensamientos acerca de lastimarse a usted mismo o a otras personas.    Si alguna vez siente que puede lastimarse a usted mismo o a los demás, o piensa en poner fin a espino leo, busque ayuda de inmediato. Puede dirigirse al servicio de urgencias más cercano o comunicarse con:    Espino servicio de emergencias local (911 en los [place]).  Addison línea de asistencia al suicida y atención en crisis, zoya la Línea Nacional de Prevención del Suicidio (National Suicide Prevention Lifeline) al 1-875.115.1383. Está disponible las 24 horas del día.    Resumen  El trastorno por consumo de bebidas alcohólicas ocurre cuando el consumo de alcohol altera espino leo cotidiana. Las personas que padecen esta afección beben demasiado alcohol y no pueden controlar el consumo.  El tratamiento puede incluir desintoxicación, asesoramiento psicológico, medicamentos y grupos de apoyo.  Pida a familiares y amigos que no le ofrezcan alcohol. Evite situaciones en las que se sirva alcohol.  Busque ayuda de inmediato si tiene pensamientos acerca de lastimarse a usted mismo o a otras personas.    Pare de valentina tanto alcol.  Por favor tenga seguimiento con espino doctor primario entre 2 webb.  Regrese a urgencias por cualquier preocupacion medica.

## 2023-09-14 ENCOUNTER — EMERGENCY (EMERGENCY)
Facility: HOSPITAL | Age: 48
LOS: 1 days | Discharge: HOME | End: 2023-09-14
Attending: STUDENT IN AN ORGANIZED HEALTH CARE EDUCATION/TRAINING PROGRAM
Payer: MEDICAID

## 2023-09-14 VITALS
HEIGHT: 66 IN | RESPIRATION RATE: 20 BRPM | HEART RATE: 83 BPM | DIASTOLIC BLOOD PRESSURE: 89 MMHG | WEIGHT: 130.07 LBS | SYSTOLIC BLOOD PRESSURE: 137 MMHG | TEMPERATURE: 98 F | OXYGEN SATURATION: 95 %

## 2023-09-14 PROCEDURE — 99285 EMERGENCY DEPT VISIT HI MDM: CPT

## 2023-09-14 PROCEDURE — 93010 ELECTROCARDIOGRAM REPORT: CPT

## 2023-09-14 PROCEDURE — 93005 ELECTROCARDIOGRAM TRACING: CPT

## 2023-09-14 PROCEDURE — 82962 GLUCOSE BLOOD TEST: CPT

## 2023-09-14 PROCEDURE — 99283 EMERGENCY DEPT VISIT LOW MDM: CPT

## 2023-09-14 NOTE — ED PROVIDER NOTE - NSFOLLOWUPINSTRUCTIONS_ED_ALL_ED_FT
You were seen and evaluated emergency department for your symptoms.  Please refrain from alcohol and drug use as this may be detrimental to your health.    Please return to the emergency department for any new or concerning symptoms including but not limited to fever, chills, tremors, headache, abdominal pain, chest pain, difficulty breathing

## 2023-09-14 NOTE — ED ADULT TRIAGE NOTE - CHIEF COMPLAINT QUOTE
[place] stating he does not feel well.  Pt appears intoxicated.  States last drink was approx  3 or 4 oclock.  Pt placed in yellow gown.  Belongings collected, labeled and secured.

## 2023-09-14 NOTE — ED PROVIDER NOTE - CLINICAL SUMMARY MEDICAL DECISION MAKING FREE TEXT BOX
HPI: 49 y/o male with PMHx of alcohol abuse well known to this ED for frequent visits for alcohol intoxication presents to the ED for alcohol intoxication via EMS.  Patient endorses drinking excessive alcohol today.  Denies any trauma.  States he is hungry and tired.  No other current complaints at this time.    ROS:   General: No fever, no chills, no malaise, no fatigue  Respiratory: No cough, no dyspnea, no pleuritic chest pain  Cardiac: no chest pain, no palpitations, no edema, no jvd  Abdomen: No abdominal pain, no nausea, no vomiting, no diarrhea  Musculoskeletal: No myalgia, no arthralgia  Neurologic: No headache  Skin: No rash, no evidence of trauma  All other ROS are negative    PE:  General: Awake and alert, clinically intoxicated  Head: NC/AT  Eyes: PERRL, EOMI  ENT: Airway patent, mmm  Pulmonary: CTA b/l, symmetric breath sounds. No W/R/R.  Cardiac: s1s2, RRR, no M,G,R, No JVD  Abdomen: +BS, ND, NT, soft, no guarding, no rebound, no masses , no rigidity  Extremities: FROM, symmetric pulses  Skin: no rash or bruising, no evidence of trauma    MDM: 49 y/o male with PMHx of alcohol abuse well known to this ED for frequent visits for alcohol intoxication presents to the ED for alcohol intoxication via EMS.  Patient endorses drinking excessive alcohol today.  No evidence of trauma.  Will monitor for sobriety and reassess.

## 2023-09-14 NOTE — ED PROVIDER NOTE - PROGRESS NOTE DETAILS
Patient reevaluated, clinically sober, with significant improvement in mental status from condition on arrival.  Appears to be at baseline with no concerning findings on repeat physical exam, vitals on discharge all WNL. Pt awake, ambulating without difficulty, non-ataxic, shows good judgment and appears to have capacity at current time, agreeable with discharge.  Pt counseled regarding detrimental effects of acute and chronic alcohol use, and was offered detox - pt politely declines after extensive discussion.  Encouraged to progressively decrease alcohol intake without just stopping abruptly and seek help in a program that will assist them in maintaining future abstinence from alcohol use. Pt states has safe means of transportation and a safe place to go. Precautions discussed at length with return demonstration of understanding. Encouraged to return if any issues, concerns, or willingness to complete detox.

## 2023-09-14 NOTE — ED PROVIDER NOTE - PATIENT PORTAL LINK FT
You can access the FollowMyHealth Patient Portal offered by [facility] by registering at the following website: [email or web address]. By joining [employer]’s FollowMyHealth portal, you will also be able to view your health information using other applications (apps) compatible with our system.

## 2023-09-15 ENCOUNTER — EMERGENCY (EMERGENCY)
Facility: HOSPITAL | Age: 48
LOS: 1 days | Discharge: HOME | End: 2023-09-15
Attending: EMERGENCY MEDICINE
Payer: MEDICAID

## 2023-09-15 VITALS
HEIGHT: 66 IN | TEMPERATURE: 97 F | DIASTOLIC BLOOD PRESSURE: 81 MMHG | HEART RATE: 71 BPM | OXYGEN SATURATION: 99 % | SYSTOLIC BLOOD PRESSURE: 124 MMHG | RESPIRATION RATE: 18 BRPM

## 2023-09-15 PROCEDURE — 99285 EMERGENCY DEPT VISIT HI MDM: CPT

## 2023-09-15 PROCEDURE — 99283 EMERGENCY DEPT VISIT LOW MDM: CPT

## 2023-09-15 NOTE — ED PROVIDER NOTE - PHYSICAL EXAMINATION
Gen: NAD, appears intoxicated, fairly conversational  Eyes: PERRLA, EOMI   HENT: Normocephalic, atraumatic. External ears normal, no rhinorrhea, tacky mucous membranes.   CV: RRR, no M/R/G  Resp: CTAB, non-labored, speaking without difficulty on room air  Abd: soft, non tender, non rigid, no guarding or rebound tenderness  Back: No CVAT bilaterally, no midline ttp  Skin: dry, wwp   Neuro: AOx3, speech is slurred but appropriate to questions  Psych: Mood ok, affect euthymic

## 2023-09-15 NOTE — ED PROVIDER NOTE - PATIENT PORTAL LINK FT
You can access the FollowMyHealth Patient Portal offered by [facility] by registering at the following website: [email or web address]. By joining [employer]’s FollowMyHealth portal, you will also be able to view your health information using other applications (apps) compatible with our system.

## 2023-09-15 NOTE — ED ADULT NURSE NOTE - OBJECTIVE STATEMENT
patient claims he was just buying some food in [facility] and someone called an ambulance on him being intoxicated. patient denied any symptoms and just wants to leave.

## 2023-09-15 NOTE — ED PROVIDER NOTE - PROGRESS NOTE DETAILS
Katalina Toscano DO: Patient signed out to me by Dr. Abarca.  Pending clinical sobriety.  Patient is now awake, alert, in no acute distress.  Oriented x4.  Ambulatory with steady gait and clinically sober.

## 2023-09-15 NOTE — ED ADULT TRIAGE NOTE - CHIEF COMPLAINT QUOTE
Pt states he was drinking alcohol at home and then went to [facility] where someone called EMS due to pt being intoxicated. Pt states he does not want to be here.

## 2023-09-15 NOTE — ED PROVIDER NOTE - OBJECTIVE STATEMENT
Hx of alcoholism, BIBEMS for intoxication at a public place; offers no complaint to me while here, wants to sleep. States he was going to leave but doesn't have a ride.     Update: reassesssed @ 7:05PM, now sleeping.
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
## 2023-09-15 NOTE — ED PROVIDER NOTE - CLINICAL SUMMARY MEDICAL DECISION MAKING FREE TEXT BOX
48M presenting for public intox, hx of alcoholism; states he does not want to be here but does not have a way of getting home currently. Discussed he seems quite intoxicated and that he could go to sleep and rediscuss when he wakes; is agreeable to this, on reassessment was sleeping comfortably. Will allow to sleep off his intoxication, reassess when clinically sober.

## 2023-09-15 NOTE — ED ADULT NURSE REASSESSMENT NOTE - NS ED NURSE REASSESS COMMENT FT1
Assumed care for pt at 1930. Pt is resting in stretcher. Pt is able to be aroused by light stimulation. Pt breathing is equal and unlabored. Pt bed is locked and in lowest position with side rails up.

## 2023-09-16 ENCOUNTER — EMERGENCY (EMERGENCY)
Facility: HOSPITAL | Age: 48
LOS: 1 days | Discharge: HOME | End: 2023-09-16
Attending: STUDENT IN AN ORGANIZED HEALTH CARE EDUCATION/TRAINING PROGRAM
Payer: MEDICAID

## 2023-09-16 VITALS
HEART RATE: 89 BPM | DIASTOLIC BLOOD PRESSURE: 75 MMHG | TEMPERATURE: 98 F | RESPIRATION RATE: 18 BRPM | OXYGEN SATURATION: 100 % | SYSTOLIC BLOOD PRESSURE: 117 MMHG

## 2023-09-16 VITALS
WEIGHT: 139.99 LBS | DIASTOLIC BLOOD PRESSURE: 96 MMHG | TEMPERATURE: 98 F | HEART RATE: 66 BPM | HEIGHT: 66 IN | OXYGEN SATURATION: 97 % | RESPIRATION RATE: 18 BRPM | SYSTOLIC BLOOD PRESSURE: 146 MMHG

## 2023-09-16 PROCEDURE — 71045 X-RAY EXAM CHEST 1 VIEW: CPT | Mod: 26

## 2023-09-16 PROCEDURE — 99285 EMERGENCY DEPT VISIT HI MDM: CPT

## 2023-09-16 PROCEDURE — 93005 ELECTROCARDIOGRAM TRACING: CPT

## 2023-09-16 PROCEDURE — G1004: CPT

## 2023-09-16 PROCEDURE — 93010 ELECTROCARDIOGRAM REPORT: CPT

## 2023-09-16 PROCEDURE — 70450 CT HEAD/BRAIN W/O DYE: CPT | Mod: MF

## 2023-09-16 PROCEDURE — 99285 EMERGENCY DEPT VISIT HI MDM: CPT | Mod: 25

## 2023-09-16 PROCEDURE — 71045 X-RAY EXAM CHEST 1 VIEW: CPT

## 2023-09-16 RX ORDER — ACETAMINOPHEN 500 MG
975 TABLET ORAL ONCE
Refills: 0 | Status: COMPLETED | OUTPATIENT
Start: 2023-09-16 | End: 2023-09-16

## 2023-09-16 RX ADMIN — Medication 975 MILLIGRAM(S): at 22:05

## 2023-09-16 NOTE — ED ADULT NURSE REASSESSMENT NOTE - NS ED NURSE REASSESS COMMENT FT1
Pt is resting comfortably In stretcher. Pt asked what time it is and requested water. Pt is waiting to sober up before being d/c. Pt able to be aroused by light stimulation. Pt breathing is equal and unlabored. Pt side rails are up and bed is locked in lowest position.

## 2023-09-16 NOTE — ED ADULT TRIAGE NOTE - CHIEF COMPLAINT QUOTE
pt c/o chest pain [id number] and admits to drinking etoh today, he fell the other day and c/o head pain pt c/o chest pain [id number] and admits to drinking etoh today, he fell the other day and c/o head pain.... pt changed into yellow gown

## 2023-09-16 NOTE — ED ADULT NURSE NOTE - OBJECTIVE STATEMENT
BIBEMS with complaints of headache, patient says he drank beer and fell and has a headache. Denies chest pain, denies sob. RR even and unlabored. patient is placed in yellow gown. EKG completed

## 2023-09-16 NOTE — ED PROVIDER NOTE - OBJECTIVE STATEMENT
48y M w/ hx ETOH abuse presents for intoxication. Pt is well known to this ED for frequent similar visits, and was just seen yesterday. Pt says he fell a few days ago and complains of head and chest pain.

## 2023-09-16 NOTE — ED ADULT NURSE NOTE - NSFALLRISKINTERV_ED_ALL_ED

## 2023-09-16 NOTE — ED PROVIDER NOTE - CLINICAL SUMMARY MEDICAL DECISION MAKING FREE TEXT BOX
48y M presents for alcohol intoxication, also complains of headache and chest pain after recent fall. Stable VS, no obvious signs of trauma on exam. Check CT head, CXR, EKG. Monitor for sobriety. 48y M presents for alcohol intoxication, also complains of headache and chest pain after recent fall. Stable VS, no obvious signs of trauma on exam. Check CT head, CXR, EKG. Monitor for sobriety.    On reassessment, pt AAOx4, ambulatory with steady gait and clinically sober. Stable for discharge.

## 2023-09-16 NOTE — ED PROVIDER NOTE - PATIENT PORTAL LINK FT
You can access the FollowMyHealth Patient Portal offered by [facility] by registering at the following website: [email or web address]. By joining [facility]’s FollowMyHealth portal, you will also be able to view your health information using other applications (apps) compatible with our system.

## 2023-09-16 NOTE — ED PROVIDER NOTE - NSFOLLOWUPINSTRUCTIONS_ED_ALL_ED_FT
- Follow up with your primary care doctor  - Take tylenol or ibuprofen as needed for pain  - Return to the emergency room for any new or worsening issues

## 2023-09-16 NOTE — ED ADULT NURSE NOTE - CHIEF COMPLAINT QUOTE
pt c/o chest pain [id number] and admits to drinking etoh today, he fell the other day and c/o head pain.... pt changed into yellow gown

## 2023-09-16 NOTE — ED PROVIDER NOTE - PHYSICAL EXAMINATION
Constitutional: Awake, alert, in no acute distress  Eyes: no scleral icterus  HENT: normocephalic, +mild tenderness posterior scalp, no obvious deformity, moist oral mucosa  Neck: supple, nontender  CV: RRR, no murmur, +left anterior chest wall tenderness, no crepitus  Pulm: non-labored respirations, CTAB  Abdomen: soft, non-tender, non-distended  Extremities: no edema, no deformity  Skin: no rash, no jaundice  Neuro: AAOx3, moving all extremities equally

## 2023-09-17 VITALS
RESPIRATION RATE: 20 BRPM | OXYGEN SATURATION: 97 % | SYSTOLIC BLOOD PRESSURE: 117 MMHG | TEMPERATURE: 98 F | HEART RATE: 72 BPM | DIASTOLIC BLOOD PRESSURE: 63 MMHG

## 2023-09-17 PROCEDURE — G1004: CPT

## 2023-09-17 PROCEDURE — 70450 CT HEAD/BRAIN W/O DYE: CPT | Mod: 26,MF

## 2023-09-24 ENCOUNTER — EMERGENCY (EMERGENCY)
Facility: HOSPITAL | Age: 48
LOS: 1 days | Discharge: HOME | End: 2023-09-24
Attending: STUDENT IN AN ORGANIZED HEALTH CARE EDUCATION/TRAINING PROGRAM
Payer: MEDICAID

## 2023-09-24 VITALS — WEIGHT: 130.07 LBS | HEIGHT: 66 IN

## 2023-09-24 LAB — ETHANOL SERPL-MCNC: 460 MG/DL — HIGH (ref 0–9)

## 2023-09-24 PROCEDURE — 99284 EMERGENCY DEPT VISIT MOD MDM: CPT

## 2023-09-24 PROCEDURE — 93010 ELECTROCARDIOGRAM REPORT: CPT

## 2023-09-24 NOTE — ED PROVIDER NOTE - CLINICAL SUMMARY MEDICAL DECISION MAKING FREE TEXT BOX
Lives alone and [place] the [place].  Patient is acutely intoxicated and will keep in the ED for sobriety.  We will check an alcohol

## 2023-09-24 NOTE — ED ADULT NURSE REASSESSMENT NOTE - NS ED NURSE REASSESS COMMENT FT1
pt care assumed at 0730, received report from COLEMAN Cedeño, respirations are even and unlabored on room air, no apparent distress noted at this time, charting as noted. Pt received A&Ox4 resting in bed comfortably at this time.

## 2023-09-24 NOTE — ED PROVIDER NOTE - PROGRESS NOTE DETAILS
Amador: Pt received in signout. Pt stable overnight, clinically sober and ambulatory with steady gait. Medically stable for discharge.

## 2023-09-24 NOTE — ED PROVIDER NOTE - PATIENT PORTAL LINK FT
You can access the FollowMyHealth Patient Portal offered by [facility] by registering at the following website: [email or web address]. By joining [employer]’s FollowMyHealth portal, you will also be able to view your health information using other applications (apps) compatible with our system.
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
## 2023-09-24 NOTE — ED ADULT NURSE REASSESSMENT NOTE - NS ED NURSE REASSESS COMMENT FT1
pt is awake, a&ox4, respirations even and unlabored on room air. pt given fluids and snacks, tolerating PO well.
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
## 2023-09-25 VITALS
HEART RATE: 96 BPM | OXYGEN SATURATION: 96 % | TEMPERATURE: 98 F | DIASTOLIC BLOOD PRESSURE: 49 MMHG | SYSTOLIC BLOOD PRESSURE: 101 MMHG | RESPIRATION RATE: 20 BRPM

## 2023-09-25 PROCEDURE — T1013: CPT

## 2023-09-25 PROCEDURE — 99285 EMERGENCY DEPT VISIT HI MDM: CPT | Mod: 25

## 2023-09-25 PROCEDURE — 36415 COLL VENOUS BLD VENIPUNCTURE: CPT

## 2023-09-25 PROCEDURE — 80307 DRUG TEST PRSMV CHEM ANLYZR: CPT

## 2023-09-25 PROCEDURE — 93005 ELECTROCARDIOGRAM TRACING: CPT

## 2023-09-25 PROCEDURE — 82962 GLUCOSE BLOOD TEST: CPT

## 2023-09-25 NOTE — ED ADULT NURSE REASSESSMENT NOTE - NS ED NURSE REASSESS COMMENT FT1
Pt is sleeping in bed comfortably at this time, respirations are even and unlabored on room air, no apparent distress noted at this time. pt safety maintained. Pt denies any complaints at this time. pt updated on plan of care, staying in ED until sobriety.

## 2023-10-01 ENCOUNTER — EMERGENCY (EMERGENCY)
Facility: HOSPITAL | Age: 48
LOS: 1 days | End: 2023-10-01
Payer: MEDICAID

## 2023-10-01 VITALS
RESPIRATION RATE: 20 BRPM | SYSTOLIC BLOOD PRESSURE: 141 MMHG | HEART RATE: 85 BPM | DIASTOLIC BLOOD PRESSURE: 91 MMHG | TEMPERATURE: 98 F | OXYGEN SATURATION: 95 % | HEIGHT: 66 IN

## 2023-10-01 PROCEDURE — L9991: CPT

## 2023-10-01 NOTE — ED ADULT TRIAGE NOTE - CHIEF COMPLAINT QUOTE
patient BIBA from home states he wanted to come to hospital for eval but denies any complaints in triage. requesting to leave.

## 2023-10-02 ENCOUNTER — EMERGENCY (EMERGENCY)
Facility: HOSPITAL | Age: 48
LOS: 1 days | Discharge: HOME | End: 2023-10-02
Attending: EMERGENCY MEDICINE
Payer: MEDICAID

## 2023-10-02 VITALS
HEIGHT: 66 IN | OXYGEN SATURATION: 96 % | SYSTOLIC BLOOD PRESSURE: 120 MMHG | TEMPERATURE: 98 F | HEART RATE: 89 BPM | RESPIRATION RATE: 18 BRPM | DIASTOLIC BLOOD PRESSURE: 76 MMHG

## 2023-10-02 PROCEDURE — 99282 EMERGENCY DEPT VISIT SF MDM: CPT

## 2023-10-02 PROCEDURE — 99283 EMERGENCY DEPT VISIT LOW MDM: CPT

## 2023-10-02 NOTE — ED PROVIDER NOTE - PATIENT PORTAL LINK FT
You can access the FollowMyHealth Patient Portal offered by [facility] by registering at the following website: [email or web address]. By joining [employer]’s FollowMyHealth portal, you will also be able to view your health information using other applications (apps) compatible with our system.

## 2023-10-02 NOTE — ED ADULT TRIAGE NOTE - CHIEF COMPLAINT QUOTE
Pt. BIBEMS intox. States "I drank 12 beers today". Pt. offers no complaints at this time. Resp. equal and unlabored b/l. VSS. NAD. MD at bedside.

## 2023-10-02 NOTE — ED PROVIDER NOTE - OBJECTIVE STATEMENT
pt presents to ED 2/2 etoh abuse no complaints . no SI no HI . denies fever. denies HA or neck pain. no chest pain or sob. no abd pain. no n/v/d. no urinary f/u/d. no back pain. no motor or sensory deficits. denies other  drug use.  no rash. no other acute issues symptoms or concerns

## 2023-10-04 NOTE — ED ADULT TRIAGE NOTE - CHIEF COMPLAINT QUOTE
Hi,   Patient would like NULYTELY sent to the [id number] [address]  Thanks,   
" I fell a while ago and my left leg hurts" pt denies falling today. admits to drinking alcohol picked up at the bus stop, pt undressed and placed in yellow gown

## 2023-10-06 ENCOUNTER — EMERGENCY (EMERGENCY)
Facility: HOSPITAL | Age: 48
LOS: 1 days | Discharge: HOME | End: 2023-10-06
Attending: EMERGENCY MEDICINE
Payer: MEDICAID

## 2023-10-06 VITALS
OXYGEN SATURATION: 99 % | RESPIRATION RATE: 72 BRPM | HEART RATE: 78 BPM | SYSTOLIC BLOOD PRESSURE: 127 MMHG | HEIGHT: 66 IN | TEMPERATURE: 98 F | DIASTOLIC BLOOD PRESSURE: 76 MMHG

## 2023-10-06 PROCEDURE — 99283 EMERGENCY DEPT VISIT LOW MDM: CPT

## 2023-10-06 NOTE — ED PROVIDER NOTE - PHYSICAL EXAMINATION
Gen: No acute distress, non toxic  HEENT: Mucous membranes moist, pink conjunctivae, EOMI. ncat  CV: RRR, nl s1/s2.  Resp: CTAB, normal rate and effort  GI: Abdomen soft, NT, ND. No rebound, no guarding  : No CVAT  Neuro: awake, alert, slurring speech.   MSK: No spine or joint tenderness to palpation  Skin: No rashes. intact and perfused.

## 2023-10-06 NOTE — ED PROVIDER NOTE - NSFOLLOWUPINSTRUCTIONS_ED_ALL_ED_FT
Abuso de alcohol    La intoxicación por alcohol ocurre cuando la cantidad de alcohol que addison persona ha consumido afecta espino capacidad para funcionar mental y físicamente. El consumo crónico de alcohol también puede provocar addison variedad de problemas de jenny, zoya enfermedades neurológicas, enfermedades del estómago, enfermedades del corazón, enfermedades del hígado, etc. No conduzca después de beber alcohol. Beber suficiente alcohol para terminar en addison rene de emergencias sugiere que puede tener un problema de abuso de alcohol. Busque ayuda en un centro de adicción a las drogas.    BUSQUE ATENCIÓN MÉDICA INMEDIATA SI TIENE ALGUNO DE LOS SIGUIENTES SÍNTOMAS: convulsiones, vómitos con sandra, sandra en las heces, aturdimiento / mareos, o temblores o temblores cuando kendal de beber.

## 2023-10-06 NOTE — CHART NOTE - NSCHARTNOTEFT_GEN_A_CORE
SWNote: p/c from pt's ED MD (Carmelina) pt requesting bus tickets , 1 bus ticket 1 transfer given . ED  ([facility]) requesting mcaid taxi for pt , mcaid inactive.

## 2023-10-06 NOTE — ED PROVIDER NOTE - PATIENT PORTAL LINK FT
You can access the FollowMyHealth Patient Portal offered by [facility] by registering at the following website: [email or web address]. By joining [employer]’s FollowMyHealth portal, you will also be able to view your health information using other applications (apps) compatible with our system.
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
## 2023-10-08 ENCOUNTER — EMERGENCY (EMERGENCY)
Facility: HOSPITAL | Age: 48
LOS: 1 days | Discharge: LEFT BEFORE DISCHARGE | End: 2023-10-08
Payer: MEDICAID

## 2023-10-08 VITALS — HEIGHT: 66 IN

## 2023-10-08 PROCEDURE — L9992: CPT

## 2023-10-08 NOTE — ED ADULT TRIAGE NOTE - IDEAL BODY WEIGHT(KG)
"Encounter Date: 11/13/2022    SCRIBE #1 NOTE: I, Sabrina Rios, am scribing for, and in the presence of,  Lizzie Betancourt MD. I have scribed the entire note.     History     Chief Complaint   Patient presents with    requesting placement for detox     Pt requesting to be placed in a detox facility for use of heroin.  Pt states he does not have transportation. Last use was 2 days ago pt states he does coke at times. AAO x 3 nadn skin w.d pt denies SI or HI     Time seen by provider: 6:38 PM    This is a 41 y.o. male with a PMHx of hepatitis C who presents with complaint of heroin addiction, his last use being 2 days ago. He admits to occasional cocaine use. He states he is experiencing stomach pain, runny nose, and abdominal discomfort He states he has been homeless and "it's cold outside I need a place to spend the night". He wishes to be admitted to a rehab facility and requests proof of vaccination status. He denies a history of DM or HTN.    The history is provided by the patient.   Review of patient's allergies indicates:   Allergen Reactions    Tofranil [imipramine hcl]      Past Medical History:   Diagnosis Date    Bulging lumbar disc     Chronic back pain      Past Surgical History:   Procedure Laterality Date    arm surgery Left     lower bicep      No family history on file.  Social History     Tobacco Use    Smoking status: Every Day     Packs/day: 0.50     Types: Cigarettes   Substance Use Topics    Alcohol use: Yes     Comment: occasionally    Drug use: Yes     Types: Methamphetamines     Comment: first time using meth, was a pill form     Review of Systems   Constitutional:  Negative for chills and fever.   HENT:  Positive for rhinorrhea. Negative for congestion and sore throat.    Eyes:  Negative for visual disturbance.   Respiratory:  Negative for cough and shortness of breath.    Cardiovascular:  Negative for chest pain and palpitations.   Gastrointestinal:  Positive for abdominal pain. Negative " for diarrhea and vomiting.   Genitourinary:  Negative for decreased urine volume, dysuria and frequency.   Musculoskeletal:  Negative for joint swelling, neck pain and neck stiffness.   Skin:  Negative for rash and wound.   Neurological:  Negative for weakness, numbness and headaches.   Psychiatric/Behavioral:  Negative for behavioral problems and confusion.      Physical Exam     Initial Vitals [11/13/22 1823]   BP Pulse Resp Temp SpO2   (!) 104/56 72 18 98.1 °F (36.7 °C) 98 %      MAP       --         Physical Exam    Nursing note and vitals reviewed.  Constitutional: He appears well-developed and well-nourished.   +unkempt.    HENT:   Head: Normocephalic and atraumatic.   Nose: Nose normal.   Mouth/Throat: Oropharynx is clear and moist.   Eyes: Conjunctivae and EOM are normal. Pupils are equal, round, and reactive to light.   Neck: Neck supple.   Normal range of motion.  Cardiovascular:  Normal rate and regular rhythm.     Exam reveals no gallop and no friction rub.       No murmur heard.  Pulmonary/Chest: Breath sounds normal. No respiratory distress. He has no wheezes. He has no rales.   Abdominal: Abdomen is soft. Bowel sounds are normal. There is no abdominal tenderness. There is no rebound and no guarding.   Musculoskeletal:         General: No tenderness or edema.      Cervical back: Normal range of motion and neck supple.     Neurological: He is alert and oriented to person, place, and time. He has normal strength. No cranial nerve deficit or sensory deficit. Gait normal. GCS score is 15. GCS eye subscore is 4. GCS verbal subscore is 5. GCS motor subscore is 6.   Skin: Skin is warm and dry. No rash noted.   Psychiatric: He has a normal mood and affect. His speech is normal and behavior is normal.       ED Course   Procedures  Labs Reviewed - No data to display       Imaging Results    None          Medications   ondansetron disintegrating tablet 4 mg (4 mg Oral Given 11/13/22 4815)   ibuprofen tablet 600  mg (600 mg Oral Given 11/13/22 1904)     Medical Decision Making:   History:   Old Medical Records: I decided to obtain old medical records.  Clinical Tests:   Lab Tests: Ordered and Reviewed  Radiological Study: Ordered and Reviewed  Medical Tests: Ordered and Reviewed  ED Management:  Urgent evaluation a 41-year-old male homeless IV heroin user who presents requesting detox or shelter placement.  Patient is requesting food and a place to stay.  Vital signs are benign, afebrile.  On exam he has no abdominal tenderness, nonsurgical abdomen.  He is given Zofran for nausea, ibuprofen when he requested medication for pain.  He is given a list of local shelters and detox facilities.  He is discharged in good condition.        Scribe Attestation:   Scribe #1: I performed the above scribed service and the documentation accurately describes the services I performed. I attest to the accuracy of the note.                 Physician Attestation for Scribe: I, Lizzie Betancourt, reviewed documentation as scribed in my presence, which is both accurate and complete.   Clinical Impression:   Final diagnoses:  [Z59.00] Homelessness (Primary)  [F19.10] Substance abuse      ED Disposition Condition    Discharge Stable          ED Prescriptions    None       Follow-up Information       Follow up With Specialties Details Why Contact Info    [facility]  Schedule an appointment as soon as possible for a visit   [address]  [facility] [address]  [phone]      [facility] Emergency Medicine  As needed, If symptoms worsen [address] [facility] [id number] [phone]             Lizzie Betancourt MD  11/13/22 1921     64

## 2023-10-08 NOTE — ED ADULT TRIAGE NOTE - EXPLANATION OF PATIENT'S REASON FOR LEAVING
refusing to answer questions, cursing at staff, does not want to stay Renal following     NA   is 130  Bumex gtt  aggressive diuretic regimen: metolazone 5mg, followed 30 minutes later by bumex 4mg and then start bumex drip at 2/hr Renal following     NA   is 136  Bumex gtt  strict I & O's   daily weight

## 2023-10-10 ENCOUNTER — EMERGENCY (EMERGENCY)
Facility: HOSPITAL | Age: 48
LOS: 1 days | Discharge: HOME | End: 2023-10-10
Attending: EMERGENCY MEDICINE
Payer: MEDICAID

## 2023-10-10 VITALS
RESPIRATION RATE: 17 BRPM | TEMPERATURE: 98 F | SYSTOLIC BLOOD PRESSURE: 138 MMHG | HEIGHT: 66 IN | DIASTOLIC BLOOD PRESSURE: 88 MMHG | HEART RATE: 92 BPM | OXYGEN SATURATION: 99 %

## 2023-10-10 PROCEDURE — T1013: CPT

## 2023-10-10 PROCEDURE — 99283 EMERGENCY DEPT VISIT LOW MDM: CPT

## 2023-10-10 PROCEDURE — 99285 EMERGENCY DEPT VISIT HI MDM: CPT

## 2023-10-10 NOTE — ED PROVIDER NOTE - PATIENT PORTAL LINK FT
You can access the FollowMyHealth Patient Portal offered by [facility] by registering at the following website: [email or web address]. By joining [employer]’s FollowMyHealth portal, you will also be able to view your health information using other applications (apps) compatible with our system.

## 2023-10-10 NOTE — ED PROVIDER NOTE - PHYSICAL EXAMINATION
General: Awake, alert, sitting up in bed in NAD. Smells of EtOH, intoxicated  HEENT: Normocephalic, atraumatic. No scleral icterus or conjunctival injection. EOMI. Moist mucous membranes. Oropharynx clear.   Neck:. Soft and supple. No C-spine tenderness  Cardiac: RRR, Peripheral pulses 2+ and symmetric. No LE edema.  Resp: Lungs CTAB. No accessory muscle use  Abd: Soft, non-tender, non-distended. No guarding, rebound, or rigidity.  Back: Spine midline and non-tender.   MSK: No chest wall tenderness. pelvis stable. Extremities full ROM without pain  Skin: No rashes, abrasions, or lacerations.  Neuro: AO x 4. Moves all extremities symmetrically. Motor strength and sensation grossly intact.

## 2023-10-10 NOTE — ED ADULT NURSE NOTE - OBJECTIVE STATEMENT
received pt in yellow gown, pt a&ox3, offers no complaints, states he tripped fell and hit his head after drinking approx 5 beers today. denies loc, n/v, vision changes. ADAMSON X 4, ambulates steadily independently. tolerating PO

## 2023-10-10 NOTE — ED PROVIDER NOTE - PROGRESS NOTE DETAILS
Patient reassesed--no complaints.  Vital signs normal.  Resting comfortably.  A&Ox3.  Speech clear. Gait normal.  Clinically sober. Neil Bella MD

## 2023-10-10 NOTE — ED ADULT TRIAGE NOTE - CHIEF COMPLAINT QUOTE
pt presents to ED BIBA c/o alcohol intoxication and states he fell and hit his head, no injuries to head noted.  Patient undressed and placed in yellow gown, belongings secured.  pt awake, alert and oriented x3

## 2023-10-10 NOTE — ED ADULT NURSE NOTE - NSFALLRISKINTERV_ED_ALL_ED
Assistance OOB with selected safe patient handling equipment if applicable/Assistance with ambulation/Communicate fall risk and risk factors to all staff, patient, and family/Monitor gait and stability/Monitor for mental status changes and reorient to person, place, and time, as needed/Provide visual cue: yellow wristband, yellow gown, etc/Reinforce activity limits and safety measures with patient and family/Toileting schedule using arm’s reach rule for commode and bathroom/Use of alarms - bed, stretcher, chair and/or video monitoring/Call bell, personal items and telephone in reach/Instruct patient to call for assistance before getting out of bed/chair/stretcher/Non-slip footwear applied when patient is off stretcher/[place] to call system/Physically safe environment - no spills, clutter or unnecessary equipment/Purposeful Proactive Rounding/Room/bathroom lighting operational, light cord in reach

## 2023-10-10 NOTE — ED PROVIDER NOTE - CLINICAL SUMMARY MEDICAL DECISION MAKING FREE TEXT BOX
48M hx EtOH abuse presents for EtOH intoxication. Pt without complaints or external signs of trauma. Will monitor for sobriety and reassess

## 2023-10-10 NOTE — ED PROVIDER NOTE - OBJECTIVE STATEMENT
48M hx etoh abuse presents after being found intoxicated outside. Pt states he drank 5 beers. He is otherwise without complaints or external signs of trauma

## 2023-10-14 ENCOUNTER — EMERGENCY (EMERGENCY)
Facility: HOSPITAL | Age: 48
LOS: 1 days | Discharge: HOME | End: 2023-10-14
Attending: EMERGENCY MEDICINE
Payer: MEDICAID

## 2023-10-14 VITALS — WEIGHT: 149.91 LBS | HEIGHT: 66 IN

## 2023-10-14 PROCEDURE — 99283 EMERGENCY DEPT VISIT LOW MDM: CPT

## 2023-10-14 NOTE — ED ADULT NURSE NOTE - NSFALLRISKINTERV_ED_ALL_ED
Assistance OOB with selected safe patient handling equipment if applicable/Assistance with ambulation/Communicate fall risk and risk factors to all staff, patient, and family/Monitor gait and stability/Monitor for mental status changes and reorient to person, place, and time, as needed/Provide visual cue: yellow wristband, yellow gown, etc/Reinforce activity limits and safety measures with patient and family/Toileting schedule using arm’s reach rule for commode and bathroom/Use of alarms - bed, stretcher, chair and/or video monitoring/Call bell, personal items and telephone in reach/Instruct patient to call for assistance before getting out of bed/chair/stretcher/Non-slip footwear applied when patient is off stretcher/[place] to call system/Physically safe environment - no spills, clutter or unnecessary equipment/Purposeful Proactive Rounding/Room/bathroom lighting operational, light cord in reach

## 2023-10-14 NOTE — ED PROVIDER NOTE - CLINICAL SUMMARY MEDICAL DECISION MAKING FREE TEXT BOX
Patient is a 49 yo male with PMHx EtOH abuse BIBEMS after being found outside of [employer] intoxicated. VSS    Will check fingerstick to r.o hypoglycemia, continue to monitor, reassess, MTF.

## 2023-10-14 NOTE — ED PROVIDER NOTE - PHYSICAL EXAMINATION
Gen: no acute distress  Head: normocephalic, atraumatic  Lung: CTAB, no respiratory distress, no wheezing, rales, rhonchi  CV: normal s1/s2, rrr,   Abd: soft, non-tender, non-distended  MSK: No edema, no visible deformities, full range of motion in all 4 extremities  Neuro: No focal neurologic deficits  Skin: No lacerations no active bleeding no ecchymoses no signs of trauma

## 2023-10-14 NOTE — ED PROVIDER NOTE - NSFOLLOWUPINSTRUCTIONS_ED_ALL_ED_FT
Alcohol Intoxication  Alcohol intoxication occurs when a person no longer thinks clearly or functions well after drinking alcohol. This is also referred to as becoming impaired. Intoxication can occur with just one drink. The legal definition of alcohol intoxication depends on the amount of alcohol in the blood (blood alcohol concentration, CHANDU). CHANDU of 80–100 mg/dL or higher is commonly considered legally intoxicated. The level of impairment depends on:  The amount of alcohol the person had.  The person's age, gender, and weight.  How often the person drinks.  Whether the person has other medical conditions, such as diabetes, seizures, or a heart condition.  Alcohol intoxication can range from mild to severe. The condition can be dangerous, especially if the person:  Also took certain drugs or prescription medicines.  Drinks a large amount of alcohol in a short period of time (binge drinks).  For women, binge drinking is having four or more drinks at one time.  For men, binge drinking is having five or more drinks at one time.  If you or anyone around you appears intoxicated, speak up and act.    What are the causes?  This condition is caused by drinking alcohol.    What increases the risk?  The following factors may make you more likely to develop this condition:  Peer pressure in young adults.  Difficulty managing stress.  History of drug or alcohol abuse.  Family history of drug or alcohol abuse.  Combining alcohol with drugs.  Low body weight.  Binge drinking.  What are the signs or symptoms?  Symptoms of alcohol intoxication can vary from person to person. Symptoms can be mild, moderate, or severe.    Symptoms of mild alcohol intoxication may include:  Feeling relaxed or sleepy.  Having mild difficulty with coordination, speech, memory, or attention.  Symptoms of moderate alcohol intoxication may include:  Strong anger or extreme sadness.  Moderate difficulty with coordination, speech, memory, or attention.  Symptoms of severe alcohol intoxication may include:  Severe difficulty with coordination, speech, memory, or attention.  Passing out.  Vomiting.  Confusion.  Slow breathing.  Coma.  Intoxication can change quickly from mild to severe. It can cause coma or death, especially in people who do not drink alcohol often.    How is this diagnosed?  Your health care provider will ask you how much alcohol you drank and what kind you had. Intoxication may also be diagnosed based on:  Your symptoms and medical history.  A physical exam.  A blood test that measures CHANDU.  A smell of alcohol on your breath.  How is this treated?  Treatment for alcohol intoxication may include:  Being monitored in an emergency department, hospital, or treatment center until your CHANDU comes down and it is safe for you to go home.  IV fluids to prevent or treat loss of fluid in the body (dehydration).  Medicine to treat nausea or vomiting or to get rid of alcohol in the body.  Counseling about the dangers of using alcohol.  Treatment for substance use disorder.  Oxygen therapy or a breathing machine (ventilator).  Drinking alcohol for a long time can have long-term effects on your brain, heart, and digestive system. These effects can be serious and may also require treatment.    Follow these instructions at home:  A sign showing that a person should not drive.  Eating and drinking    A 12-ounce bottle of beer, a 5-ounce glass of wine, and a 1.5-ounce shot of hard liquor.  Do not drink alcohol if:  Your health care provider tells you not to drink.  You are pregnant, may be pregnant, or are planning to become pregnant.  You are under the legal drinking age, or under 21 years old in the [place]  You are taking medicines that should not be taken with alcohol.  You have a medical condition, and alcohol makes it worse.  You need to drive or perform activities that require you to be alert.  You have substance use disorder.  Ask your health care provider if alcohol is safe for you. If your health care provider allows you to drink alcohol, limit how much you have to:  0–1 drink a day for women who are not pregnant.  0–2 drinks a day for men.  Know how much alcohol is in your drink. In the [place] one drink equals one 12 oz bottle of beer (355 mL), one 5 oz glass of wine (148 mL), or one 1½ oz glass of hard liquor (44 mL).  Avoid drinking alcohol on an empty stomach.  Alcohol increases urination. It is important to stay hydrated and avoid caffeine.  Avoid drinking more than one drink per hour.  When having multiple drinks, drink water or a non-alcoholic beverage between alcoholic drinks.  General instructions    Take over-the-counter and prescription medicines only as told by your health care provider.  Do not drive after drinking any amount of alcohol. Plan for a designated  or another way to go home.  Have someone responsible stay with you while you are intoxicated. You should notbe left alone.  Contact a health care provider if:  You do not feel better after a few days.  You have problems at work, at school, or at home due to drinking.  Get help right away if:  You have any of the following:  Trouble staying awake.  Moderate to severe trouble with coordination, speech, memory, or attention.  You are told you may have had a seizure.  Vomiting bright red blood or material that looks like coffee grounds.  Bloody stool (feces). The blood may make your stool bright red, black, or tarry.  These symptoms may be an emergency. Get help right away. Call 911.  Do not wait to see if the symptoms will go away.  Do not drive yourself to the hospital.  Also, get help right away if:  You have thoughts about hurting yourself or others.  Take one of these steps if you feel like you may hurt yourself or others, or have thoughts about taking your own life:  Call 911.  Call the National Suicide Prevention Lifeline at 1-437.124.1578 or 895. This is open 24 hours a day.  Text the Crisis Text Line at 920858.  Summary  Alcohol intoxication occurs when a person no longer thinks clearly or functions well after drinking alcohol.  Ask your health care provider if alcohol is safe for you. If your health care provider allows you to drink alcohol, limit how much you have.  Contact your health care provider if drinking has caused you problems at work, school, or home.  Get help right away if you have thoughts about hurting yourself or others.  This information is not intended to replace advice given to you by your health care provider. Make sure you discuss any questions you have with your health care provider.
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
## 2023-10-14 NOTE — ED PROVIDER NOTE - PATIENT PORTAL LINK FT
You can access the FollowMyHealth Patient Portal offered by [facility] by registering at the following website: [email or web address]. By joining [employer]’s FollowMyHealth portal, you will also be able to view your health information using other applications (apps) compatible with our system.
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
## 2023-10-14 NOTE — ED ADULT NURSE NOTE - OBJECTIVE STATEMENT
Assumed care of pt at 2030. Pt received in yellow gown with no belongings at bedside. Pt appears lethargic, responsive to vocal and painful stimuli. GCS 15, ETOH smelled on breath, pt appears unkempt and not domiciled. Pt presents to ED from [employer]. . No evidence of bruising or trauma noted to head upon assessment. Pt denies CP/SOB. PO nutrition provided.

## 2023-10-15 VITALS
HEART RATE: 80 BPM | RESPIRATION RATE: 20 BRPM | OXYGEN SATURATION: 98 % | SYSTOLIC BLOOD PRESSURE: 121 MMHG | TEMPERATURE: 98 F | DIASTOLIC BLOOD PRESSURE: 72 MMHG

## 2023-10-15 PROCEDURE — T1013: CPT

## 2023-10-15 PROCEDURE — 82962 GLUCOSE BLOOD TEST: CPT

## 2023-10-15 PROCEDURE — 99285 EMERGENCY DEPT VISIT HI MDM: CPT

## 2023-10-27 ENCOUNTER — EMERGENCY (EMERGENCY)
Facility: HOSPITAL | Age: 48
LOS: 1 days | Discharge: HOME | End: 2023-10-27
Attending: EMERGENCY MEDICINE
Payer: MEDICAID

## 2023-10-27 VITALS
DIASTOLIC BLOOD PRESSURE: 69 MMHG | OXYGEN SATURATION: 95 % | HEART RATE: 82 BPM | TEMPERATURE: 98 F | SYSTOLIC BLOOD PRESSURE: 107 MMHG | RESPIRATION RATE: 18 BRPM

## 2023-10-27 VITALS — WEIGHT: 149.91 LBS | HEIGHT: 66 IN

## 2023-10-27 PROCEDURE — 99285 EMERGENCY DEPT VISIT HI MDM: CPT

## 2023-10-27 PROCEDURE — 99284 EMERGENCY DEPT VISIT MOD MDM: CPT

## 2023-10-27 RX ORDER — IBUPROFEN 200 MG
600 TABLET ORAL ONCE
Refills: 0 | Status: DISCONTINUED | OUTPATIENT
Start: 2023-10-27 | End: 2023-11-03

## 2023-10-27 NOTE — ED PROVIDER NOTE - CLINICAL SUMMARY MEDICAL DECISION MAKING FREE TEXT BOX
pt w/ pmhx of etoh abuse presenting intoxicated from [employer]. Patient feeling better on provider assessment-- currently w/ no complaints.  Vital signs normal.  Resting comfortably.  A&Ox3.  Speech clear. Gait normal.  Clinically sober. Requesting medicaid cab back to his home in [place].

## 2023-10-27 NOTE — ED PROVIDER NOTE - ATTENDING CONTRIBUTION TO CARE
I, Gatito Adorno, performed a face to face bedside interview with this patient regarding history of present illness, review of symptoms and relevant past medical, social and family history.  I completed an independent physical examination. I have communicated the patient’s plan of care and disposition with the resident.  Pt presents with alcohol intoxication and reports chronic elbow pain x 1 year, relieved with ibuprofen, no trauma  Gen: NAD, well appearing  CV: RRR  Pul: CTA b/l  Abd: Soft, non-distended, non-tender  Neuro: no focal deficits  msk: FROM, non-tender, no deformity  Pt is awake, alert, lucid and coherent. Ambulating with steady gait, no complaints, no sign of trauma. No clinical sign of intox or withdrawal. Stable for dc.
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
## 2023-10-27 NOTE — ED ADULT NURSE NOTE - NSFALLUNIVINTERV_ED_ALL_ED
Bed/Stretcher in lowest position, wheels locked, appropriate side rails in place/Call bell, personal items and telephone in reach/Instruct patient to call for assistance before getting out of bed/chair/stretcher/Non-slip footwear applied when patient is off stretcher/Circle to call system/Physically safe environment - no spills, clutter or unnecessary equipment/Purposeful proactive rounding/Room/bathroom lighting operational, light cord in reach

## 2023-10-27 NOTE — ED PROVIDER NOTE - NSFOLLOWUPINSTRUCTIONS_ED_ALL_ED_FT
-Laura un seguimiento con espino médico de atención primaria en los próximos 2 nora. Por favor llame mañana para addison syd. Si no puede hacer un seguimiento con espino médico de atención primaria, regrese al servicio de urgencias para cualquier problema urgente.  - Se le entregó addison copia de las pruebas realizadas hoy. Traiga los resultados con usted y revíselos con espino médico de atención primaria.  - Si tiene algún empeoramiento de los síntomas o cualquier otra inquietud, regrese al servicio de urgencias de inmediato.  - Continúe tomando shahid medicamentos en el hogar según las indicaciones.    Trastorno por consumo de alcohol    LO QUE NECESITAS SABER:    El trastorno por consumo de alcohol (AUD) es un problema con la bebida. AUD incluye el abuso y la dependencia del alcohol.    INSTRUCCIONES DE DESCARGA:    Busque atención inmediatamente si:    Tu corazón late más rápido de lo habitual.      Tienes alucinaciones.      No puedes recordar lo que sucede mientras bebes.      Tienes convulsiones.    Comuníquese con espino proveedor de atención médica si:    Está ansioso y tiene náuseas.      Te tiemblan las selvin y sudas mucho.      Tiene preguntas o inquietudes sobre espino condición o atención.    Consulte con espino proveedor de atención médica según las indicaciones: No intente dejar de beber por espino cuenta. Es posible que espino proveedor de atención médica necesite ayudarlo a dejar el alcohol de manera skelton. Es posible que necesite internarlo en el hospital. También puede necesitar alguno de los siguientes tratamientos:    Medicamentos para disminuir el deseo de consumir alcohol      Grupos de apoyo zoya Alcohólicos Anónimos      Terapia de un psiquiatra o psicólogo.      Admisión a un centro de internación para recibir tratamiento por AUD grave    ¿Está interesado en analizar opciones para reducir espino consumo de alcohol o drogas?    [facility]: [phone]  Servicios de abuso de sustancias de [facility]: [phone], opción n.° 2  Mantenimiento con metadona y desintoxicación ambulatoria de opiáceos: [phone]  [facility] del proyecto: [phone]  [facility] de tratamiento [facility]: [phone]  [facility]: [phone]    [facility]: [phone], opción n.° 2  [facility] de tratamiento [facility]: [phone]    [facility]: [phone]    [facility] ESTADO  Admisión [facility]: [phone]  [facility] Dependencia de sustancias químicas/abstinencia auxiliar: [phone]  Mantenimiento de [place] [facility]: [phone]    [facility]: [phone]  Servicios de tratamiento de adicciones del [facility] [place]: [phone]    [facility] del estado de [facility]: [phone]  Oficina de Servicios de Alcoholismo y Abuso de Sustancias del Estado [facility] (OASAS): https://www.[place]asas.[place].gov/providerdirectory/  [facility] para la Investigación de Servicios de Adicciones e Intervenciones de Psicoterapia (CASPIR) www.caspirnyc.org    ¿Está interesado en analizar opciones para reducir espino consumo de tabaco?  Centro de [facility] para el control del tabaco: [phone]  QUITLINE del estado de [place]: [phone])    ¿Está interesado en aprender más sobre el uso de sustancias?  http://rethinkingdrinking.niaaa.nih.gov  https://www.drugabuse.gov/patients-families    Obtenga más información sobre los programas de prevención de sobredosis de opioides en el estado de [place]:  http://www.[facility].[place].gov/diseases/aids/general/opioid_overdose_prevention/

## 2023-10-27 NOTE — ED PROVIDER NOTE - PATIENT PORTAL LINK FT
You can access the FollowMyHealth Patient Portal offered by [facility] by registering at the following website: [email or web address]. By joining [employer]’s FollowMyHealth portal, you will also be able to view your health information using other applications (apps) compatible with our system. You can access the FollowMyHealth Patient Portal offered by [facility] by registering at the following website: [email or web address]. By joining [employer]’s FollowMyHealth portal, you will also be able to view your health information using other applications (apps) compatible with our system.

## 2023-10-30 NOTE — ED ADULT TRIAGE NOTE - PRO INTERPRETER NEED 2
The contrast used during myelography is water-based and will be absorbed by your body and removed in your urine. It may cause some side effects including nausea, vomiting, headaches, and rarely a seizure. If a headache develops, it is usually mild by can be more severe, and can last a few days to a week.     The day of the myelogram:   1. A responsible adult must stay with you overnight. It is very important that someone is with you or is available if you become ill.   2. Do not drive or operate mechanical equipment.   3. Stay in bed with your head elevated 20-30 degrees (two pillows) for the rest of the day. You may get up to go to the bathroom, but immediately return to bed.   4. Refrain from excessive exertion and movements. Excessive movement increases the possibility of severity of the headache.   5. Return to your normal diet.   6. Drink plenty of fluids. This helps to flush the contrast through your kidneys. You are encouraged to drink several glasses of fluid each hour.   7. Do not drink alcoholic beverages.     The day after the myelogram:   1. You may return to normal daily activities, but avoid strenuous work or exercise.   2. If you develop a severe headache, lie absolutely flat for 4-6 hours. If it continues or worsens, please call your referring physician.    The radiologist will study and interpret the films, and will report these findings to your doctor. Your own personal doctor will explain the results with you.    If you have questions or concerns about the myelogram, you may call the [facility] Radiology Department listed below:    [facility]    [phone]    English

## 2023-11-14 NOTE — ED ADULT NURSE NOTE - NS ED NURSE LEVEL OF CONSCIOUSNESS ORIENTATION
Detail Level: Zone Detail Level: Detailed Patient Specific Counseling (Will Not Stick From Patient To Patient): 150 for vbeam for less than 15. Detail Level: Simple Oriented - self; Oriented - place; Oriented - time

## 2023-11-15 ENCOUNTER — EMERGENCY (EMERGENCY)
Facility: HOSPITAL | Age: 48
LOS: 1 days | Discharge: HOME | End: 2023-11-15
Attending: STUDENT IN AN ORGANIZED HEALTH CARE EDUCATION/TRAINING PROGRAM
Payer: MEDICAID

## 2023-11-15 VITALS
OXYGEN SATURATION: 98 % | HEIGHT: 66 IN | RESPIRATION RATE: 18 BRPM | HEART RATE: 74 BPM | DIASTOLIC BLOOD PRESSURE: 80 MMHG | TEMPERATURE: 98 F | WEIGHT: 149.91 LBS | SYSTOLIC BLOOD PRESSURE: 122 MMHG

## 2023-11-15 PROCEDURE — 99284 EMERGENCY DEPT VISIT MOD MDM: CPT

## 2023-11-15 PROCEDURE — 70486 CT MAXILLOFACIAL W/O DYE: CPT | Mod: 26,MA

## 2023-11-15 PROCEDURE — 70450 CT HEAD/BRAIN W/O DYE: CPT | Mod: 26,MA

## 2023-11-15 PROCEDURE — 72125 CT NECK SPINE W/O DYE: CPT | Mod: 26,MA

## 2023-11-15 NOTE — ED PROVIDER NOTE - NSFOLLOWUPINSTRUCTIONS_ED_ALL_ED_FT
Follow up with your primary doctor as needed for repeat evaluation.    Return here or go to the nearest hospital if you develop any new acute symptoms of concern such as severe pain, intractable nausea/vomiting, or other new worries.
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
## 2023-11-15 NOTE — ED PROVIDER NOTE - PATIENT PORTAL LINK FT
You can access the FollowMyHealth Patient Portal offered by [facility] by registering at the following website: [email or web address]. By joining [employer]’s FollowMyHealth portal, you will also be able to view your health information using other applications (apps) compatible with our system.
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
## 2023-11-15 NOTE — ED PROVIDER NOTE - PHYSICAL EXAMINATION
Exam and dc.d home per ED MD   Gen: NAD, non-toxic, conversational  Eyes: PERRLA, EOMI   HENT: Normocephalic, swelling along nasal bridge, no obvious lacerations or abrasions, no bleeding, no epistaxis or septal hematoma. external ears normal, no rhinorrhea, moist mucous membranes.   CV: RRR, no M/R/G  Resp: CTAB, non-labored, speaking without difficulty on room air  Abd: soft, non tender, non rigid, no guarding or rebound tenderness  Back: No CVAT bilaterally, no midline ttp  Skin: dry, wwp   Neuro: AOx3, speech is fluent and appropriate  Psych: Mood ok, affect euthymic

## 2023-11-15 NOTE — ED PROVIDER NOTE - PROGRESS NOTE DETAILS
JASWINDER Abarca: clinically sober alert and oriented states that his girlfriend is here to pick him up requesting d/c; written for d/c.
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
## 2023-11-16 VITALS
HEART RATE: 77 BPM | SYSTOLIC BLOOD PRESSURE: 110 MMHG | DIASTOLIC BLOOD PRESSURE: 72 MMHG | TEMPERATURE: 98 F | RESPIRATION RATE: 18 BRPM | OXYGEN SATURATION: 98 %

## 2023-11-16 PROCEDURE — 99285 EMERGENCY DEPT VISIT HI MDM: CPT

## 2023-11-16 PROCEDURE — 70486 CT MAXILLOFACIAL W/O DYE: CPT | Mod: MA

## 2023-11-16 PROCEDURE — 72125 CT NECK SPINE W/O DYE: CPT | Mod: MA

## 2023-11-16 PROCEDURE — 70450 CT HEAD/BRAIN W/O DYE: CPT | Mod: MA

## 2023-12-10 ENCOUNTER — EMERGENCY (EMERGENCY)
Facility: HOSPITAL | Age: 48
LOS: 1 days | Discharge: HOME | End: 2023-12-10
Attending: STUDENT IN AN ORGANIZED HEALTH CARE EDUCATION/TRAINING PROGRAM
Payer: MEDICAID

## 2023-12-10 PROCEDURE — 93010 ELECTROCARDIOGRAM REPORT: CPT

## 2023-12-10 PROCEDURE — 99284 EMERGENCY DEPT VISIT MOD MDM: CPT

## 2023-12-11 VITALS
SYSTOLIC BLOOD PRESSURE: 110 MMHG | HEART RATE: 65 BPM | TEMPERATURE: 98 F | DIASTOLIC BLOOD PRESSURE: 76 MMHG | OXYGEN SATURATION: 99 % | RESPIRATION RATE: 17 BRPM

## 2023-12-11 VITALS
WEIGHT: 139.99 LBS | HEART RATE: 71 BPM | TEMPERATURE: 98 F | SYSTOLIC BLOOD PRESSURE: 107 MMHG | DIASTOLIC BLOOD PRESSURE: 65 MMHG | HEIGHT: 66 IN | RESPIRATION RATE: 18 BRPM | OXYGEN SATURATION: 99 %

## 2023-12-11 PROCEDURE — 99285 EMERGENCY DEPT VISIT HI MDM: CPT | Mod: 25

## 2023-12-11 PROCEDURE — 82962 GLUCOSE BLOOD TEST: CPT

## 2023-12-11 PROCEDURE — 93005 ELECTROCARDIOGRAM TRACING: CPT

## 2023-12-11 PROCEDURE — 73030 X-RAY EXAM OF SHOULDER: CPT

## 2023-12-11 PROCEDURE — 73030 X-RAY EXAM OF SHOULDER: CPT | Mod: 26,LT

## 2023-12-11 RX ORDER — IBUPROFEN 200 MG
600 TABLET ORAL ONCE
Refills: 0 | Status: COMPLETED | OUTPATIENT
Start: 2023-12-11 | End: 2023-12-11

## 2023-12-11 RX ORDER — LIDOCAINE 4 G/100G
1 CREAM TOPICAL ONCE
Refills: 0 | Status: COMPLETED | OUTPATIENT
Start: 2023-12-11 | End: 2023-12-11

## 2023-12-11 RX ADMIN — LIDOCAINE 1 PATCH: 4 CREAM TOPICAL at 02:06

## 2023-12-11 RX ADMIN — Medication 600 MILLIGRAM(S): at 02:06

## 2023-12-11 NOTE — ED ADULT NURSE REASSESSMENT NOTE - NS ED NURSE REASSESS COMMENT FT1
all belongings returned to pt. pt ambulated off unit without difficulty. vss in no acute distress at time of discharge.

## 2023-12-11 NOTE — ED PROVIDER NOTE - NSFOLLOWUPINSTRUCTIONS_ED_ALL_ED_FT
Dolor en el hombro  Shoulder Pain  Muchas cosas pueden provocar dolor en el hombro, por ejemplo:  Addison lesión en el hombro.  El uso excesivo del hombro.  Artritis.  La causa del dolor puede ser lo siguiente:  Inflamación.  Addison lesión en la articulación del hombro.  Addison lesión en un tendón, ligamento o hueso.  Siga estas indicaciones en espino casa:  Esté atento a los cambios en los síntomas. Informe a espino médico acerca de cualquier cambio. Siga estas indicaciones para aliviar el dolor.    Si tiene un cabestrillo extraíble:    Úselo zoya se lo haya indicado el médico. Quíteselo solamente zoya se lo haya indicado el médico.  Controle la piel alrededor del cabestrillo todos los días. Informe al médico acerca de cualquier inquietud.  Afloje el cabestrillo si los dedos de la mano se le adormecen, se le enfrían o siente hormigueo en ellos.  Mantenga el cabestrillo limpio.  Si el cabestrillo no es impermeable:  No deje que se moje.  Quíteselo para ducharse o para bañarse.  Mueva el brazo lo menos posible, kim mantenga la mano en movimiento para evitar la hinchazón.  Control del dolor, la rigidez y la hinchazón    Bag of ice on a towel on the skin.  Si se lo indican, aplique hielo sobre la lalitha dolorida.  Si tiene un cabestrillo o un inmovilizador desmontable, quíteselo zoya se lo haya indicado el médico.  Ponga el hielo en addison bolsa plástica.  Coloque addison toalla entre la piel y la bolsa.  Aplique el hielo zehra 20 minutos, 2 o 3 veces por día.  Si la piel se le pone de color short brillante, retire el hielo de inmediato para evitar daños en la piel. El riesgo de daño es mayor si no puede sentir dolor, calor o frío.  Mueva los dedos de la mano con frecuencia para reducir la rigidez y la hinchazón.  Apriete addison pelota blanda o addison almohadilla de goma tanto zoya sea posible. [place] ayuda e prevenir la hinchazón en el hombro. También ayuda a fortalecer el brazo.  Indicaciones generales    Use los medicamentos de venta tatyana y los recetados solamente zoya se lo haya indicado el médico.  El ejercicio puede ayudar a controlar el dolor. Laura ejercicios si se lo indicó el médico.  Es posible que lo deriven a un fisioterapeuta para que lo ayude en el proceso de recuperación.  Cumpla con todas las visitas de seguimiento con el objeto de evitar complicaciones en el hombro y dolor crónico o discapacidad.  Comuníquese con un médico si:  El dolor no se dayna con los medicamentos.  Aparece un dolor nuevo en el brazo, la mano o los dedos.  Afloja el cabestrillo y el brazo, la mano o los dedos permanecen adormecidos, hinchados, doloridos o con hormigueo.  Solicite ayuda de inmediato si:  El brazo, la mano o los dedos se tornan de color ballesteros o el.  Esta información no tiene zoya fin reemplazar el consejo del médico. Asegúrese de hacerle al médico cualquier pregunta que tenga. Dolor en el hombro  Shoulder Pain  Muchas cosas pueden provocar dolor en el hombro, por ejemplo:  Addison lesión en el hombro.  El uso excesivo del hombro.  Artritis.  La causa del dolor puede ser lo siguiente:  Inflamación.  Addison lesión en la articulación del hombro.  Addison lesión en un tendón, ligamento o hueso.  Siga estas indicaciones en espino casa:  Esté atento a los cambios en los síntomas. Informe a espino médico acerca de cualquier cambio. Siga estas indicaciones para aliviar el dolor.    Si tiene un cabestrillo extraíble:    Úselo zoya se lo haya indicado el médico. Quíteselo solamente zoya se lo haya indicado el médico.  Controle la piel alrededor del cabestrillo todos los días. Informe al médico acerca de cualquier inquietud.  Afloje el cabestrillo si los dedos de la mano se le adormecen, se le enfrían o siente hormigueo en ellos.  Mantenga el cabestrillo limpio.  Si el cabestrillo no es impermeable:  No deje que se moje.  Quíteselo para ducharse o para bañarse.  Mueva el brazo lo menos posible, kim mantenga la mano en movimiento para evitar la hinchazón.  Control del dolor, la rigidez y la hinchazón    Bag of ice on a towel on the skin.  Si se lo indican, aplique hielo sobre la lalitha dolorida.  Si tiene un cabestrillo o un inmovilizador desmontable, quíteselo zoya se lo haya indicado el médico.  Ponga el hielo en addison bolsa plástica.  Coloque addison toalla entre la piel y la bolsa.  Aplique el hielo zehra 20 minutos, 2 o 3 veces por día.  Si la piel se le pone de color short brillante, retire el hielo de inmediato para evitar daños en la piel. El riesgo de daño es mayor si no puede sentir dolor, calor o frío.  Mueva los dedos de la mano con frecuencia para reducir la rigidez y la hinchazón.  Apriete addison pelota blanda o addison almohadilla de goma tanto zoya sea posible. [place] ayuda e prevenir la hinchazón en el hombro. También ayuda a fortalecer el brazo.  Indicaciones generales    Use los medicamentos de venta tatyana y los recetados solamente zoya se lo haya indicado el médico.  El ejercicio puede ayudar a controlar el dolor. Laura ejercicios si se lo indicó el médico.  Es posible que lo deriven a un fisioterapeuta para que lo ayude en el proceso de recuperación.  Cumpla con todas las visitas de seguimiento con el objeto de evitar complicaciones en el hombro y dolor crónico o discapacidad.  Comuníquese con un médico si:  El dolor no se dayna con los medicamentos.  Aparece un dolor nuevo en el brazo, la mano o los dedos.  Afloja el cabestrillo y el brazo, la mano o los dedos permanecen adormecidos, hinchados, doloridos o con hormigueo.  Solicite ayuda de inmediato si:  El brazo, la mano o los dedos se tornan de color ballesteros o el.  Esta información no tiene zoya fin reemplazar el consejo del médico. Asegúrese de hacerle al médico cualquier pregunta que tenga.

## 2023-12-11 NOTE — ED ADULT NURSE NOTE - NSFALLUNIVINTERV_ED_ALL_ED
Bed/Stretcher in lowest position, wheels locked, appropriate side rails in place/Call bell, personal items and telephone in reach/Instruct patient to call for assistance before getting out of bed/chair/stretcher/Non-slip footwear applied when patient is off stretcher/[place] to call system/Physically safe environment - no spills, clutter or unnecessary equipment/Purposeful proactive rounding/Room/bathroom lighting operational, light cord in reach Bed/Stretcher in lowest position, wheels locked, appropriate side rails in place/Call bell, personal items and telephone in reach/Instruct patient to call for assistance before getting out of bed/chair/stretcher/Non-slip footwear applied when patient is off stretcher/[place] to call system/Physically safe environment - no spills, clutter or unnecessary equipment/Purposeful proactive rounding/Room/bathroom lighting operational, light cord in reach

## 2023-12-11 NOTE — ED PROVIDER NOTE - CARE PROVIDERS DIRECT ADDRESSES
[email or web address]

## 2023-12-11 NOTE — ED ADULT NURSE NOTE - OBJECTIVE STATEMENT
pt presenting to the ed complaining of L shoulder pain. pt reporting the shoulder has been causing him discomfort for about three weeks. Denies trauma to the shoulder. Denies chest pain, shortness of breath, weakness, pt reports drinking alcohol tonight. pmh etoh abuse. alert and oriented x4. respirations equal/unlabored. pt in yellow gown, belonging secured for safety. Pending x ray of shoulder.

## 2023-12-11 NOTE — ED PROVIDER NOTE - OBJECTIVE STATEMENT
48y M w/ hx ETOH abuse presents for shoulder pain. Pt complaining of few weeks of atraumatic left shoulder discomfort with associated "clicking" sensation. Says he has been taking tylenol/motrin without relief. Admits to drinking alcohol tonight.

## 2023-12-11 NOTE — ED PROVIDER NOTE - CARE PROVIDER_API CALL
David Fishman  Orthopaedic Surgery  [address] [place] [id number]  Phone: [phone]  Fax: [phone]  Follow Up Time:    David Fishman  Orthopaedic Surgery  [address] [place] [id number]  Phone: [phone]  Fax: [phone]  Follow Up Time:

## 2023-12-11 NOTE — ED PROVIDER NOTE - PROGRESS NOTE DETAILS
Perry: Pt clinically sober, ambulatory in the ED, AAOx4. Will give referral to ortho. Stable for discharge.

## 2023-12-11 NOTE — ED PROVIDER NOTE - CLINICAL SUMMARY MEDICAL DECISION MAKING FREE TEXT BOX
48y M w/ hx ETOH abuse presents with few weeks of atraumatic left shoulder pain. X-rays negative for fracture. Medically stable for discharge with outpatient ortho f/u.

## 2023-12-11 NOTE — ED PROVIDER NOTE - PHYSICAL EXAMINATION
Constitutional: Awake, alert, in no acute distress, seems mildly intoxicated  Eyes: no scleral icterus  HENT: normocephalic, atraumatic, moist oral mucosa  Neck: supple  CV: RRR, no murmur  Pulm: non-labored respirations  Abdomen: soft, non-tender, non-distended  Extremities: +mild tenderness left shoulder with slightly decreased ROM 2/2 pain, no deformity  Skin: no rash, no jaundice  Neuro: AAOx3, moving all extremities equally

## 2023-12-11 NOTE — ED PROVIDER NOTE - PATIENT PORTAL LINK FT
You can access the FollowMyHealth Patient Portal offered by [facility] by registering at the following website: [email or web address]. By joining [employer]’s FollowMyHealth portal, you will also be able to view your health information using other applications (apps) compatible with our system. You can access the FollowMyHealth Patient Portal offered by [facility] by registering at the following website: [email or web address]. By joining [employer]’s FollowMyHealth portal, you will also be able to view your health information using other applications (apps) compatible with our system.

## 2024-01-09 NOTE — ED PROVIDER NOTE - NS ED MD DISPO DISCHARGE
Writer called and spoke with wife Maria Alejandra. Appointment was scheduled for follow up from ER-fall. Maria Alejandra did not have any further concerns.    Home

## 2024-02-23 ENCOUNTER — EMERGENCY (EMERGENCY)
Facility: HOSPITAL | Age: 49
LOS: 1 days | Discharge: HOME | End: 2024-02-23
Attending: EMERGENCY MEDICINE
Payer: MEDICAID

## 2024-02-23 VITALS
OXYGEN SATURATION: 97 % | HEART RATE: 72 BPM | SYSTOLIC BLOOD PRESSURE: 108 MMHG | DIASTOLIC BLOOD PRESSURE: 73 MMHG | RESPIRATION RATE: 18 BRPM | TEMPERATURE: 98 F

## 2024-02-23 PROCEDURE — T1013: CPT

## 2024-02-23 PROCEDURE — 99285 EMERGENCY DEPT VISIT HI MDM: CPT

## 2024-02-23 PROCEDURE — 99283 EMERGENCY DEPT VISIT LOW MDM: CPT
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
## 2024-02-23 NOTE — ED PROVIDER NOTE - PROGRESS NOTE DETAILS
Clinically sober, PO challenged, walks with stable gait, wants to go home, will dc with medicaid cab.

## 2024-02-23 NOTE — ED PROVIDER NOTE - ATTENDING CONTRIBUTION TO CARE
I personally saw the patient with the resident, and completed the key components of the history and physical exam. I then discussed the management plan with the resident.    48-year-old male past medical history alcohol abuse presents for alcohol intoxication, no outward signs of trauma, no complaints.      I agree with exam as documented.      Will observe for sobriety, Accu-Chek.

## 2024-02-23 NOTE — ED PROVIDER NOTE - NSFOLLOWUPINSTRUCTIONS_ED_ALL_ED_FT
- Stop drinking alcohol.  - Please follow-up with your primary care doctor.  Please call for an appointment in the next 1-3 days but if you cannot follow-up with your primary care doctor please return to the ED for any urgent issues.  - You were given a copy of the tests performed today.  Please bring the results with you and review them with your primary care doctor.  - If you have any worsening of symptoms or any other concerns please return to the ED immediately.  - Please continue taking your home medications as directed.

## 2024-02-23 NOTE — ED PROVIDER NOTE - PATIENT PORTAL LINK FT
You can access the FollowMyHealth Patient Portal offered by [facility] by registering at the following website: [email or web address]. By joining [employer]’s FollowMyHealth portal, you will also be able to view your health information using other applications (apps) compatible with our system.

## 2024-02-23 NOTE — ED PROVIDER NOTE - CLINICAL SUMMARY MEDICAL DECISION MAKING FREE TEXT BOX
Patient is a 49yo M with PMHx of EtOH abuse who presents to the ED BIBEMS complaining of intoxication. Will observe for sobriety, get finger stick.

## 2024-02-23 NOTE — ED PROVIDER NOTE - NSCAREINITIATED _GEN_ER
Jamaal Alvarado(Resident)
[facility],   [address] [place] [address] [address]   [place] [address]  Phone: [phone]  Fax: (   )    -  Follow Up Time: 1 week

## 2024-02-24 ENCOUNTER — EMERGENCY (EMERGENCY)
Facility: HOSPITAL | Age: 49
LOS: 1 days | Discharge: HOME | End: 2024-02-24
Attending: EMERGENCY MEDICINE
Payer: MEDICAID

## 2024-02-24 VITALS — HEART RATE: 65 BPM | TEMPERATURE: 97 F | OXYGEN SATURATION: 96 % | RESPIRATION RATE: 18 BRPM

## 2024-02-24 VITALS
DIASTOLIC BLOOD PRESSURE: 63 MMHG | RESPIRATION RATE: 20 BRPM | TEMPERATURE: 97 F | HEART RATE: 68 BPM | OXYGEN SATURATION: 97 % | SYSTOLIC BLOOD PRESSURE: 99 MMHG

## 2024-02-24 PROCEDURE — 99283 EMERGENCY DEPT VISIT LOW MDM: CPT

## 2024-02-24 PROCEDURE — 99285 EMERGENCY DEPT VISIT HI MDM: CPT

## 2024-02-24 NOTE — ED PROVIDER NOTE - OBJECTIVE STATEMENT
40-year-old male  with past med history of alcohol abuse presents with alcohol intoxication.  Patient was found intoxicated at [address],  admitting that he drank too much.  Patient denies any physical complaints at this time including no headache, chest pain, shortness of breath, abdominal pain, fever, chills, suicidal ideations.

## 2024-02-24 NOTE — ED PROVIDER NOTE - PATIENT PORTAL LINK FT
You can access the FollowMyHealth Patient Portal offered by [facility] by registering at the following website: [email or web address]. By joining [facility]’s FollowMyHealth portal, you will also be able to view your health information using other applications (apps) compatible with our system.

## 2024-02-24 NOTE — ED ADULT TRIAGE NOTE - CHIEF COMPLAINT QUOTE
Pt BIBA from [address], admits to drinking alcohol today, placed into yellow gown for pt safety, pt states "I am a piece of shit"

## 2024-02-24 NOTE — ED ADULT TRIAGE NOTE - GLASGOW COMA SCALE: BEST MOTOR RESPONSE, MLM
69 y/o M presents with LLE DVT. Patient had CT showing enlarged prostate and 12mm left renal mass. Patient denies any urianry symptoms. He states he voids without difficulty. He was started on flomax during this admission. He denies hematuria dysuria frequency or urgency of urination    Vital Signs Last 24 Hrs  T(C): 36.9 (15 Oct 2019 00:03), Max: 36.9 (15 Oct 2019 00:03)  T(F): 98.5 (15 Oct 2019 00:03), Max: 98.5 (15 Oct 2019 00:03)  HR: 76 (15 Oct 2019 00:03) (76 - 79)  BP: 171/91 (15 Oct 2019 00:03) (171/91 - 173/96)  BP(mean): --  RR: 18 (15 Oct 2019 00:03) (18 - 18)  SpO2: 98% (15 Oct 2019 00:03) (95% - 98%)    Gen: NAD  HEENT: NCAT  Abd: Soft  : Uncirc penis, b/l normal testicles                          8.4    4.15  )-----------( 342      ( 15 Oct 2019 08:14 )             25.7 (M6) obeys commands

## 2024-03-22 NOTE — ED PROVIDER NOTE - WET READ LAUNCH FT
Pt is here for a follow up on ultrasound results and labwork. She thought some of them look low   There are no Wet Read(s) to document. Dispense:  30 capsule     Refill:  2    spironolactone (ALDACTONE) 50 MG tablet     Sig: Take 1 tablet by mouth daily     Dispense:  30 tablet     Refill:  5       ORDERS:  No orders of the defined types were placed in this encounter.      Plan:  Reviewed lab results and ultrasound with patient.  Progesterone RX for continued hot flashes   Spironolactone for abnormal hair growth.   All risks and benefits of treatment discussed with patient.  Return in 3 months for follow up

## 2024-05-22 NOTE — ED ADULT NURSE NOTE - PRIMARY CARE PROVIDER
----- Message from Polina Christianson sent at 5/22/2024  8:12 AM CDT -----  Contact: Self  Patient is calling in regards to cataract surgery she is having done in June in [place], stated she is needing a clearance letter for this procedure. Can we please call pt back to advise at [phone]. Thank You  
Handled in other message  
MD

## 2024-08-12 NOTE — ED ADULT NURSE NOTE - NSFALLRISKASMT_ED_ALL_ED_DT
Please let him know that I saw the results of the recent blood work.  Cholesterol looks excellent.  Kidney function and liver function also look good.  No need to do anything differently if he feels okay. 27-Oct-2023 09:47

## 2024-08-20 NOTE — ED PROVIDER NOTE - TEMPLATE, MLM
Fair+ engagement with the therapist with pretend play to feed the dinosaurs.          LONG-TERM GOALS:   Long-term Goal Timeframe: 6 months   #1.Pt will be independent in pulling his pants up and down with verbal cues only in preparation for increased independence with toilet training in 75% of opportunities.          #2. Patient will tolerate sitting on the toilet for at least 3 minutes 4x per day.          Patient Education:   []  HEP/Education Completed:   []  No new Education completed  [x]  Reviewed Prior HEP      [x]  Patient/Caregiver verbalized and/or demonstrated understanding of education provided.  []  Patient/Caregiver unable to verbalize and/or demonstrate understanding of education provided.  Will continue education.  [x]  Barriers to learning: N/A    ASSESSMENT:  Activity/Treatment Tolerance:  [x]  Patient tolerated treatment well  []  Patient limited by fatigue  []  Patient limited by pain   []  Patient limited by medical complications  []  Other:    Assessment: Josue Albert is progressing towards goals.     Prognosis: good    PLAN:  Treatment Recommendations: Parent Education and Training, Fine motor play activities targeting grasp pattern, Play activities targeting social skills, Play activities targeting visual motor skills, Self-regulation training, Multi-sensory intervention, Self-feeding skills, Dressing skills, and Play activities targeting attention    []  Plan of care initiated.  Plan to see patient 1 times per week for 8 weeks to address the treatment planned outlined above.  [x]  Continue with current plan of care  []  Modify plan of care as follows:    []  Hold pending physician visit  []  Discharge    Time In 1030   Time Out 1115   Timed Code Minutes: 45 min   Total Treatment Time: 45 min       Electronically Signed by:   Federico Ramos MOT, OTR/L [id number]                       
Toxicology

## 2024-10-04 NOTE — ED PROVIDER NOTE - PRINCIPAL DIAGNOSIS
Wife was told to callback as to where to send the letter and they advised to please send it to   [employer], fax [phone]. Thank you    Knee pain

## 2024-11-04 NOTE — ED ADULT NURSE NOTE - NSSEPSISNEWALTERMENTAL_ED_A_ED
Lower Back Pain for one month.  Pain is constant waxes and wanes.  Patient denies injury.  Patient denies UTI symptoms No

## 2024-11-07 NOTE — ED ADULT TRIAGE NOTE - PATIENT ON (OXYGEN DELIVERY METHOD)
Treatment Goal Explanation (Does Not Render In The Note): Stable for the purposes of categorizing medical decision making is defined by the specific treatment goals for an individual patient. A patient that is not at their treatment goal is not stable, even if the condition has not changed and there is no short- term threat to life or function. Treatment Goal Met?: no room air

## 2024-12-04 NOTE — ED ADULT NURSE NOTE - NSICDXPASTMEDICALHX_GEN_ALL_CORE_FT
Patient seen for a left plantar foot wound. The wound was debrided by Dr. Evans. POC was adjusted to Iodosorb and a dry cover dressing. Dressing was explained and demonstrated to patient. Patient verbalized an understanding. All questions were answered. F/U in 2 weeks.    Problem: Skin Integrity Alteration  Goal: Skin remains intact with no new/deterioration of wound or pressure injury  Outcome: Monitoring/Evaluating progress  Goal: Participates in wound care activities  Outcome: Monitoring/Evaluating progress      PAST MEDICAL HISTORY:  No pertinent past medical history
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
## 2025-05-19 NOTE — ED PROVIDER NOTE - DATE/TIME 1
Problem: PAIN - ADULT  Goal: Verbalizes/displays adequate comfort level or baseline comfort level  Description: Interventions:  - Encourage patient to monitor pain and request assistance  - Assess pain using appropriate pain scale  - Administer analgesics as ordered based on type and severity of pain and evaluate response  - Implement non-pharmacological measures as appropriate and evaluate response  - Consider cultural and social influences on pain and pain management  - Notify physician/advanced practitioner if interventions unsuccessful or patient reports new pain  - Educate patient/family on pain management process including their role and importance of  reporting pain   - Provide non-pharmacologic/complimentary pain relief interventions  Outcome: Progressing     Problem: SAFETY ADULT  Goal: Patient will remain free of falls  Description: INTERVENTIONS:  - Educate patient/family on patient safety including physical limitations  - Instruct patient to call for assistance with activity   - Consider consulting OT/PT to assist with strengthening/mobility based on AM PAC & JH-HLM score  - Consult OT/PT to assist with strengthening/mobility   - Keep Call bell within reach  - Keep bed low and locked with side rails adjusted as appropriate  - Keep care items and personal belongings within reach  - Initiate and maintain comfort rounds  - Make Fall Risk Sign visible to staff  - Offer Toileting every 2 Hours, in advance of need  - Initiate/Maintain bed/chair alarm  - Obtain necessary fall risk management equipment: yellow bracelet and yellow socks   - Apply yellow socks and bracelet for high fall risk patients  - Consider moving patient to room near nurses station  Outcome: Progressing  Goal: Maintain or return to baseline ADL function  Description: INTERVENTIONS:  -  Assess patient's ability to carry out ADLs; assess patient's baseline for ADL function and identify physical deficits which impact ability to perform ADLs  (bathing, care of mouth/teeth, toileting, grooming, dressing, etc.)  - Assess/evaluate cause of self-care deficits   - Assess range of motion  - Assess patient's mobility; develop plan if impaired  - Assess patient's need for assistive devices and provide as appropriate  - Encourage maximum independence but intervene and supervise when necessary  - Involve family in performance of ADLs  - Assess for home care needs following discharge   - Consider OT consult to assist with ADL evaluation and planning for discharge  - Provide patient education as appropriate  - Monitor functional capacity and physical performance, use of AM PAC & JH-HLM   - Monitor gait, balance and fatigue with ambulation    Outcome: Progressing  Goal: Maintains/Returns to pre admission functional level  Description: INTERVENTIONS:  - Perform AM-PAC 6 Click Basic Mobility/ Daily Activity assessment daily.  - Set and communicate daily mobility goal to care team and patient/family/caregiver.   - Collaborate with rehabilitation services on mobility goals if consulted  - Perform Range of Motion 3 times a day.  - Reposition patient every 2 hours.  - Dangle patient 3 times a day  - Stand patient 3 times a day  - Ambulate patient 3 times a day  - Out of bed to chair 3 times a day   - Out of bed for meals 3 times a day  - Out of bed for toileting  - Record patient progress and toleration of activity level   Outcome: Progressing     Problem: DISCHARGE PLANNING  Goal: Discharge to home or other facility with appropriate resources  Description: INTERVENTIONS:  - Identify barriers to discharge w/patient and caregiver  - Arrange for needed discharge resources and transportation as appropriate  - Identify discharge learning needs (meds, wound care, etc.)  - Arrange for interpretive services to assist at discharge as needed  - Refer to Case Management Department for coordinating discharge planning if the patient needs post-hospital services based on  physician/advanced practitioner order or complex needs related to functional status, cognitive ability, or social support system  Outcome: Progressing     Problem: Knowledge Deficit  Goal: Patient/family/caregiver demonstrates understanding of disease process, treatment plan, medications, and discharge instructions  Description: Complete learning assessment and assess knowledge base.  Interventions:  - Provide teaching at level of understanding  - Provide teaching via preferred learning methods  Outcome: Progressing      24-Feb-2024 04:35

## 2025-06-03 NOTE — ED ADULT NURSE NOTE - DISCHARGE DATE/TIME
[facility] regarding overdue labs ordered 5/14/25 with expected date of 5/19/25. 1st attempt     
27-Apr-2023 06:02